# Patient Record
Sex: FEMALE | Race: WHITE | Employment: OTHER | URBAN - METROPOLITAN AREA
[De-identification: names, ages, dates, MRNs, and addresses within clinical notes are randomized per-mention and may not be internally consistent; named-entity substitution may affect disease eponyms.]

---

## 2021-05-27 PROBLEM — R09.81 NASAL CONGESTION: Status: ACTIVE | Noted: 2018-09-20

## 2021-05-27 PROBLEM — G31.84 MILD COGNITIVE IMPAIRMENT: Status: ACTIVE | Noted: 2021-03-29

## 2021-05-27 PROBLEM — J44.9 COPD WITH ASTHMA (HCC): Status: ACTIVE | Noted: 2021-05-06

## 2021-05-27 PROBLEM — H90.A22 SENSORINEURAL HEARING LOSS (SNHL) OF LEFT EAR WITH RESTRICTED HEARING OF RIGHT EAR: Status: ACTIVE | Noted: 2020-07-07

## 2021-05-27 PROBLEM — G89.29 CHRONIC LOW BACK PAIN: Status: ACTIVE | Noted: 2021-05-27

## 2021-05-27 PROBLEM — M54.50 CHRONIC LOW BACK PAIN: Status: ACTIVE | Noted: 2021-05-27

## 2021-05-27 PROBLEM — G47.33 OSA (OBSTRUCTIVE SLEEP APNEA): Status: ACTIVE | Noted: 2021-05-06

## 2021-05-27 PROBLEM — E78.5 DYSLIPIDEMIA: Status: ACTIVE | Noted: 2017-05-25

## 2021-05-27 PROBLEM — J30.9 ALLERGIC RHINITIS: Status: ACTIVE | Noted: 2021-05-27

## 2021-05-27 PROBLEM — F11.10 NARCOTIC ABUSE (HCC): Status: ACTIVE | Noted: 2021-05-27

## 2021-05-27 PROBLEM — J45.20 MILD INTERMITTENT ASTHMA WITHOUT COMPLICATION: Status: ACTIVE | Noted: 2021-05-27

## 2021-05-27 PROBLEM — K21.9 GASTROESOPHAGEAL REFLUX DISEASE WITHOUT ESOPHAGITIS: Status: ACTIVE | Noted: 2021-05-27

## 2021-05-27 PROBLEM — R42 DIZZINESS: Status: ACTIVE | Noted: 2020-07-07

## 2021-06-02 ENCOUNTER — HOSPITAL ENCOUNTER (OUTPATIENT)
Dept: ULTRASOUND IMAGING | Age: 76
Discharge: HOME | End: 2021-06-02
Attending: FAMILY MEDICINE
Payer: MEDICARE

## 2021-06-02 DIAGNOSIS — R22.1 NECK SWELLING: ICD-10-CM

## 2021-06-02 PROCEDURE — 76536 US EXAM OF HEAD AND NECK: CPT

## 2021-06-03 NOTE — PROGRESS NOTES
Please let patient know that ultrasound does show she needs a biopsy of a couple areas of her thyroid. Ask whether she wants to see ent or endocrinology. And if a different doctor is handling this. She has another pcp appointment with a Dr. Xavier Parent still.   She needs to choose one pcp to keep, whether here in [facility] or in [place].

## 2021-06-03 NOTE — PROGRESS NOTES
Spoke with patient regarding US results, expresses verbal understanding. She will stay with Dr Cecille Haskins, ENT for biopsies and she will choose you for her PCP.

## 2021-08-05 ENCOUNTER — HOSPITAL ENCOUNTER (OUTPATIENT)
Dept: GENERAL RADIOLOGY | Age: 76
Discharge: HOME | End: 2021-08-05
Attending: FAMILY MEDICINE
Payer: MEDICARE

## 2021-08-05 DIAGNOSIS — M54.50 CHRONIC MIDLINE LOW BACK PAIN, UNSPECIFIED WHETHER SCIATICA PRESENT: ICD-10-CM

## 2021-08-05 DIAGNOSIS — G89.29 CHRONIC MIDLINE LOW BACK PAIN, UNSPECIFIED WHETHER SCIATICA PRESENT: ICD-10-CM

## 2021-08-05 DIAGNOSIS — R20.2 NUMBNESS AND TINGLING OF LEFT LEG: ICD-10-CM

## 2021-08-05 DIAGNOSIS — R20.0 NUMBNESS AND TINGLING OF LEFT LEG: ICD-10-CM

## 2021-08-05 PROCEDURE — 72110 X-RAY EXAM L-2 SPINE 4/>VWS: CPT

## 2021-08-05 NOTE — PROGRESS NOTES
Please let patient know that xray doesn't have any acute findings, it has some arthritis. I will refer her to pain management.

## 2022-01-07 ENCOUNTER — HOSPITAL ENCOUNTER (EMERGENCY)
Age: 77
Discharge: HOME | End: 2022-01-07
Attending: EMERGENCY MEDICINE
Payer: MEDICARE

## 2022-01-07 ENCOUNTER — APPOINTMENT (OUTPATIENT)
Dept: GENERAL RADIOLOGY | Age: 77
End: 2022-01-07
Attending: EMERGENCY MEDICINE
Payer: MEDICARE

## 2022-01-07 VITALS
HEIGHT: 62 IN | DIASTOLIC BLOOD PRESSURE: 71 MMHG | HEART RATE: 98 BPM | BODY MASS INDEX: 25.76 KG/M2 | OXYGEN SATURATION: 96 % | SYSTOLIC BLOOD PRESSURE: 148 MMHG | WEIGHT: 140 LBS | RESPIRATION RATE: 16 BRPM | TEMPERATURE: 100.2 F

## 2022-01-07 DIAGNOSIS — I10 HYPERTENSION, UNSPECIFIED TYPE: ICD-10-CM

## 2022-01-07 DIAGNOSIS — U07.1 COVID-19: Primary | ICD-10-CM

## 2022-01-07 DIAGNOSIS — R11.2 NAUSEA AND VOMITING, INTRACTABILITY OF VOMITING NOT SPECIFIED, UNSPECIFIED VOMITING TYPE: ICD-10-CM

## 2022-01-07 LAB
ALBUMIN SERPL-MCNC: 3.6 G/DL (ref 3.2–4.6)
ALBUMIN/GLOB SERPL: 1.1 {RATIO} (ref 1.2–3.5)
ALP SERPL-CCNC: 106 U/L (ref 50–136)
ALT SERPL-CCNC: 30 U/L (ref 12–65)
ANION GAP SERPL CALC-SCNC: 2 MMOL/L (ref 7–16)
APPEARANCE UR: CLEAR
AST SERPL-CCNC: 20 U/L (ref 15–37)
BASOPHILS # BLD: 0 K/UL (ref 0–0.2)
BASOPHILS NFR BLD: 1 % (ref 0–2)
BILIRUB SERPL-MCNC: 0.6 MG/DL (ref 0.2–1.1)
BILIRUB UR QL: NEGATIVE
BUN SERPL-MCNC: 9 MG/DL (ref 8–23)
CALCIUM SERPL-MCNC: 8.8 MG/DL (ref 8.3–10.4)
CHLORIDE SERPL-SCNC: 102 MMOL/L (ref 98–107)
CO2 SERPL-SCNC: 30 MMOL/L (ref 21–32)
COLOR UR: YELLOW
COVID-19 RAPID TEST, COVR: DETECTED
CREAT SERPL-MCNC: 0.82 MG/DL (ref 0.6–1)
DIFFERENTIAL METHOD BLD: ABNORMAL
EOSINOPHIL # BLD: 0 K/UL (ref 0–0.8)
EOSINOPHIL NFR BLD: 0 % (ref 0.5–7.8)
ERYTHROCYTE [DISTWIDTH] IN BLOOD BY AUTOMATED COUNT: 12 % (ref 11.9–14.6)
GLOBULIN SER CALC-MCNC: 3.2 G/DL (ref 2.3–3.5)
GLUCOSE SERPL-MCNC: 98 MG/DL (ref 65–100)
GLUCOSE UR STRIP.AUTO-MCNC: NEGATIVE MG/DL
HCT VFR BLD AUTO: 39.4 % (ref 35.8–46.3)
HGB BLD-MCNC: 13.9 G/DL (ref 11.7–15.4)
HGB UR QL STRIP: NEGATIVE
IMM GRANULOCYTES # BLD AUTO: 0.1 K/UL (ref 0–0.5)
IMM GRANULOCYTES NFR BLD AUTO: 1 % (ref 0–5)
KETONES UR QL STRIP.AUTO: ABNORMAL MG/DL
LEUKOCYTE ESTERASE UR QL STRIP.AUTO: NEGATIVE
LYMPHOCYTES # BLD: 0.6 K/UL (ref 0.5–4.6)
LYMPHOCYTES NFR BLD: 10 % (ref 13–44)
MCH RBC QN AUTO: 32.8 PG (ref 26.1–32.9)
MCHC RBC AUTO-ENTMCNC: 35.3 G/DL (ref 31.4–35)
MCV RBC AUTO: 92.9 FL (ref 79.6–97.8)
MONOCYTES # BLD: 0.8 K/UL (ref 0.1–1.3)
MONOCYTES NFR BLD: 14 % (ref 4–12)
NEUTS SEG # BLD: 4.1 K/UL (ref 1.7–8.2)
NEUTS SEG NFR BLD: 74 % (ref 43–78)
NITRITE UR QL STRIP.AUTO: NEGATIVE
NRBC # BLD: 0 K/UL (ref 0–0.2)
PH UR STRIP: 7 [PH] (ref 5–9)
PLATELET # BLD AUTO: 145 K/UL (ref 150–450)
PMV BLD AUTO: 8 FL (ref 9.4–12.3)
POTASSIUM SERPL-SCNC: 3.8 MMOL/L (ref 3.5–5.1)
PROT SERPL-MCNC: 6.8 G/DL (ref 6.3–8.2)
PROT UR STRIP-MCNC: NEGATIVE MG/DL
RBC # BLD AUTO: 4.24 M/UL (ref 4.05–5.2)
SODIUM SERPL-SCNC: 134 MMOL/L (ref 136–145)
SOURCE, COVRS: ABNORMAL
SP GR UR REFRACTOMETRY: 1.01 (ref 1–1.02)
UROBILINOGEN UR QL STRIP.AUTO: 0.2 EU/DL (ref 0.2–1)
WBC # BLD AUTO: 5.6 K/UL (ref 4.3–11.1)

## 2022-01-07 PROCEDURE — 81003 URINALYSIS AUTO W/O SCOPE: CPT

## 2022-01-07 PROCEDURE — 71046 X-RAY EXAM CHEST 2 VIEWS: CPT

## 2022-01-07 PROCEDURE — 85025 COMPLETE CBC W/AUTO DIFF WBC: CPT

## 2022-01-07 PROCEDURE — 96374 THER/PROPH/DIAG INJ IV PUSH: CPT

## 2022-01-07 PROCEDURE — 74011250636 HC RX REV CODE- 250/636: Performed by: PHYSICIAN ASSISTANT

## 2022-01-07 PROCEDURE — 80053 COMPREHEN METABOLIC PANEL: CPT

## 2022-01-07 PROCEDURE — 87635 SARS-COV-2 COVID-19 AMP PRB: CPT

## 2022-01-07 PROCEDURE — 99284 EMERGENCY DEPT VISIT MOD MDM: CPT

## 2022-01-07 RX ORDER — METOPROLOL TARTRATE 25 MG/1
25 TABLET, FILM COATED ORAL 2 TIMES DAILY
Qty: 28 TABLET | Refills: 0 | Status: SHIPPED | OUTPATIENT
Start: 2022-01-07 | End: 2022-01-21

## 2022-01-07 RX ORDER — ACETAMINOPHEN 500 MG
1000 TABLET ORAL
Status: DISCONTINUED | OUTPATIENT
Start: 2022-01-07 | End: 2022-01-07 | Stop reason: HOSPADM

## 2022-01-07 RX ORDER — BENZONATATE 100 MG/1
100 CAPSULE ORAL
Qty: 21 CAPSULE | Refills: 0 | Status: SHIPPED | OUTPATIENT
Start: 2022-01-07 | End: 2022-01-14

## 2022-01-07 RX ORDER — ONDANSETRON 2 MG/ML
4 INJECTION INTRAMUSCULAR; INTRAVENOUS ONCE
Status: COMPLETED | OUTPATIENT
Start: 2022-01-07 | End: 2022-01-07

## 2022-01-07 RX ORDER — ONDANSETRON 4 MG/1
4 TABLET, ORALLY DISINTEGRATING ORAL
Qty: 12 TABLET | Refills: 0 | Status: SHIPPED | OUTPATIENT
Start: 2022-01-07 | End: 2022-01-10

## 2022-01-07 RX ADMIN — ONDANSETRON 4 MG: 2 INJECTION INTRAMUSCULAR; INTRAVENOUS at 12:29

## 2022-01-07 RX ADMIN — SODIUM CHLORIDE 1000 ML: 9 INJECTION, SOLUTION INTRAVENOUS at 12:28

## 2022-01-07 NOTE — ED NOTES
I have reviewed discharge instructions with the patient. The patient verbalized understanding. Patient left ED via Discharge Method: ambulatory to Home with self. Opportunity for questions and clarification provided. Patient given 1 scripts. To continue your aftercare when you leave the hospital, you may receive an automated call from our care team to check in on how you are doing. This is a free service and part of our promise to provide the best care and service to meet your aftercare needs.  If you have questions, or wish to unsubscribe from this service please call [phone]. Thank you for Choosing our [facility] Emergency Department.

## 2022-01-07 NOTE — ED TRIAGE NOTES
Patient with multiple complaints in triage states her blood pressure has been up and down for \" a while\", dizzy episiodes for \"a long time\", fever, headache, sinus congestion, vomiting. Patient advises that she talked to her physician yesterday and was told to come get examined for COVID. Patient also concerned that she has not been able to get a CPAP machine. Masked.

## 2022-01-07 NOTE — DISCHARGE INSTRUCTIONS
I would recommend quarantining for additional 5 days since you have a low-grade fever here. If you do not have a fever or congestion by that point then you do not have to quarantine anymore but can wear a mask for another 5 days.

## 2022-01-07 NOTE — ED PROVIDER NOTES
Presents to the emergency department stating that over the last 3 days she has had headache, nasal congestion and cough. Today she had 2 episodes of vomiting. Subjective fever and chills and myalgias. She has a history of asthma and COPD. She denies any chest pain or any worsening shortness of breath. She does have an albuterol inhaler at home.     Also complains of some urinary frequency over the past week but denies any dysuria           Past Medical History:   Diagnosis Date    Arthritis     Asthma     MDI as needed    Cancer ([address]     skin    Claustrophobia     Environmental allergies     GERD (gastroesophageal reflux disease)     rare    Goiter     Hypertension     Memory loss     Multinodular goiter 2016    Psychiatric disorder     bi-polar       Past Surgical History:   Procedure Laterality Date    HX BREAST BIOPSY  2013    BREAST NEEDLE LOCALIZED BIOPSY performed by Juan Michel MD at [address] HX BREAST LUMPECTOMY  2013    BREAST LUMPECTOMY/PARTIAL performed by Juan Michel MD at [address] HX CATARACT REMOVAL      right eye    HX CHOLECYSTECTOMY      HX GYN      ovarian cysts, L ovary    HX HEENT      tonsillectomy    HX HEENT      sinus    HX OTHER SURGICAL      skin cancer removed from face         Family History:   Problem Relation Age of Onset    Cancer Mother         Uterine       Social History     Socioeconomic History    Marital status: SINGLE     Spouse name: Not on file    Number of children: Not on file    Years of education: Not on file    Highest education level: Not on file   Occupational History    Not on file   Tobacco Use    Smoking status: Former Smoker     Quit date: 2011     Years since quittin.0    Smokeless tobacco: Never Used   Vaping Use    Vaping Use: Never used   Substance and Sexual Activity    Alcohol use: No    Drug use: No    Sexual activity: Not Currently   Other Topics Concern     Service Not Asked    Blood Transfusions Not Asked    Caffeine Concern Not Asked    Occupational Exposure Not Asked    Hobby Hazards Not Asked    Sleep Concern Not Asked    Stress Concern Not Asked    Weight Concern Not Asked    Special Diet Not Asked    Back Care Not Asked    Exercise Not Asked    Bike Helmet Not Asked    Seat Belt Yes    Self-Exams Yes   Social History Narrative    Not on file     Social Determinants of Health     Financial Resource Strain:     Difficulty of Paying Living Expenses: Not on file   Food Insecurity:     Worried About Running Out of Food in the Last Year: Not on file    Param of Food in the Last Year: Not on file   Transportation Needs:     Lack of Transportation (Medical): Not on file    Lack of Transportation (Non-Medical):  Not on file   Physical Activity:     Days of Exercise per Week: Not on file    Minutes of Exercise per Session: Not on file   Stress:     Feeling of Stress : Not on file   Social Connections:     Frequency of Communication with Friends and Family: Not on file    Frequency of Social Gatherings with Friends and Family: Not on file    Attends Hinduism Services: Not on file    Active Member of [address] [place] [address] or Organizations: Not on file    Attends Club or Organization Meetings: Not on file    Marital Status: Not on file   Intimate Partner Violence:     Fear of Current or Ex-Partner: Not on file    Emotionally Abused: Not on file    Physically Abused: Not on file    Sexually Abused: Not on file   Housing Stability:     Unable to Pay for Housing in the Last Year: Not on file    Number of [place] in the Last Year: Not on file    Unstable Housing in the Last Year: Not on file         ALLERGIES: Latex, Articaine, Clindamycin, Donepezil, Epinephrine, Erythromycin, Keflex [cephalexin], Penicillin g, Penicillins, Rivastigmine, Tramadol, Chlorzoxazone, Gabapentin, and Tetracyclines    Review of Systems   Constitutional: Positive for chills, fatigue and fever. HENT: Positive for congestion. Respiratory: Positive for cough. Negative for shortness of breath. Gastrointestinal: Positive for nausea and vomiting. Neurological: Positive for headaches. All other systems reviewed and are negative. Vitals:    01/07/22 1042   BP: 128/76   Pulse: 98   Resp: 16   Temp: 100.2 °F (37.9 °C)   SpO2: 96%   Weight: 63.5 kg (140 lb)   Height: 5' 2\" (1.575 m)            Physical Exam  Vitals and nursing note reviewed. Constitutional:       Appearance: Normal appearance. HENT:      Head: Normocephalic and atraumatic. Nose: Congestion present. Mouth/Throat:      Mouth: Mucous membranes are moist.   Eyes:      Extraocular Movements: Extraocular movements intact. Conjunctiva/sclera: Conjunctivae normal.      Pupils: Pupils are equal, round, and reactive to light. Cardiovascular:      Rate and Rhythm: Normal rate and regular rhythm. Pulses: Normal pulses. Heart sounds: Normal heart sounds. Pulmonary:      Effort: Pulmonary effort is normal. No respiratory distress. Breath sounds: Normal breath sounds. No wheezing. Abdominal:      General: Abdomen is flat. Palpations: Abdomen is soft. Tenderness: There is no abdominal tenderness. There is no guarding. Musculoskeletal:      Cervical back: Normal range of motion and neck supple. Neurological:      Mental Status: She is alert. MDM  Number of Diagnoses or Management Options  Diagnosis management comments: Differential diagnoses: COVID-19 infection, pneumonia, urinary tract infection    Patient's urine does not show any sign of UTI. We do not have monoclonal antibodies at this time to offer to the patient.   She also states that she is running low on her metoprolol for hypertension and request a refill which I will give her a prescription for       Amount and/or Complexity of Data Reviewed  Clinical lab tests: reviewed  Tests in the radiology section of CPT®: reviewed    Risk of Complications, Morbidity, and/or Mortality  Presenting problems: moderate  Diagnostic procedures: moderate  Management options: moderate    Patient Progress  Patient progress: improved         Procedures

## 2022-01-07 NOTE — ED NOTES
63-year-old female presents to the ED, stating that she is here on the advisement of her PCP and pulmonologist.  States that she had telephone calls \"practices yesterday afternoon with complaints of fever, headache, congestion. Patient states that they advised her to present to the ED, but she called a peer yesterday and after hearing what the wait time was, decided not to come to the department until today. Patient reports nausea with vomiting beginning overnight. Endorses productive cough with clear-colored expectorate, headache, fever, dizziness, nasal congestion and rhinorrhea and reports her concern for sinus infection x5-7 days. No numbness or tingling, cranial nerves grossly intact. Conversant length with no increased effort. Patient evaluated initially in triage. Rapid Medical Evaluation was conducted and necessary orders have been placed. I have performed a medical screening exam.  Care will now be transferred to the provider in the back of the emergency department.   Krystal Bennett NP 10:46 AM

## 2022-03-02 PROBLEM — S00.03XA CONTUSION OF SCALP: Status: ACTIVE | Noted: 2022-03-02

## 2022-03-09 ENCOUNTER — HOSPITAL ENCOUNTER (OUTPATIENT)
Dept: PHYSICAL THERAPY | Age: 77
Discharge: HOME | End: 2022-03-09
Attending: PHYSICIAN ASSISTANT
Payer: MEDICARE

## 2022-03-09 DIAGNOSIS — H81.12 BENIGN PAROXYSMAL POSITIONAL VERTIGO OF LEFT EAR: ICD-10-CM

## 2022-03-09 PROCEDURE — 97162 PT EVAL MOD COMPLEX 30 MIN: CPT

## 2022-03-09 NOTE — PROGRESS NOTES
Orvel Cogan  : 1945  [place]  Secondary:  [facility] [address] [facility], [place], [place] [address]  [phone]   [phone]         OUTPATIENT PHYSICAL THERAPY: Daily Treatment Note 3/9/2022  Visit Count:  1    ICD-10: Treatment Diagnosis: other abnormalities of gait and mobility R26.89; Dizziness and giddiness R42  Precautions/Allergies:   Latex, Articaine, Clindamycin, Donepezil, Epinephrine, Erythromycin, Keflex [cephalexin], Penicillin g, Penicillins, Rivastigmine, Tramadol, Chlorzoxazone, Gabapentin, and Tetracyclines   TREATMENT PLAN:  Effective Dates: 3/9/2022 TO 2022 (90 days). Frequency/Duration: 1-2 times a week for 60- 90 Day(s)    Pre-treatment Symptoms/Complaints:  Falls, dizziness  Pain: Initial: Pain Intensity 1: 0 0/10 Post Session:  0/10   Medications Last Reviewed:  3/9/2022  Updated Objective Findings:  See evaluation note from today  TREATMENT:     NEUROMUSCULAR RE-EDUCATION: (0 minutes):  Exercise/activities per grid below to improve balance, coordination, kinesthetic sense, posture and proprioception. Required minimal verbal cues to promote static and dynamic balance in standing.   Activity   Date   Date Date Date Date Date   Activity/Exercise   Sets/reps/equipment Sets/reps/  equipment Sets/reps/  equipment Sets/reps/  equipment Sets/reps/  equipment Sets/reps/  equipment   Walking with head turns             Walking with head up & down             Step ups             Step taps             Marching           Sidestepping           Crossovers           [place]           Walking  backwards             Tandem walking           Weaving in/out of cones             Picking up cones             Sports cord             [employer] ball           Figure 8s            Circles right/left           Walking with 360 degree turns           Spirals           Weight shifting:    Left & Right Weight shifting: Forward & Backward              Static Standing Balance             Standing with feet apart             Standing with feet together             Standing with feet semitandem           Standing with feet tandem           Single leg stance           X1/X2 Viewing exercises             [place] testing for BPPV (Benign Paroxysmal Positional Vertigo)             Young-Daroff exercises           Canalith Repositioning treatment/Epley Maneuver  for BPPV (Benign Paroxysmal Positional Vertigo)           Smart [employer] Training: See scanned report. [employer] Meeting Portal  Treatment/Session Summary:    · Response to Treatment:  Patient tolerated session well. Will give HEP next session. · Communication/Consultation:  None today  · Equipment provided today:  None today  · Recommendations/Intent for next treatment session: Next visit will focus on balance training, safety with mobility. Total Treatment Billable Duration: Evaluation only today.    PT Patient Time In/Time Out  Time In: 1105  Time Out: [address], PT   Visit # Therapist initials Date Arrived NS/ Cx < 24 hr >24 hr Cx Visit Comments   1 MM 3/9/22 X   Initial Assessment Only Today  Medicare                                                                                                                                                              Abbreviations: NS = No Show; CX = cancelled        Future Appointments   Date Time Provider Celeste Landry   3/11/2022  3:00 PM PVF NURSE ONLY ASHLEY PVF PVD   3/16/2022 10:15 AM Almeta Boron, PT [facility] S[facility]   3/16/2022 11:30 AM MD ASHLEY Osuna CMW CMW   3/23/2022 11:00 AM Almeta Boron, PT [facility]   3/31/2022  1:00 PM Almgilberto Boron, PT [facility] SFE   8/4/2022  3:00 PM MD ASHLEY Johansen PVF PVD

## 2022-03-09 NOTE — THERAPY EVALUATION
Rayne Fields  : 1945  [place]  Secondary:  [address] at [address]  [address], [address], [facility] [address]  [phone]   [phone]           [address] Assessment 3/9/2022   ICD-10: Treatment Diagnosis: other abnormalities of gait and mobility R26.89; Dizziness and giddiness R42  Precautions/Allergies:   Latex, Articaine, Clindamycin, Donepezil, Epinephrine, Erythromycin, Keflex [cephalexin], Penicillin g, Penicillins, Rivastigmine, Tramadol, Chlorzoxazone, Gabapentin, and Tetracyclines   TREATMENT PLAN:  Effective Dates: 3/9/2022 TO 2022 (90 days). Frequency/Duration: 1-2 times a week for 60- 90 Day(s) MEDICAL/REFERRING DIAGNOSIS:  Benign paroxysmal positional vertigo of left ear [H81.12]   DATE OF ONSET: 22  REFERRING PHYSICIAN: Dash Brunner*  MD Orders: referral to PT  Return MD Appointment:      INITIAL ASSESSMENT:  Ms. Leeanna Eid presents with history of recent fall, hitting the back of her head, and dizziness. Patient tested negatively for BPPV bilaterally today. Patient had no c/o of increased dizziness with balance testing and mobility today, but reports a constant dizziness. Patient is at risk for falls with a score of 40/56 on the Blanton Balance Scale and a score of 14/24 on the Dynamic Gait Index. Patient would benefit from balance training to improve safety and decrease fall risk. Patient would benefit from PT to address these problems to improve patient's independence and safety with mobility and daily activities. Thank you. PROBLEM LIST (Impacting functional limitations):  1. Decreased Strength  2. Decreased ADL/Functional Activities  3. Decreased Transfer Abilities  4. Decreased Ambulation Ability/Technique  5. Decreased Balance  6. Decreased Activity Tolerance  7.  Decreased [place] with Home Exercise Program INTERVENTIONS PLANNED: (Treatment may consist of any combination of the following)  1. Balance Exercise  2. Home Exercise Program (HEP)  3. Neuromuscular Re-education/Strengthening  4. Therapeutic Activites  5. Therapeutic Exercise/Strengthening     GOALS: (Goals have been discussed and agreed upon with patient.)  Short-Term Functional Goals: Time Frame: 2-4 weeks  1. Patient will demonstrate independence and compliance with home exercise program to improve balance and dizziness for daily activities. 2. Patient will increase her score on the Blanton Balance Scale to greater than or equal to 43/56 indicating improved safety and decreased fall risk for daily activities. 3.   Discharge Goals: Time Frame: 8-12 weeks  1. Patient will report decreased dizziness to less than or equal to 1-2/10 with daily activities to improve safety and quality of life. 2. Patient will increase her score on the dynamic gait index to greater than or equal to 18/24 indicating improved balance and safety for community ambulation. 3. Patient will increase her score on the Blanton Balance Scale to greater than or equal to 48/56 indicating improved safety and decreased fall risk for daily activities. 4. Patient will ambulate with least assistive device over level and unlevel surfaces without evidence of imbalance to improve safety for daily activities. OUTCOME MEASURE:   Tool Used: Blanton Balance Scale  Score:  Initial: 40/56 Most Recent: X/56 (Date: -- )   Interpretation of Score: Each section is scored on a 0-4 scale, 0 representing the patients inability to perform the task and 4 representing independence. The scores of each section are added together for a total score of 56. The higher the patients score, the more independent the patient is. Any score below 45 indicates increased risk for falls.       Tool Used: Dynamic Gait Index  Score:  Initial: 14/24 Most Recent: X/24 (Date: -- )   Interpretation of Score: Each section is scored on a 0-3 scale, 0 representing the patients inability to perform the task and 3 representing independence. The scores of each section are added together for a total score of 24. Any score below 19 indicates increased risk for falls. MEDICAL NECESSITY:   · Patient is expected to demonstrate progress in strength, balance and functional technique to improve safety during daily activities. REASON FOR SERVICES/OTHER COMMENTS:  · Patient continues to demonstrate capacity to improve dizziness, balance, gait which will increase independence and increase safety. Total Duration:  PT Patient Time In/Time Out  Time In: 1105  Time Out: 1145    Rehabilitation Potential For Stated Goals: Good  Regarding Miriam Singh's therapy, I certify that the treatment plan above will be carried out by a therapist or under their direction. Thank you for this referral,  Natalie Judge, PT     Referring Physician Signature: James Caba _______________________________ Date _____________      PAIN/SUBJECTIVE:   Initial: Pain Intensity 1: 0  Post Session:  0/10   HISTORY:   History of Injury/Illness (Reason for Referral):  Brother brought her to PT, and spoke to PT privately to discuss patient's primary reason for being here, dizziness and fall. No falls since, but near falls. Brother says patient is mentally ill and is not sure what patient would say about what happened. Patient has no c/o dizziness lately. Patient is a poor historian. Pt says R leg gives her problems with balance. Recently on CPAP.  2/26/22 fell and hit back of head. Dizzy since day before. Was prescribed antivert at [facility] in [facility]. Past Medical History/Comorbidities:   Ms. Eloina Chambers  has a past medical history of Multinodular goiter (5/9/2016).     She has no past medical history of Aneurysm ([address] Arrhythmia, Autoimmune disease ([address] CAD (coronary artery disease), Chronic kidney disease, Chronic pain, Coagulation defects, COPD, Diabetes ([address] Difficult intubation, Heart failure ([address] Liver disease, Malignant hyperthermia due to anesthesia, Morbid obesity ([facility] Nausea & vomiting, Other ill-defined conditions(799[address]89), Pseudocholinesterase deficiency, PUD (peptic ulcer disease), Seizures ([facility] [address] Stroke ([facility] [address] Thromboembolus ([facility] [address] Unspecified adverse effect of anesthesia, or Unspecified sleep apnea. Ms. Theresa Lee  has a past surgical history that includes hx cholecystectomy; hx gyn; hx heent; hx cataract removal; hx heent; hx other surgical; hx breast lumpectomy (6/14/2013); and hx breast biopsy (6/14/2013). Social History/Living Environment:     lives alone, first floor. Drives but doesn't have a car so brother takes her places. No AD  Prior Level of Function/Work/Activity:  independent  Dominant Side:         LEFT  Personal Factors:          Sex:  female        Age:  78 y.o. Ambulatory/Rehab Services H2 Model Falls Risk Assessment   Risk Factors:       (4)  Confusion/Disorientation/Impulsivity       (1)  Dizziness/Vertigo       (5)  History of Recent Falls [w/in 3 months] Ability to Rise from Chair:       (0)  Ability to rise in a single movement   Falls Prevention Plan:       Exercise/Equipment Adaptation (specify):  close supervision during mobility/balance activities   Total: (5 or greater = High Risk): 10   ©2010 [facility] [address] Patent #[id number]. Federal Law prohibits the replication, distribution or use without written permission from [facility] [employer]   Current Medications:       Current Outpatient Medications:     meclizine (ANTIVERT) 12.5 mg tablet, Take 1 Tablet by mouth three (3) times daily as needed for Dizziness for up to 10 days. , Disp: 30 Tablet, Rfl: 0    aspirin 81 mg chewable tablet, Take 81 mg by mouth daily. , Disp: , Rfl:     fluticasone propionate (FLONASE) 50 mcg/actuation nasal spray, SPRAY 2 SPRAYS INTO EACH NOSTRIL EVERY DAY, Disp: 3 Each, Rfl: 3    OLANZapine (ZyPREXA) 5 mg tablet, Take  by mouth nightly.  Take 5 mg, one tab in the morning and 10 mg, 2 tabs at night., Disp: , Rfl:     diphenhydrAMINE (BenadryL) 25 mg capsule, Take 25 mg by mouth every six (6) hours as needed. Take one to two capsules as needed for allergies. , Disp: , Rfl:     albuterol (Proventil HFA) 90 mcg/actuation inhaler, Take 1 Puff by inhalation every four (4) hours as needed for Wheezing. Take morning of surgery if needed and bring to hospital, Disp: 1 Each, Rfl: 5    lidocaine-menthol 4-1 % lqro, by Apply Externally route. Use as needed for pain, Disp: , Rfl:     ketoconazole (NIZORAL) 2 % shampoo, Use PRN, Disp: , Rfl:     meloxicam (MOBIC) 7.5 mg tablet, Take 1 Tablet by mouth two (2) times a day., Disp: 180 Tablet, Rfl: 3    budesonide-formoteroL (Symbicort) 160-4.5 mcg/actuation HFAA, Take 1 Puff by inhalation two (2) times a day., Disp: 3 Inhaler, Rfl: 3    magnesium gluconate 500 mg (27 mg  elemental) tablet, Take  by mouth two (2) times a day., Disp: , Rfl:     COQ10, LIPOSOMAL UBIQUINOL, PO, Take  by mouth., Disp: , Rfl:     ZINC ACETATE PO, Take  by mouth., Disp: , Rfl:     polyethylene glycol 3350 (MIRALAX PO), Take  by mouth., Disp: , Rfl:     vit C/ascorbate calcium,sodium (VIT C-ASCORBATE CA-ASCORB SOD PO), Take  by mouth., Disp: , Rfl:     metoprolol tartrate (LOPRESSOR) 25 mg tablet, Take 1 Tablet by mouth two (2) times a day., Disp: 180 Tablet, Rfl: 3    Adenosine Triphosphate 25 mg TbEC, Take 1 Tab by mouth., Disp: , Rfl:     vitamin e (E GEMS) 1,000 unit capsule, Take 1,000 Units by mouth daily. , Disp: , Rfl:     cholecalciferol (VITAMIN D3) 1,000 unit tablet, Take  by mouth daily. , Disp: , Rfl:     fexofenadine (ALLEGRA) 180 mg tablet, Take  by mouth daily. , Disp: , Rfl:     cyanocobalamin (VITAMIN B12) 500 mcg tablet, Take 500 mcg by mouth daily. , Disp: , Rfl:    Date Last Reviewed:  3/9/22   Number of Personal Factors/Comorbidities that affect the Plan of Care: 3+: HIGH COMPLEXITY   EXAMINATION: Observation/Orthostatic Postural Assessment:          Fwd head posture  Strength:         LE STRENGTH:  4/5 L 4-/5R hip flexion, 4/5 hip abduction, 4/5 hip adduction, 4/5 hip extension, 4/5L 4-/5R knee extension, 4/5 knee flexion, 4/5 ankle dorsiflexion, 4/5 ankle plantarflexion      Functional Mobility:         Gait/Ambulation:  Patient ambulates with no AD, demonstrating decreased step length and heel strike B, slow pace, mild path deviations. Transfers:  independent        Bed Mobility:  independent        Stairs:  With rail  Sensation:         intact  Postural Control & Balance:  · Blanton Balance Scale:  40/56.   (A score less than 45/56 indicates high risk of falls)     · Dynamic Gait Index:  14/24.   (A score less than or equal to19/24 is abnormal and predictive of falls)         Oculomotor Exam:  · Eye Range of Motion:  full range of motion  · Cervical Range of Motion:   diminished range of motion  · Spontaneous nystagmus:  NO  · Gaze holding nystagmus:  NO  · Skew Deviation:  none  · Smooth Pursuit:      [x]Smooth Eye Movements    []Delayed Eye Movements     []Within Normal Limits     []Other(comment): · Voluntary Saccades:      [x]Smooth Eye Movements    []Delayed Eye Movements     []Within Normal Limits     []Other(comment): · Optokinetic cloth: NT  Vestibular Ocular Reflex Testing:  · Dynamic Visual Acuity Test: NT  · Head Thrust Test: Negative to the right. Negative to the left. · VOR Cancellation: WNL  Position Tests:  · Hallpike-Sedalia testing presented as negative indicating that Benign Paroxysmal Positional Vertigo (BPPV) is absent bilaterally. Body Structures Involved:  1. Nerves  2. Eyes and Ears  3. Muscles Body Functions Affected:  1. Sensory/Pain  2. Neuromusculoskeletal  3. Movement Related Activities and Participation Affected:  1. General Tasks and Demands  2. Mobility  3. Domestic Life  4.  Community, Social and [place]   Number of elements (examined above) that affect the Plan of Care: 3: MODERATE COMPLEXITY   CLINICAL PRESENTATION:   Presentation: Evolving clinical presentation with changing clinical characteristics: MODERATE COMPLEXITY   CLINICAL DECISION MAKING:   Use of outcome tool(s) and clinical judgement create a POC that gives a: Questionable prediction of patient's progress: MODERATE COMPLEXITY

## 2022-03-16 ENCOUNTER — HOSPITAL ENCOUNTER (OUTPATIENT)
Dept: PHYSICAL THERAPY | Age: 77
Discharge: HOME | End: 2022-03-16
Attending: PHYSICIAN ASSISTANT
Payer: MEDICARE

## 2022-03-16 PROCEDURE — 97110 THERAPEUTIC EXERCISES: CPT

## 2022-03-16 PROCEDURE — 97112 NEUROMUSCULAR REEDUCATION: CPT

## 2022-03-16 NOTE — PROGRESS NOTES
Tomy Potts  : 1945  [place]  Secondary:  [address] at [place] [id number] [facility], [address] [place] [address],[address], [facility] [address]  [phone]   [phone]         OUTPATIENT PHYSICAL THERAPY: Daily Treatment Note 3/16/2022  Visit Count:  2    ICD-10: Treatment Diagnosis: other abnormalities of gait and mobility R26.89; Dizziness and giddiness R42  Precautions/Allergies:   Latex, Articaine, Clindamycin, Donepezil, Epinephrine, Erythromycin, Keflex [cephalexin], Penicillin g, Penicillins, Rivastigmine, Tramadol, Chlorzoxazone, Gabapentin, and Tetracyclines   TREATMENT PLAN:  Effective Dates: 3/9/2022 TO 2022 (90 days). Frequency/Duration: 1-2 times a week for 60- 90 Day(s)    Pre-treatment Symptoms/Complaints:  Falls, dizziness; \"doing okay. No falls. \"  Pain: Initial:   0/10 Post Session:  0/10   Medications Last Reviewed:  3/16/2022  Updated Objective Findings:  None Today  TREATMENT:     NEUROMUSCULAR RE-EDUCATION: (35 minutes):  Exercise/activities per grid below to improve balance, coordination, kinesthetic sense, posture and proprioception. Required minimal verbal cues to promote static and dynamic balance in standing.   Activity   Date  3/16/22 Date Date Date Date Date   Activity/Exercise   Sets/reps/equipment Sets/reps/  equipment Sets/reps/  equipment Sets/reps/  equipment Sets/reps/  equipment Sets/reps/  equipment   Walking with head turns             Walking with head up & down             Step overs     Over 1/2 foam roll x 10 reps each leg fwd and laterally        Step taps     6 inch step x 20 reps fwd and cross        Marching   4 laps in real        Sidestepping   4 laps in real        Crossovers           [facility]  backwards             Tandem walking           Weaving in/out of cones             Picking up cones             Sports cord             [employer] ball           Figure 8s            Circles right/left           Walking with 360 degree turns           Spirals           Weight shifting:    Left & Right             Weight shifting: Forward & Backward              Static Standing Balance             Standing with feet apart     Blue foams eyes open and closed        Standing with feet together             Standing with feet semitandem   Blue foams eyes open and closed        Standing with feet tandem           Single leg stance           X1/X2 Viewing exercises             Hallpike-Amna testing for BPPV (Benign Paroxysmal Positional Vertigo)             Young-Daroff exercises           Canalith Repositioning treatment/Epley Maneuver  for BPPV (Benign Paroxysmal Positional Vertigo)           Smart Equitest Training: See scanned report. Therapeutic Exercise: ( 10 minutes):  Exercises per grid below to improve mobility, strength and balance. Required minimal verbal cues to promote proper body alignment, promote proper body posture and promote proper body mechanics. Progressed resistance, repetitions and complexity of movement as indicated. Date:  3/16/22 Date:   Date:     Activity/Exercise Parameters Parameters Parameters   Step ups 6 inch step x 10 reps each leg with rail     Sit to stand X 10 reps from chair with no UE assist                                         [employer] Portal  Treatment/Session Summary:    · Response to Treatment:  Patient tolerated session well. Patient needed seated rest breaks during session due to fatigue. Patient is cautious with balance exercises but does well with encouragement. Continue to progress as tolerated. · Communication/Consultation:  None today  · Equipment provided today:  None today  · Recommendations/Intent for next treatment session: Next visit will focus on balance training, safety with mobility.     Total Treatment Billable Duration: 45 minutes  PT Patient Time In/Time Out  Time In: 1015  Time Out: [address] ROCAEL Phillips   Visit # Therapist initials Date Arrived NS/ Cx < 24 hr >24 hr Cx Visit Comments   1 MM 3/9/22 X   Initial Assessment Only Today  Medicare   2 MM 3/16/22 X                                                                                                                                                        Abbreviations: NS = No Show; CX = cancelled        Future Appointments   Date Time Provider Celeste Gris   3/23/2022 11:00 AM Giuliana Marr PT [facility]   3/31/2022  1:00 PM ROCAEL Lindquist E   8/4/2022  3:00 PM Orquidea Rogers MD [facility] PVD

## 2022-03-18 PROBLEM — E78.5 DYSLIPIDEMIA: Status: ACTIVE | Noted: 2017-05-25

## 2022-03-18 PROBLEM — R09.81 NASAL CONGESTION: Status: ACTIVE | Noted: 2018-09-20

## 2022-03-18 PROBLEM — K21.9 GASTROESOPHAGEAL REFLUX DISEASE WITHOUT ESOPHAGITIS: Status: ACTIVE | Noted: 2021-05-27

## 2022-03-18 PROBLEM — H90.A22 SENSORINEURAL HEARING LOSS (SNHL) OF LEFT EAR WITH RESTRICTED HEARING OF RIGHT EAR: Status: ACTIVE | Noted: 2020-07-07

## 2022-03-18 PROBLEM — G31.84 MILD COGNITIVE IMPAIRMENT: Status: ACTIVE | Noted: 2021-03-29

## 2022-03-19 PROBLEM — F11.10 NARCOTIC ABUSE (HCC): Status: ACTIVE | Noted: 2021-05-27

## 2022-03-19 PROBLEM — J44.89 COPD WITH ASTHMA: Status: ACTIVE | Noted: 2021-05-06

## 2022-03-19 PROBLEM — J30.9 ALLERGIC RHINITIS: Status: ACTIVE | Noted: 2021-05-27

## 2022-03-19 PROBLEM — R42 DIZZINESS: Status: ACTIVE | Noted: 2020-07-07

## 2022-03-19 PROBLEM — J44.9 COPD WITH ASTHMA (HCC): Status: ACTIVE | Noted: 2021-05-06

## 2022-03-19 PROBLEM — J45.20 MILD INTERMITTENT ASTHMA WITHOUT COMPLICATION: Status: ACTIVE | Noted: 2021-05-27

## 2022-03-20 PROBLEM — G89.29 CHRONIC LOW BACK PAIN: Status: ACTIVE | Noted: 2021-05-27

## 2022-03-20 PROBLEM — G47.33 OSA (OBSTRUCTIVE SLEEP APNEA): Status: ACTIVE | Noted: 2021-05-06

## 2022-03-20 PROBLEM — M54.50 CHRONIC LOW BACK PAIN: Status: ACTIVE | Noted: 2021-05-27

## 2022-03-23 ENCOUNTER — HOSPITAL ENCOUNTER (OUTPATIENT)
Dept: PHYSICAL THERAPY | Age: 77
Discharge: HOME | End: 2022-03-23
Attending: PHYSICIAN ASSISTANT
Payer: MEDICARE

## 2022-03-23 PROCEDURE — 97110 THERAPEUTIC EXERCISES: CPT

## 2022-03-23 PROCEDURE — 97112 NEUROMUSCULAR REEDUCATION: CPT

## 2022-03-23 NOTE — PROGRESS NOTES
Rojas Hallman  : 1945  [place]  Secondary:  [address]  at [facility]  [address], [address] [facility] [address], [place] [address]  [phone]   [phone]         OUTPATIENT PHYSICAL THERAPY: Daily Treatment Note 3/23/2022  Visit Count:  3    ICD-10: Treatment Diagnosis: other abnormalities of gait and mobility R26.89; Dizziness and giddiness R42  Precautions/Allergies:   Latex, Articaine, Clindamycin, Donepezil, Epinephrine, Erythromycin, Keflex [cephalexin], Penicillin g, Penicillins, Rivastigmine, Tramadol, Chlorzoxazone, Gabapentin, and Tetracyclines   TREATMENT PLAN:  Effective Dates: 3/9/2022 TO 2022 (90 days). Frequency/Duration: 1-2 times a week for 60- 90 Day(s)    Pre-treatment Symptoms/Complaints:  Falls, dizziness; \"I didn't sleep well last night. The cpap kept me awake all night trying to fix it. I'm dizzy again. R foot lags. \"  Pain: Initial:   0/10 Post Session:  0/10   Medications Last Reviewed:  3/23/2022  Updated Objective Findings:  None Today  TREATMENT:     NEUROMUSCULAR RE-EDUCATION: (30 minutes):  Exercise/activities per grid below to improve balance, coordination, kinesthetic sense, posture and proprioception. Required minimal verbal cues to promote static and dynamic balance in standing.   Activity   Date  3/16/22 Date  3/23/22 Date Date Date Date   Activity/Exercise   Sets/reps/equipment Sets/reps/  equipment Sets/reps/  equipment Sets/reps/  equipment Sets/reps/  equipment Sets/reps/  equipment   Walking with head turns             Walking with head up & down             Step overs     Over 1/2 foam roll x 10 reps each leg fwd and laterally Over 1/2 foam roll x 10 reps each leg fwd and laterally       Step taps     6 inch step x 20 reps fwd and cross 6 inch step x 20 reps fwd and cross       Marching   4 laps in real Not today; 4 laps in real       Sidestepping   4 laps in real Not today: 4 laps in real       Crossovers [place]           Walking  backwards             Tandem walking           Weaving in/out of cones             Picking up cones             Sports cord             [employer]           Kick ball           Figure 8s            Circles right/left           Walking with 360 degree turns           Spirals           Weight shifting:    Left & Right             Weight shifting: Forward & Backward              Static Standing Balance             Standing with feet apart     Blue foams eyes open and closed Blue foams eyes open and closed       Standing with feet together             Standing with feet semitandem   Blue foams eyes open and closed Blue foams eyes open and closed       Standing with feet tandem           Single leg stance           X1/X2 Viewing exercises             Hallpike-Amna testing for BPPV (Benign Paroxysmal Positional Vertigo)             Young-Daroff exercises           Canalith Repositioning treatment/Epley Maneuver  for BPPV (Benign Paroxysmal Positional Vertigo)           Smart Equitest Training: See scanned report. Therapeutic Exercise: ( 15 minutes):  Exercises per grid below to improve mobility, strength and balance. Required minimal verbal cues to promote proper body alignment, promote proper body posture and promote proper body mechanics. Progressed resistance, repetitions and complexity of movement as indicated. Date:  3/16/22 Date:  3/23/22 Date:     Activity/Exercise Parameters Parameters Parameters   Step ups 6 inch step x 10 reps each leg with rail 6 inch step x 10 reps each leg with rail    Sit to stand X 10 reps from chair with no UE assist X 10 reps from chair with no UE assist    nustep  Level 1 x 6 minutes                                  [employer] Portal  Treatment/Session Summary:    · Response to Treatment:  Patient tolerated session well. She needs frequent rest breaks due to fatigue, but recovers quickly.  Patient is hesitant to let go of bar during balance exercises, but will for short periods. Patient had no complaints during session. · Equipment provided today:  None today  · Recommendations/Intent for next treatment session: Next visit will focus on balance training, safety with mobility.     Total Treatment Billable Duration: 45 minutes  PT Patient Time In/Time Out  Time In: 1100  Time Out: [facility], PT   Visit # Therapist initials Date Arrived NS/ Cx < 24 hr >24 hr Cx Visit Comments   1 MM 3/9/22 X   Initial Assessment Only Today  Medicare   2 MM 3/16/22 X      3 MM 3/23/22 X                                                                                                                                               Abbreviations: NS = No Show; CX = cancelled        Future Appointments   Date Time Provider Celeste Landry   3/31/2022  1:00 PM [facility]   8/4/2022  3:00 PM Kaden Walker MD [facility]

## 2022-03-31 ENCOUNTER — HOSPITAL ENCOUNTER (OUTPATIENT)
Dept: PHYSICAL THERAPY | Age: 77
Discharge: HOME | End: 2022-03-31
Attending: PHYSICIAN ASSISTANT
Payer: MEDICARE

## 2022-03-31 PROCEDURE — 97110 THERAPEUTIC EXERCISES: CPT

## 2022-03-31 PROCEDURE — 97112 NEUROMUSCULAR REEDUCATION: CPT

## 2022-03-31 NOTE — PROGRESS NOTES
Sourav Gilman  : 1945  [place]  Secondary:  [address] at [place] [id number] [facility], [address] [place] [address],[address], [facility] [address].  [phone]   [phone]         OUTPATIENT PHYSICAL THERAPY: Daily Treatment Note 3/31/2022  Visit Count:  4    ICD-10: Treatment Diagnosis: other abnormalities of gait and mobility R26.89; Dizziness and giddiness R42  Precautions/Allergies:   Latex, Articaine, Clindamycin, Donepezil, Epinephrine, Erythromycin, Keflex [cephalexin], Penicillin g, Penicillins, Rivastigmine, Tramadol, Chlorzoxazone, Gabapentin, and Tetracyclines   TREATMENT PLAN:  Effective Dates: 3/9/2022 TO 2022 (90 days). Frequency/Duration: 1-2 times a week for 60- 90 Day(s)    Pre-treatment Symptoms/Complaints:  Falls, dizziness; \"My R hip/buttock has been hurting. I've been limping. No falls. I didn't take my meloxicam.\"  Pain: Initial:   4/10 Post Session:  4/10   Medications Last Reviewed:  3/31/2022  Updated Objective Findings:  None Today  TREATMENT:     NEUROMUSCULAR RE-EDUCATION: (30 minutes):  Exercise/activities per grid below to improve balance, coordination, kinesthetic sense, posture and proprioception. Required minimal verbal cues to promote static and dynamic balance in standing.   Activity   Date  3/16/22 Date  3/23/22 Date  3/31/22 Date Date Date   Activity/Exercise   Sets/reps/equipment Sets/reps/  equipment Sets/reps/  equipment Sets/reps/  equipment Sets/reps/  equipment Sets/reps/  equipment   Walking with head turns             Walking with head up & down             Step overs     Over 1/2 foam roll x 10 reps each leg fwd and laterally Over 1/2 foam roll x 10 reps each leg fwd and laterally Over 1/2 foam roll x 10 reps each leg fwd and laterally      Step taps     6 inch step x 20 reps fwd and cross 6 inch step x 20 reps fwd and cross 6 inch step x 20 reps fwd and cross      Marching   4 laps in real Not today; 4 laps in real 4 laps in real      Sidestepping   4 laps in real Not today: 4 laps in real 4 laps in real      Crossovers           [facility]  backwards             Tandem walking           Weaving in/out of cones             Picking up cones             Sports cord             [employer]           Kick ball           Figure 8s            Circles right/left           Walking with 360 degree turns           Spirals           Weight shifting:    Left & Right             Weight shifting: Forward & Backward              Static Standing Balance             Standing with feet apart     Blue foams eyes open and closed Blue foams eyes open and closed Blue foams eyes open and closed      Standing with feet together             Standing with feet semitandem   Blue foams eyes open and closed Blue foams eyes open and closed Blue foams eyes open and closed      Standing with feet tandem           Single leg stance           X1/X2 Viewing exercises             Hallpike-Amna testing for BPPV (Benign Paroxysmal Positional Vertigo)             Young-Daroff exercises           Canalith Repositioning treatment/Epley Maneuver  for BPPV (Benign Paroxysmal Positional Vertigo)           Smart Equitest Training: See scanned report. Therapeutic Exercise: ( 15 minutes):  Exercises per grid below to improve mobility, strength and balance. Required minimal verbal cues to promote proper body alignment, promote proper body posture and promote proper body mechanics. Progressed resistance, repetitions and complexity of movement as indicated.    Date:  3/16/22 Date:  3/23/22 Date:  3/31/22   Activity/Exercise Parameters Parameters Parameters   Step ups 6 inch step x 10 reps each leg with rail 6 inch step x 10 reps each leg with rail 6 inch step x 10 reps each leg with rail   Sit to stand X 10 reps from chair with no UE assist X 10 reps from chair with no UE assist X 10 reps from chair with no UE assist   nustep  Level 1 x 6 minutes Level 1 x 8 minutes                                 [employer] Portal  Treatment/Session Summary:    · Response to Treatment:  Patient tolerated session well. Patient did well with exercises and did not c/o R hip/buttock pain during exercises/mobility. Continue to progress as tolerated. · Equipment provided today:  None today  · Recommendations/Intent for next treatment session: Next visit will focus on balance training, safety with mobility.     Total Treatment Billable Duration: 45 minutes  PT Patient Time In/Time Out  Time In: 1300  Time Out: [address] Brando Carney PT   Visit # Therapist initials Date Arrived NS/ Cx < 24 hr >24 hr Cx Visit Comments   1 MM 3/9/22 X   Initial Assessment Only Today  Medicare   2 MM 3/16/22 X      3 MM 3/23/22 X      4 MM 3/31/22 X                                                                                                                                      Abbreviations: NS = No Show; CX = cancelled        Future Appointments   Date Time Provider Celeste Landry   4/13/2022  3:30 PM MD ASHLEY SchwabF CORDELLD   8/4/2022  3:00 PM MD ASHLEY Schwab PVF PVD

## 2022-04-07 ENCOUNTER — HOSPITAL ENCOUNTER (OUTPATIENT)
Dept: PHYSICAL THERAPY | Age: 77
Discharge: HOME | End: 2022-04-07
Attending: PHYSICIAN ASSISTANT
Payer: MEDICARE

## 2022-04-07 NOTE — PROGRESS NOTES
David Maki  : 1945  [place]  Secondary:  [address]  at [place] [id number] [facility], [address] [place] [address],[address], [facility] [address].  [phone]   [phone]          OUTPATIENT DAILY NOTE    NAME/AGE/GENDER: David Maki is a 78 y.o. female. DATE: 2022    Patient cancelled (less than 24 hours ago) her appointment for today due to no transportation. Will plan to follow up on next scheduled visit.     Grisel King PT    Future Appointments   Date Time Provider Celeste Landry   2022  3:30 PM MD ASHLEY Horta PVF PVD   2022  1:00 PM José Miguel Loera PT [facility] SFE   2022 10:15 AM ROCAEL Mart SFE   2022  1:00 PM ROCAEL MartEORPT SFE   2022  3:00 PM MD ASHLEY Horta PVF PVD

## 2022-04-14 ENCOUNTER — HOSPITAL ENCOUNTER (OUTPATIENT)
Dept: PHYSICAL THERAPY | Age: 77
Discharge: HOME | End: 2022-04-14
Attending: PHYSICIAN ASSISTANT
Payer: MEDICARE

## 2022-04-14 PROCEDURE — 97112 NEUROMUSCULAR REEDUCATION: CPT

## 2022-04-14 PROCEDURE — 97110 THERAPEUTIC EXERCISES: CPT

## 2022-04-14 NOTE — PROGRESS NOTES
Dagoberto Bell  : 1945  [place]  Secondary:  [address] at [place] [id number] [facility], [address] [place] [address],[address], [facility] [address]  [phone]   [phone]         OUTPATIENT PHYSICAL THERAPY: Daily Treatment Note 2022  Visit Count:  5    ICD-10: Treatment Diagnosis: other abnormalities of gait and mobility R26.89; Dizziness and giddiness R42  Precautions/Allergies:   Latex, Articaine, Clindamycin, Donepezil, Epinephrine, Erythromycin, Keflex [cephalexin], Penicillin g, Penicillins, Rivastigmine, Tramadol, Chlorzoxazone, Gabapentin, and Tetracyclines   TREATMENT PLAN:  Effective Dates: 3/9/2022 TO 2022 (90 days). Frequency/Duration: 1-2 times a week for 60- 90 Day(s)    Pre-treatment Symptoms/Complaints:  Falls, dizziness; \"I didn't sleep at all last night. \"  Pain: Initial:   4/10 Post Session:  4/10   Medications Last Reviewed:  2022  Updated Objective Findings:  None Today  TREATMENT:     NEUROMUSCULAR RE-EDUCATION: (30 minutes):  Exercise/activities per grid below to improve balance, coordination, kinesthetic sense, posture and proprioception. Required minimal verbal cues to promote static and dynamic balance in standing.   Activity   Date  3/16/22 Date  3/23/22 Date  3/31/22 Date  22 Date Date   Activity/Exercise   Sets/reps/equipment Sets/reps/  equipment Sets/reps/  equipment Sets/reps/  equipment Sets/reps/  equipment Sets/reps/  equipment   Walking with head turns             Walking with head up & down             Step overs     Over 1/2 foam roll x 10 reps each leg fwd and laterally Over 1/2 foam roll x 10 reps each leg fwd and laterally Over 1/2 foam roll x 10 reps each leg fwd and laterally Over 1/2 foam roll x 10 reps each leg fwd and laterally     Step taps     6 inch step x 20 reps fwd and cross 6 inch step x 20 reps fwd and cross 6 inch step x 20 reps fwd and cross 6 inch step x 20 reps fwd and cross     Marching   4 laps in real Not today; 4 laps in real 4 laps in real 4 laps in real     Sidestepping   4 laps in real Not today: 4 laps in real 4 laps in real 4 laps in real     Crossovers           [employer]  backwards             Tandem walking           Weaving in/out of cones             Picking up cones             Sports cord             [employer]           Kick ball           Figure 8s            Circles right/left           Walking with 360 degree turns           Spirals           Weight shifting:    Left & Right             Weight shifting: Forward & Backward              Static Standing Balance             Standing with feet apart     Blue foams eyes open and closed Blue foams eyes open and closed Blue foams eyes open and closed Blue foams eyes open and closed     Standing with feet together             Standing with feet semitandem   Blue foams eyes open and closed Blue foams eyes open and closed Blue foams eyes open and closed Blue foams eyes open and closed     Standing with feet tandem           Single leg stance           X1/X2 Viewing exercises             Hallpike-Amna testing for BPPV (Benign Paroxysmal Positional Vertigo)             Young-Daroff exercises           Canalith Repositioning treatment/Epley Maneuver  for BPPV (Benign Paroxysmal Positional Vertigo)           Smart Equitest Training: See scanned report. Therapeutic Exercise: ( 15 minutes):  Exercises per grid below to improve mobility, strength and balance. Required minimal verbal cues to promote proper body alignment, promote proper body posture and promote proper body mechanics. Progressed resistance, repetitions and complexity of movement as indicated.      Date:  3/16/22 Date:  3/23/22 Date:  3/31/22 Date  4/14/22   Activity/Exercise Parameters Parameters Parameters    Step ups 6 inch step x 10 reps each leg with rail 6 inch step x 10 reps each leg with rail 6 inch step x 10 reps each leg with rail 6 inch step x 10 reps each leg with rail   Sit to stand X 10 reps from chair with no UE assist X 10 reps from chair with no UE assist X 10 reps from chair with no UE assist X 10 reps from chair with no UE assist   nustep  Level 1 x 6 minutes Level 1 x 8 minutes Level 2 x 8 minutes                                     [employer] Portal  Treatment/Session Summary:    · Response to Treatment:  Patient tolerated session well. She was able to increase resistance on nustep today. She was able to use less UE assist for standing balance activities. Continue to progress as tolerated. · Equipment provided today:  None today  · Recommendations/Intent for next treatment session: Next visit will focus on balance training, safety with mobility.     Total Treatment Billable Duration: 45 minutes  PT Patient Time In/Time Out  Time In: 6112  Time Out: [address] Luis Inches, PT     Visit # Therapist initials Date Arrived NS/ Cx < 24 hr >24 hr Cx Visit Comments   1 MM 3/9/22 X   Initial Assessment Only Today  Medicare   2 MM 3/16/22 X      3 MM 3/23/22 X      4 MM 3/31/22 X      X MM 4/7/22  cx     5 MM 4/14/22 X                                                                                                                    Abbreviations: NS = No Show; CX = cancelled        Future Appointments   Date Time Provider Celeste Landry   4/20/2022 10:15 AM ROCAEL Cunha   4/27/2022  1:00 PM ROCAEL CunhaEORPORLIN SFE   8/4/2022  3:00 PM Trace Lu MD SSA PVF PVD

## 2022-04-20 ENCOUNTER — HOSPITAL ENCOUNTER (OUTPATIENT)
Dept: PHYSICAL THERAPY | Age: 77
Discharge: HOME | End: 2022-04-20
Attending: PHYSICIAN ASSISTANT
Payer: MEDICARE

## 2022-04-20 PROCEDURE — 97112 NEUROMUSCULAR REEDUCATION: CPT

## 2022-04-20 PROCEDURE — 97110 THERAPEUTIC EXERCISES: CPT

## 2022-04-20 NOTE — PROGRESS NOTES
James Welch  : 1945  [place]  Secondary:  [address] at [place] [id number] [facility], [address] [place] [address],[address], [place] [address].  [phone]   [phone]         OUTPATIENT PHYSICAL THERAPY: Daily Treatment Note 2022  Visit Count:  6    ICD-10: Treatment Diagnosis: other abnormalities of gait and mobility R26.89; Dizziness and giddiness R42  Precautions/Allergies:   Latex, Articaine, Clindamycin, Donepezil, Epinephrine, Erythromycin, Keflex [cephalexin], Penicillin g, Penicillins, Rivastigmine, Tramadol, Chlorzoxazone, Gabapentin, and Tetracyclines   TREATMENT PLAN:  Effective Dates: 3/9/2022 TO 2022 (90 days). Frequency/Duration: 1-2 times a week for 60- 90 Day(s)    Pre-treatment Symptoms/Complaints:  Falls, dizziness; \"pretty much no dizziness. \"  Pain: Initial:   4/10 R hip Post Session:   4/10 R hip   Medications Last Reviewed:  2022  Updated Objective Findings:  None Today  TREATMENT:     NEUROMUSCULAR RE-EDUCATION: (30 minutes):  Exercise/activities per grid below to improve balance, coordination, kinesthetic sense, posture and proprioception. Required minimal verbal cues to promote static and dynamic balance in standing.   Activity   Date  3/16/22 Date  3/23/22 Date  3/31/22 Date  22 Date  22 Date   Activity/Exercise   Sets/reps/equipment Sets/reps/  equipment Sets/reps/  equipment Sets/reps/  equipment Sets/reps/  equipment Sets/reps/  equipment   Walking with head turns             Walking with head up & down             Step overs     Over 1/2 foam roll x 10 reps each leg fwd and laterally Over 1/2 foam roll x 10 reps each leg fwd and laterally Over 1/2 foam roll x 10 reps each leg fwd and laterally Over 1/2 foam roll x 10 reps each leg fwd and laterally Over 1/2 foam roll x 10 reps each leg fwd and laterally    Step taps     6 inch step x 20 reps fwd and cross 6 inch step x 20 reps fwd and cross 6 inch step x 20 reps fwd and cross 6 inch step x 20 reps fwd and cross 6 inch step x 20 reps fwd and cross    Marching   4 laps in real Not today; 4 laps in real 4 laps in real 4 laps in real 4 laps in real    Sidestepping   4 laps in real Not today: 4 laps in real 4 laps in real 4 laps in real 4 laps in real    Crossovers           [employer]           Walking  backwards             Tandem walking           Weaving in/out of cones             Picking up cones             Sports cord             [employer]           Kick ball           Figure 8s            Circles right/left           Walking with 360 degree turns           Spirals           Weight shifting:    Left & Right             Weight shifting: Forward & Backward              Static Standing Balance             Standing with feet apart     Blue foams eyes open and closed Blue foams eyes open and closed Blue foams eyes open and closed Blue foams eyes open and closed Blue foams eyes open and closed    Standing with feet together             Standing with feet semitandem   Blue foams eyes open and closed Blue foams eyes open and closed Blue foams eyes open and closed Blue foams eyes open and closed Blue foams eyes open and closed    Standing with feet tandem           Single leg stance           X1/X2 Viewing exercises             Hallpike-Paxinos testing for BPPV (Benign Paroxysmal Positional Vertigo)             Young-Daroff exercises           Canalith Repositioning treatment/Epley Maneuver  for BPPV (Benign Paroxysmal Positional Vertigo)           Smart Equitest Training: See scanned report. Therapeutic Exercise: ( 15 minutes):  Exercises per grid below to improve mobility, strength and balance. Required minimal verbal cues to promote proper body alignment, promote proper body posture and promote proper body mechanics. Progressed resistance, repetitions and complexity of movement as indicated.      Date:  3/16/22 Date:  3/23/22 Date:  3/31/22 Date  4/14/22 Date  4/20/22 Activity/Exercise Parameters Parameters Parameters     Step ups 6 inch step x 10 reps each leg with rail 6 inch step x 10 reps each leg with rail 6 inch step x 10 reps each leg with rail 6 inch step x 10 reps each leg with rail 6 inch step x 10 reps each leg with rail   Sit to stand X 10 reps from chair with no UE assist X 10 reps from chair with no UE assist X 10 reps from chair with no UE assist X 10 reps from chair with no UE assist X 10 reps from chair with no UE assist   nustep  Level 1 x 6 minutes Level 1 x 8 minutes Level 2 x 8 minutes Level 3 x 8 minutes                                         [employer] Portal  Treatment/Session Summary:    · Response to Treatment:  Patient tolerated session well. Balance continues to improve, but patient has some pain with R LE stance due to hip OA. Continue to progress as tolerated. REASSESS NEXT SESSION FOR PN. ·   · Equipment provided today:  None today  · Recommendations/Intent for next treatment session: Next visit will focus on balance training, safety with mobility.     Total Treatment Billable Duration: 45 minutes  PT Patient Time In/Time Out  Time In: 1015  Time Out: [address] [place], PT     Visit # Therapist initials Date Arrived NS/ Cx < 24 hr >24 hr Cx Visit Comments   1 MM 3/9/22 X   Initial Assessment Only Today  Medicare   2 MM 3/16/22 X      3 MM 3/23/22 X      4 MM 3/31/22 X      X MM 4/7/22  cx     5 MM 4/14/22 X      6 MM 4/20/22 X                                                                                                           Abbreviations: NS = No Show; CX = cancelled        Future Appointments   Date Time Provider Celeste Landry   4/27/2022  1:00 PM José Miguel Loera, ROCAEL [facility] SFE   8/4/2022  3:00 PM Sherin Zelaya MD SSA PVF PVD

## 2022-04-27 ENCOUNTER — HOSPITAL ENCOUNTER (OUTPATIENT)
Dept: PHYSICAL THERAPY | Age: 77
Discharge: HOME | End: 2022-04-27
Attending: PHYSICIAN ASSISTANT
Payer: MEDICARE

## 2022-04-27 PROCEDURE — 97110 THERAPEUTIC EXERCISES: CPT

## 2022-04-27 PROCEDURE — 97112 NEUROMUSCULAR REEDUCATION: CPT

## 2022-04-27 NOTE — PROGRESS NOTES
Dmitriy [place]  : 1945  [place]  Secondary:  [address] at [facility]  [facility], [address] [place] [address],[address], [facility]  [phone]   [phone]           OUTPATIENT PHYSICAL THERAPY:Progress Report 2022   ICD-10: Treatment Diagnosis: other abnormalities of gait and mobility R26.89; Dizziness and giddiness R42  Precautions/Allergies:   Latex, Articaine, Clindamycin, Donepezil, Epinephrine, Erythromycin, Keflex [cephalexin], Penicillin g, Penicillins, Rivastigmine, Tramadol, Chlorzoxazone, Gabapentin, and Tetracyclines   TREATMENT PLAN:  Effective Dates: 3/9/2022 TO 2022 (90 days). Frequency/Duration: 1-2 times a week for 60- 90 Day(s) MEDICAL/REFERRING DIAGNOSIS:  Benign paroxysmal vertigo, left ear [H81.12]   DATE OF ONSET: 22  REFERRING PHYSICIAN: Rupa Leary*  MD Orders: referral to PT  Return MD Appointment:      PROGRESS NOTE 22:   Ms. Mainor Uribe has attended 7 PT sessions from 3/9/22 to 22 for history of recent fall, hitting the back of her head, and dizziness. Patient is demonstrating slowly improving balance with therapy. Patient demonstrates improved balance with testing with a score of 43/56 on the Blanton Balance Scale and a score of 17/24 on the Dynamic Gait Index. Patient would benefit from continuing PT to address these problems to improve patient's independence and safety with mobility and daily activities. Thank you. PROBLEM LIST (Impacting functional limitations):  1. Decreased Strength  2. Decreased ADL/Functional Activities  3. Decreased Transfer Abilities  4. Decreased Ambulation Ability/Technique  5. Decreased Balance  6. Decreased Activity Tolerance  7. Decreased [place] with Home Exercise Program INTERVENTIONS PLANNED: (Treatment may consist of any combination of the following)  1. Balance Exercise  2.  Home Exercise Program (HEP)  3. Neuromuscular Re-education/Strengthening  4. Therapeutic Activites  5. Therapeutic Exercise/Strengthening     GOALS: (Goals have been discussed and agreed upon with patient.)  Short-Term Functional Goals: Time Frame: 2-4 weeks  1. Patient will demonstrate independence and compliance with home exercise program to improve balance and dizziness for daily activities. MET  2. Patient will increase her score on the Blanton Balance Scale to greater than or equal to 43/56 indicating improved safety and decreased fall risk for daily activities. MET  3. Discharge Goals: Time Frame: 8-12 weeks  1. Patient will report decreased dizziness to less than or equal to 1-2/10 with daily activities to improve safety and quality of life. Progressing and ongoing  2. Patient will increase her score on the dynamic gait index to greater than or equal to 18/24 indicating improved balance and safety for community ambulation. Progressing and ongoing  3. Patient will increase her score on the Blanton Balance Scale to greater than or equal to 48/56 indicating improved safety and decreased fall risk for daily activities. Progressing and ongoing  4. Patient will ambulate with least assistive device over level and unlevel surfaces without evidence of imbalance to improve safety for daily activities. Progressing and ongoing      OUTCOME MEASURE:   Tool Used: Blanton Balance Scale  Score:  Initial: 40/56 Most Recent: 43/56 (Date: 4/27/22 )   Interpretation of Score: Each section is scored on a 0-4 scale, 0 representing the patients inability to perform the task and 4 representing independence. The scores of each section are added together for a total score of 56. The higher the patients score, the more independent the patient is. Any score below 45 indicates increased risk for falls.       Tool Used: Dynamic Gait Index  Score:  Initial: 14/24 Most Recent: 17/24 (Date: 4/27/22 )   Interpretation of Score: Each section is scored on a 0-3 scale, 0 representing the patients inability to perform the task and 3 representing independence. The scores of each section are added together for a total score of 24. Any score below 19 indicates increased risk for falls. MEDICAL NECESSITY:   · Patient is expected to demonstrate progress in strength, balance and functional technique to improve safety during daily activities. REASON FOR SERVICES/OTHER COMMENTS:  · Patient continues to demonstrate capacity to improve dizziness, balance, gait which will increase independence and increase safety. Total Duration:  PT Patient Time In/Time Out  Time In: 1259  Time Out: 1344    Rehabilitation Potential For Stated Goals: Good       PAIN/SUBJECTIVE:   Initial:    Post Session:  0/10   HISTORY:   History of Injury/Illness (Reason for Referral):  Brother brought her to PT, and spoke to PT privately to discuss patient's primary reason for being here, dizziness and fall. No falls since, but near falls. Brother says patient is mentally ill and is not sure what patient would say about what happened. Patient has no c/o dizziness lately. Patient is a poor historian. Pt says R leg gives her problems with balance. Recently on CPAP.  2/26/22 fell and hit back of head. Dizzy since day before. Was prescribed antivert at [facility] in [facility]. Past Medical History/Comorbidities:   Ms. Narcisa Molina  has a past medical history of Multinodular goiter (5/9/2016).     She has no past medical history of Aneurysm ([address] Arrhythmia, Autoimmune disease ([address] CAD (coronary artery disease), Chronic kidney disease, Chronic pain, Coagulation defects, COPD, Diabetes ([address] Difficult intubation, Heart failure ([address] Liver disease, Malignant hyperthermia due to anesthesia, Morbid obesity ([address] Nausea & vomiting, Other ill-defined conditions(799.89), Pseudocholinesterase deficiency, PUD (peptic ulcer disease), Seizures ([address] Stroke ([address] Thromboembolus ([address] Unspecified adverse effect of anesthesia, or Unspecified sleep apnea.  Ms. Clare Mc  has a past surgical history that includes hx cholecystectomy; hx gyn; hx heent; hx heent; hx other surgical; hx breast lumpectomy (6/14/2013); hx breast biopsy (6/14/2013); and hx heent. Social History/Living Environment:     lives alone, first floor. Drives but doesn't have a car so brother takes her places. No AD  Prior Level of Function/Work/Activity:  independent  Dominant Side:         LEFT  Personal Factors:          Sex:  female        Age:  78 y.o. Ambulatory/Rehab Services H2 Model Falls Risk Assessment   Risk Factors:       (4)  Confusion/Disorientation/Impulsivity       (1)  Dizziness/Vertigo       (5)  History of Recent Falls [w/in 3 months] Ability to Rise from Chair:       (0)  Ability to rise in a single movement   Falls Prevention Plan:       Exercise/Equipment Adaptation (specify):  close supervision during mobility/balance activities   Total: (5 or greater = High Risk): 10   ©2010 [facility] of [address] Patent #[id number]. Federal Law prohibits the replication, distribution or use without written permission from [facility] [employer]eller   Current Medications:       Current Outpatient Medications:     montelukast (SINGULAIR) 10 mg tablet, Take 10 mg by mouth daily. , Disp: , Rfl:     metoprolol tartrate (LOPRESSOR) 25 mg tablet, Take 1 Tablet by mouth two (2) times a day., Disp: 180 Tablet, Rfl: 3    melatonin 3 mg tablet, Take  by mouth., Disp: , Rfl:     cpap machine kit, by Does Not Apply route., Disp: , Rfl:     aspirin 81 mg chewable tablet, Take 81 mg by mouth daily. (Patient not taking: Reported on 3/16/2022), Disp: , Rfl:     fluticasone propionate (FLONASE) 50 mcg/actuation nasal spray, SPRAY 2 SPRAYS INTO EACH NOSTRIL EVERY DAY, Disp: 3 Each, Rfl: 3    OLANZapine (ZyPREXA) 5 mg tablet, Take  by mouth nightly.  Take 5 mg, one tab in the morning and 10 mg, 2 tabs at night., Disp: , Rfl:     diphenhydrAMINE (BenadryL) 25 mg capsule, Take 25 mg by mouth every six (6) hours as needed. Take one to two capsules as needed for allergies. , Disp: , Rfl:     albuterol (Proventil HFA) 90 mcg/actuation inhaler, Take 1 Puff by inhalation every four (4) hours as needed for Wheezing. Take morning of surgery if needed and bring to hospital, Disp: 1 Each, Rfl: 5    lidocaine-menthol 4-1 % lqro, by Apply Externally route. Use as needed for pain, Disp: , Rfl:     ketoconazole (NIZORAL) 2 % shampoo, Use PRN, Disp: , Rfl:     meloxicam (MOBIC) 7.5 mg tablet, Take 1 Tablet by mouth two (2) times a day., Disp: 180 Tablet, Rfl: 3    budesonide-formoteroL (Symbicort) 160-4.5 mcg/actuation HFAA, Take 1 Puff by inhalation two (2) times a day., Disp: 3 Inhaler, Rfl: 3    magnesium gluconate 500 mg (27 mg  elemental) tablet, Take  by mouth two (2) times a day., Disp: , Rfl:     COQ10, LIPOSOMAL UBIQUINOL, PO, Take  by mouth., Disp: , Rfl:     ZINC ACETATE PO, Take  by mouth., Disp: , Rfl:     polyethylene glycol 3350 (MIRALAX PO), Take  by mouth., Disp: , Rfl:     vit C/ascorbate calcium,sodium (VIT C-ASCORBATE CA-ASCORB SOD PO), Take  by mouth., Disp: , Rfl:     Adenosine Triphosphate 25 mg TbEC, Take 1 Tab by mouth., Disp: , Rfl:     vitamin e (E GEMS) 1,000 unit capsule, Take 1,000 Units by mouth daily. , Disp: , Rfl:     cholecalciferol (VITAMIN D3) 1,000 unit tablet, Take  by mouth daily. , Disp: , Rfl:     fexofenadine (ALLEGRA) 180 mg tablet, Take  by mouth daily. , Disp: , Rfl:     cyanocobalamin (VITAMIN B12) 500 mcg tablet, Take 500 mcg by mouth daily. , Disp: , Rfl:    Date Last Reviewed:  4/27/22   Number of Personal Factors/Comorbidities that affect the Plan of Care: 3+: HIGH COMPLEXITY   EXAMINATION:   Observation/Orthostatic Postural Assessment:          Fwd head posture  Strength:         LE STRENGTH:  4/5 L 4-/5R hip flexion, 4/5 hip abduction, 4/5 hip adduction, 4/5 hip extension, 4/5L 4-/5R knee extension, 4/5 knee flexion, 4/5 ankle dorsiflexion, 4/5 ankle plantarflexion      Functional Mobility:         Gait/Ambulation:  Patient ambulates with no AD, demonstrating decreased step length and heel strike B, slow pace, mild path deviations. Transfers:  independent        Bed Mobility:  independent        Stairs:  With rail  Sensation:         intact  Postural Control & Balance:  · Blanton Balance Scale:  40/56.   (A score less than 45/56 indicates high risk of falls)     · Dynamic Gait Index:  14/24.   (A score less than or equal to19/24 is abnormal and predictive of falls)         Oculomotor Exam:  · Eye Range of Motion:  full range of motion  · Cervical Range of Motion:   diminished range of motion  · Spontaneous nystagmus:  NO  · Gaze holding nystagmus:  NO  · Skew Deviation:  none  · Smooth Pursuit:      [x]Smooth Eye Movements    []Delayed Eye Movements     []Within Normal Limits     []Other(comment): · Voluntary Saccades:      [x]Smooth Eye Movements    []Delayed Eye Movements     []Within Normal Limits     []Other(comment): · Optokinetic cloth: NT  Vestibular Ocular Reflex Testing:  · Dynamic Visual Acuity Test: NT  · Head Thrust Test: Negative to the right. Negative to the left. · VOR Cancellation: WNL  Position Tests:  · Hallpike-Amna testing presented as negative indicating that Benign Paroxysmal Positional Vertigo (BPPV) is absent bilaterally. Body Structures Involved:  1. Nerves  2. Eyes and Ears  3. Muscles Body Functions Affected:  1. Sensory/Pain  2. Neuromusculoskeletal  3. Movement Related Activities and Participation Affected:  1. General Tasks and Demands  2. Mobility  3. Domestic Life  4.  Community, Social and [place]   Number of elements (examined above) that affect the Plan of Care: 3: MODERATE COMPLEXITY   CLINICAL PRESENTATION:   Presentation: Evolving clinical presentation with changing clinical characteristics: MODERATE COMPLEXITY   CLINICAL DECISION MAKING:   Use of outcome tool(s) and clinical judgement create a POC that gives a: Questionable prediction of patient's progress: MODERATE COMPLEXITY

## 2022-04-27 NOTE — PROGRESS NOTES
Gamaliel Heart  : 1945  [place]  Secondary:  [address] at [place] [id number] [facility], [address] [place] [address],[address], [employer] [facility] [employer] [facility].  [phone]   [phone]         OUTPATIENT PHYSICAL THERAPY: Daily Treatment Note 2022  Visit Count:  7    ICD-10: Treatment Diagnosis: other abnormalities of gait and mobility R26.89; Dizziness and giddiness R42  Precautions/Allergies:   Latex, Articaine, Clindamycin, Donepezil, Epinephrine, Erythromycin, Keflex [cephalexin], Penicillin g, Penicillins, Rivastigmine, Tramadol, Chlorzoxazone, Gabapentin, and Tetracyclines   TREATMENT PLAN:  Effective Dates: 3/9/2022 TO 2022 (90 days). Frequency/Duration: 1-2 times a week for 60- 90 Day(s)    Pre-treatment Symptoms/Complaints:  Falls, dizziness; \"Doing okay. My hip hurts today. I'm moving slow. \"  Pain: Initial:   4/10 R hip Post Session:   4/10 R hip   Medications Last Reviewed:  2022  Updated Objective Findings:  None Today  TREATMENT:     NEUROMUSCULAR RE-EDUCATION: (30 minutes):  Exercise/activities per grid below to improve balance, coordination, kinesthetic sense, posture and proprioception. Required minimal verbal cues to promote static and dynamic balance in standing.   Activity   Date  3/16/22 Date  3/23/22 Date  3/31/22 Date  22 Date  22 Date  22   Activity/Exercise   Sets/reps/equipment Sets/reps/  equipment Sets/reps/  equipment Sets/reps/  equipment Sets/reps/  equipment Sets/reps/  equipment   Walking with head turns             Walking with head up & down             Step overs     Over 1/2 foam roll x 10 reps each leg fwd and laterally Over 1/2 foam roll x 10 reps each leg fwd and laterally Over 1/2 foam roll x 10 reps each leg fwd and laterally Over 1/2 foam roll x 10 reps each leg fwd and laterally Over 1/2 foam roll x 10 reps each leg fwd and laterally Over 1/2 foam roll x 10 reps each leg fwd and laterally   Step taps     6 inch step x 20 reps fwd and cross 6 inch step x 20 reps fwd and cross 6 inch step x 20 reps fwd and cross 6 inch step x 20 reps fwd and cross 6 inch step x 20 reps fwd and cross 6 inch step x 20 reps fwd and cross   Marching   4 laps in real Not today; 4 laps in real 4 laps in real 4 laps in real 4 laps in real 4 laps in real   Sidestepping   4 laps in real Not today: 4 laps in real 4 laps in real 4 laps in real 4 laps in real 4 laps in real   Crossovers           [employer]           Walking  backwards             Tandem walking           Weaving in/out of cones             Picking up cones             Sports cord             [employer]           Kick ball           Figure 8s            Circles right/left           Walking with 360 degree turns           Spirals           Weight shifting:    Left & Right             Weight shifting: Forward & Backward              Static Standing Balance             Standing with feet apart     Blue foams eyes open and closed Blue foams eyes open and closed Blue foams eyes open and closed Blue foams eyes open and closed Blue foams eyes open and closed Blue foams eyes open and closed   Standing with feet together             Standing with feet semitandem   Blue foams eyes open and closed Blue foams eyes open and closed Blue foams eyes open and closed Blue foams eyes open and closed Blue foams eyes open and closed Blue foams eyes open and closed   Standing with feet tandem           Single leg stance           X1/X2 Viewing exercises             Hallpike-Amna testing for BPPV (Benign Paroxysmal Positional Vertigo)             Young-Devon exercises           Canalith Repositioning treatment/Epley Maneuver  for BPPV (Benign Paroxysmal Positional Vertigo)           Smart Equitest Training: See scanned report. Therapeutic Exercise: ( 15 minutes):  Exercises per grid below to improve mobility, strength and balance.   Required minimal verbal cues to promote proper body alignment, promote proper body posture and promote proper body mechanics. Progressed resistance, repetitions and complexity of movement as indicated. Date:  3/16/22 Date:  3/23/22 Date:  3/31/22 Date  4/14/22 Date  4/20/22 Date   4/27/22   Activity/Exercise Parameters Parameters Parameters      Step ups 6 inch step x 10 reps each leg with rail 6 inch step x 10 reps each leg with rail 6 inch step x 10 reps each leg with rail 6 inch step x 10 reps each leg with rail 6 inch step x 10 reps each leg with rail 6 inch step x 10 reps each leg with rail   Sit to stand X 10 reps from chair with no UE assist X 10 reps from chair with no UE assist X 10 reps from chair with no UE assist X 10 reps from chair with no UE assist X 10 reps from chair with no UE assist X 10 reps from chair with no UE assist   nustep  Level 1 x 6 minutes Level 1 x 8 minutes Level 2 x 8 minutes Level 3 x 8 minutes Level 3 x 8 minutes                                             [employer] Portal  Treatment/Session Summary:    · Response to Treatment:  Patient tolerated session well. Balance and activity tolerance have improved. R hip has been bothering her due to OA. Discussed that she may want to see her orthopedic MD again as she had a cortisone shot over a year ago which helped at the time. Will continue a few more sessions. Continue to progress as tolerated. · Equipment provided today:  None today  · Recommendations/Intent for next treatment session: Next visit will focus on balance training, safety with mobility.     Total Treatment Billable Duration: 45 minutes  PT Patient Time In/Time Out  Time In: 1259  Time Out: 125 [facility], PT     Visit # Therapist initials Date Arrived NS/ Cx < 24 hr >24 hr Cx Visit Comments   1 MM 3/9/22 X   Initial Assessment Only Today  Medicare   2 MM 3/16/22 X      3 MM 3/23/22 X      4 MM 3/31/22 X      X MM 4/7/22  cx     5 MM 4/14/22 X      6 MM 4/20/22 X      7 MM 4/27/22 X   PN Abbreviations: NS = No Show; CX = cancelled        Future Appointments   Date Time Provider Celeste Gris   4/27/2022  1:00 PM Angel Hernandez PT [facility]   8/4/2022  3:00 PM Tomy Howard MD [facility]

## 2022-05-02 ENCOUNTER — HOSPITAL ENCOUNTER (OUTPATIENT)
Dept: PHYSICAL THERAPY | Age: 77
Setting detail: RECURRING SERIES
Discharge: HOME | End: 2022-05-05

## 2022-05-05 ENCOUNTER — HOSPITAL ENCOUNTER (OUTPATIENT)
Dept: PHYSICAL THERAPY | Age: 77
Discharge: HOME | End: 2022-05-05
Attending: PHYSICIAN ASSISTANT
Payer: MEDICARE

## 2022-05-05 PROCEDURE — 97112 NEUROMUSCULAR REEDUCATION: CPT

## 2022-05-05 PROCEDURE — 97110 THERAPEUTIC EXERCISES: CPT

## 2022-05-05 NOTE — PROGRESS NOTES
Moises Nielsen  : 1945  [place]  Secondary:  [address] at [address] [facility]  [address], [address], [facility] [address]  [phone]   [phone]         OUTPATIENT PHYSICAL THERAPY: Daily Treatment Note 2022  Visit Count:  8    ICD-10: Treatment Diagnosis: other abnormalities of gait and mobility R26.89; Dizziness and giddiness R42  Precautions/Allergies:   Latex, Articaine, Clindamycin, Donepezil, Epinephrine, Erythromycin, Keflex [cephalexin], Penicillin g, Penicillins, Rivastigmine, Tramadol, Chlorzoxazone, Gabapentin, and Tetracyclines   TREATMENT PLAN:  Effective Dates: 3/9/2022 TO 2022 (90 days). Frequency/Duration: 1-2 times a week for 60- 90 Day(s)    Pre-treatment Symptoms/Complaints:  Falls, dizziness; \"R hip hurt after last time. I think it was the stepping sideways over the foam roll exercise. Had a cortisone shot R hip. Feeling better. \"  Pain: Initial:   2/10 R hip Post Session:   2/10 R hip   Medications Last Reviewed:  2022  Updated Objective Findings:  None Today  TREATMENT:     NEUROMUSCULAR RE-EDUCATION: (25 minutes):  Exercise/activities per grid below to improve balance, coordination, kinesthetic sense, posture and proprioception. Required minimal verbal cues to promote static and dynamic balance in standing.   Activity   Date  3/16/22 Date  3/23/22 Date  3/31/22 Date  22 Date  22 Date  22 Date   22   Activity/Exercise   Sets/reps/equipment Sets/reps/  equipment Sets/reps/  equipment Sets/reps/  equipment Sets/reps/  equipment Sets/reps/  equipment    Walking with head turns              Walking with head up & down              Step overs     Over 1/2 foam roll x 10 reps each leg fwd and laterally Over 1/2 foam roll x 10 reps each leg fwd and laterally Over 1/2 foam roll x 10 reps each leg fwd and laterally Over 1/2 foam roll x 10 reps each leg fwd and laterally Over 1/2 foam roll x 10 reps each leg fwd and laterally Over 1/2 foam roll x 10 reps each leg fwd and laterally    Step taps     6 inch step x 20 reps fwd and cross 6 inch step x 20 reps fwd and cross 6 inch step x 20 reps fwd and cross 6 inch step x 20 reps fwd and cross 6 inch step x 20 reps fwd and cross 6 inch step x 20 reps fwd and cross 6 inch step x 20 reps fwd and cross   Marching   4 laps in real Not today; 4 laps in real 4 laps in real 4 laps in real 4 laps in real 4 laps in real 4 laps in real   Sidestepping   4 laps in real Not today: 4 laps in real 4 laps in real 4 laps in real 4 laps in real 4 laps in real    Crossovers            [employer]            Walking  backwards              Tandem walking            Weaving in/out of cones              Picking up cones              Sports cord              Hero Rapp toss            Kick ball            Figure 8s             Circles right/left            Walking with 360 degree turns            Spirals            Weight shifting:    Left & Right              Weight shifting:   Forward & Backward               Static Standing Balance              Standing with feet apart     Blue foams eyes open and closed Blue foams eyes open and closed Blue foams eyes open and closed Blue foams eyes open and closed Blue foams eyes open and closed Blue foams eyes open and closed Blue foams eyes open and closed   Standing with feet together              Standing with feet semitandem   Blue foams eyes open and closed Blue foams eyes open and closed Blue foams eyes open and closed Blue foams eyes open and closed Blue foams eyes open and closed Blue foams eyes open and closed Blue foams eyes open and closed   Standing with feet tandem            Single leg stance            X1/X2 Viewing exercises              Hallpike-Amna testing for BPPV (Benign Paroxysmal Positional Vertigo)              Hector-Devon exercises            Canalith Repositioning treatment/Epley Maneuver  for BPPV (Benign Paroxysmal Positional Vertigo)            Smart Equitest Training: See scanned report. Therapeutic Exercise: ( 15 minutes):  Exercises per grid below to improve mobility, strength and balance. Required minimal verbal cues to promote proper body alignment, promote proper body posture and promote proper body mechanics. Progressed resistance, repetitions and complexity of movement as indicated. Date:  3/16/22 Date:  3/23/22 Date:  3/31/22 Date  4/14/22 Date  4/20/22 Date   4/27/22 Date   5/5/22   Activity/Exercise Parameters Parameters Parameters       Step ups 6 inch step x 10 reps each leg with rail 6 inch step x 10 reps each leg with rail 6 inch step x 10 reps each leg with rail 6 inch step x 10 reps each leg with rail 6 inch step x 10 reps each leg with rail 6 inch step x 10 reps each leg with rail 4 inch step x 10 reps each leg with rail   Sit to stand X 10 reps from chair with no UE assist X 10 reps from chair with no UE assist X 10 reps from chair with no UE assist X 10 reps from chair with no UE assist X 10 reps from chair with no UE assist X 10 reps from chair with no UE assist X 10 reps from chair with no UE assist   nustep  Level 1 x 6 minutes Level 1 x 8 minutes Level 2 x 8 minutes Level 3 x 8 minutes Level 3 x 8 minutes Level 4 x 8 minutes                                                 [employer] Portal  Treatment/Session Summary:    · Response to Treatment:  Patient tolerated session well. Patient reported no increase in pain with exercises today. Encouraging patient to talk to [facility] and/or [place] so she can continue exercises on her own. Continue to progress as tolerated. · Equipment provided today:  None today  · Recommendations/Intent for next treatment session: Next visit will focus on balance training, safety with mobility.     Total Treatment Billable Duration: 40 minutes  PT Patient Time In/Time Out  Time In: 0930  Time Out: [address]     Visit # Therapist initials Date Arrived NS/ Cx < 24 hr >24 hr Cx Visit Comments   1 MM 3/9/22 X   Initial Assessment Only Today  Medicare   2 MM 3/16/22 X      3 MM 3/23/22 X      4 MM 3/31/22 X      X MM 4/7/22  cx     5 MM 4/14/22 X      6 MM 4/20/22 X      7 MM 4/27/22 X   PN   8 MM 5/5/22 X                                                                                         Abbreviations: NS = No Show; CX = cancelled        Future Appointments   Date Time Provider Celeste Gris   5/11/2022  1:00 PM Kenna Moreno PT [facility] SFE   8/4/2022  3:00 PM Azalea Varma MD [facility]

## 2022-05-11 ENCOUNTER — HOSPITAL ENCOUNTER (OUTPATIENT)
Dept: PHYSICAL THERAPY | Age: 77
Discharge: HOME | End: 2022-05-11
Attending: PHYSICIAN ASSISTANT
Payer: MEDICARE

## 2022-05-11 PROCEDURE — 97112 NEUROMUSCULAR REEDUCATION: CPT

## 2022-05-11 PROCEDURE — 97110 THERAPEUTIC EXERCISES: CPT

## 2022-05-11 NOTE — PROGRESS NOTES
Angelita Mills  : 1945  [place]  Secondary:  [address] at [place] [id number] [facility], [address] [place] [address],[address], [facility] [address]  [phone]   [phone]         OUTPATIENT PHYSICAL THERAPY: Daily Treatment Note 2022  Visit Count:  9    ICD-10: Treatment Diagnosis: other abnormalities of gait and mobility R26.89; Dizziness and giddiness R42  Precautions/Allergies:   Latex, Articaine, Clindamycin, Donepezil, Epinephrine, Erythromycin, Keflex [cephalexin], Penicillin g, Penicillins, Rivastigmine, Tramadol, Chlorzoxazone, Gabapentin, and Tetracyclines   TREATMENT PLAN:  Effective Dates: 3/9/2022 TO 2022 (90 days). Frequency/Duration: 1-2 times a week for 60- 90 Day(s)    Pre-treatment Symptoms/Complaints:  Falls, dizziness; \"Doing okay. Went to [employer] and walked too much so R hip is sore. Stopped by [facility] and got information. \"  Pain: Initial: Pain Intensity 1: 0 2/10 R hip Post Session:   2/10 R hip   Medications Last Reviewed:  2022  Updated Objective Findings:  None Today  TREATMENT:     NEUROMUSCULAR RE-EDUCATION: (25 minutes):  Exercise/activities per grid below to improve balance, coordination, kinesthetic sense, posture and proprioception. Required minimal verbal cues to promote static and dynamic balance in standing. Activity   Date   22   Activity/Exercise      Walking with head turns        Walking with head up & down        Step overs        Step taps     6 inch step x 20 reps fwd and cross   Marching   4 laps in real   Sidestepping      Crossovers      [place]      Walking  backwards        Tandem walking      Weaving in/out of cones        Picking up cones        Sports cord        [employer] ball      Figure 8s       Circles right/left      Walking with 360 degree turns      Spirals      Weight shifting:    Left & Right        Weight shifting:   Forward & Backward         Static Standing Balance Standing with feet apart     Blue foams eyes open and closed   Standing with feet together        Standing with feet semitandem   Blue foams eyes open and closed   Standing with feet tandem      Single leg stance      X1/X2 Viewing exercises        Hallpike-Amna testing for BPPV (Benign Paroxysmal Positional Vertigo)        Young-Daroff exercises      Canalith Repositioning treatment/Epley Maneuver  for BPPV (Benign Paroxysmal Positional Vertigo)      Smart Equitest Training: See scanned report. Therapeutic Exercise: ( 15 minutes):  Exercises per grid below to improve mobility, strength and balance. Required minimal verbal cues to promote proper body alignment, promote proper body posture and promote proper body mechanics. Progressed resistance, repetitions and complexity of movement as indicated. Date   5/11/22   Activity/Exercise    Step ups 4 inch step x 10 reps each leg with rail   Sit to stand X 10 reps from chair with no UE assist   nustep Level 1 x 8 minutes                         [employer] Portal  Treatment/Session Summary:    · Response to Treatment:  Patient tolerated session well. Decreased resistance on nustep today due to R hip being sore from walking. Patient is looking into activities at [employer]. · Equipment provided today:  None today  · Recommendations/Intent for next treatment session: Next visit will focus on balance training, safety with mobility.     Total Treatment Billable Duration: 40 minutes  PT Patient Time In/Time Out  Time In: 1300  Time Out: [address] [place], PT     Visit # Therapist initials Date Arrived NS/ Cx < 24 hr >24 hr Cx Visit Comments   1 MM 3/9/22 X   Initial Assessment Only Today  Medicare   2 MM 3/16/22 X      3 MM 3/23/22 X      4 MM 3/31/22 X      X MM 4/7/22  cx     5 MM 4/14/22 X      6 MM 4/20/22 X      7 MM 4/27/22 X   PN   8 MM 5/5/22 X      9 MM 5/11/22 X Abbreviations: NS = No Show; CX = cancelled        No future appointments.

## 2022-06-02 ENCOUNTER — TELEPHONE (OUTPATIENT)
Dept: GYNECOLOGY | Age: 77
End: 2022-06-02

## 2022-06-02 NOTE — TELEPHONE ENCOUNTER
Patient calling to let us know she had been ill and missed her mammogram appt at [facility] on June 1. Advised her she would need to call them to reschedule.

## 2022-06-15 ENCOUNTER — TELEPHONE (OUTPATIENT)
Dept: FAMILY MEDICINE CLINIC | Facility: CLINIC | Age: 77
End: 2022-06-15

## 2022-06-15 ENCOUNTER — HOSPITAL ENCOUNTER (OUTPATIENT)
Dept: PHYSICAL THERAPY | Age: 77
Setting detail: RECURRING SERIES
End: 2022-06-15
Payer: MEDICARE

## 2022-06-15 NOTE — TELEPHONE ENCOUNTER
Patient called wondering when you would recommend another thyroid US. She doesn't seem to be having any problems except that she was very hoarse for about 3 weeks. Possibly allergy related.  Would just like to know what you suggest.

## 2022-06-15 NOTE — PROGRESS NOTES
Sanjeev Sim  : 1945  Primary: Medicare Part A And B  Secondary:  Otilia Vidal  [facility] [address]  [address] [address]  Phone: [phone]  Fax: [phone]    PT Visit Info:    Canceled Appointment: 2 (6/15/22)     OT Visit Info:  No data recorded    OUTPATIENT THERAPY:OP NOTE TYPE: Progress Report 6/15/2022               Episode  Appt Desk           Sanjeev Sim cancelled her appointment for today due to transportation issues. Will plan to follow up next during next appointment.   Thank you,  Kaitlin Garcia, PT    Future Appointments   Date Time Provider Dina Keenan   6/15/2022  7:00 PM Elena Haddad PT [facility] PRINCESS   2022 11[facility]   2022  3:00 PM Bernie Morataya MD [facility] AMB

## 2022-06-15 NOTE — TELEPHONE ENCOUNTER
I believe she had biopsies or other testing done elsewhere. Please make her an appointment to discuss with me or another provider here in the office.

## 2022-06-23 ENCOUNTER — OFFICE VISIT (OUTPATIENT)
Dept: FAMILY MEDICINE CLINIC | Facility: CLINIC | Age: 77
End: 2022-06-23
Payer: MEDICARE

## 2022-06-23 VITALS
SYSTOLIC BLOOD PRESSURE: 148 MMHG | RESPIRATION RATE: 12 BRPM | OXYGEN SATURATION: 100 % | WEIGHT: 131 LBS | TEMPERATURE: 98 F | HEART RATE: 98 BPM | DIASTOLIC BLOOD PRESSURE: 80 MMHG | BODY MASS INDEX: 23.21 KG/M2 | HEIGHT: 63 IN

## 2022-06-23 DIAGNOSIS — E04.2 MULTINODULAR GOITER: Primary | ICD-10-CM

## 2022-06-23 PROCEDURE — 1090F PRES/ABSN URINE INCON ASSESS: CPT | Performed by: FAMILY MEDICINE

## 2022-06-23 PROCEDURE — 1036F TOBACCO NON-USER: CPT | Performed by: FAMILY MEDICINE

## 2022-06-23 PROCEDURE — 99214 OFFICE O/P EST MOD 30 MIN: CPT | Performed by: FAMILY MEDICINE

## 2022-06-23 PROCEDURE — G8400 PT W/DXA NO RESULTS DOC: HCPCS | Performed by: FAMILY MEDICINE

## 2022-06-23 PROCEDURE — 3017F COLORECTAL CA SCREEN DOC REV: CPT | Performed by: FAMILY MEDICINE

## 2022-06-23 PROCEDURE — 1123F ACP DISCUSS/DSCN MKR DOCD: CPT | Performed by: FAMILY MEDICINE

## 2022-06-23 PROCEDURE — G8420 CALC BMI NORM PARAMETERS: HCPCS | Performed by: FAMILY MEDICINE

## 2022-06-23 PROCEDURE — G8427 DOCREV CUR MEDS BY ELIG CLIN: HCPCS | Performed by: FAMILY MEDICINE

## 2022-06-23 RX ORDER — POLYETHYLENE GLYCOL 1000
POWDER (GRAM) MISCELLANEOUS
COMMUNITY

## 2022-06-23 RX ORDER — ADENOSINE TRIPHOS DISOD TRIHYD
1 POWDER (GRAM) MISCELLANEOUS
COMMUNITY
End: 2022-10-06

## 2022-06-23 ASSESSMENT — PATIENT HEALTH QUESTIONNAIRE - PHQ9
SUM OF ALL RESPONSES TO PHQ9 QUESTIONS 1 & 2: 0
SUM OF ALL RESPONSES TO PHQ QUESTIONS 1-9: 0
2. FEELING DOWN, DEPRESSED OR HOPELESS: 0
SUM OF ALL RESPONSES TO PHQ QUESTIONS 1-9: 0
1. LITTLE INTEREST OR PLEASURE IN DOING THINGS: 0

## 2022-06-23 ASSESSMENT — ANXIETY QUESTIONNAIRES
4. TROUBLE RELAXING: 1
GAD7 TOTAL SCORE: 7
1. FEELING NERVOUS, ANXIOUS, OR ON EDGE: 1
3. WORRYING TOO MUCH ABOUT DIFFERENT THINGS: 1
7. FEELING AFRAID AS IF SOMETHING AWFUL MIGHT HAPPEN: 1
IF YOU CHECKED OFF ANY PROBLEMS ON THIS QUESTIONNAIRE, HOW DIFFICULT HAVE THESE PROBLEMS MADE IT FOR YOU TO DO YOUR WORK, TAKE CARE OF THINGS AT HOME, OR GET ALONG WITH OTHER PEOPLE: SOMEWHAT DIFFICULT
6. BECOMING EASILY ANNOYED OR IRRITABLE: 1
5. BEING SO RESTLESS THAT IT IS HARD TO SIT STILL: 1
2. NOT BEING ABLE TO STOP OR CONTROL WORRYING: 1

## 2022-06-23 NOTE — PROGRESS NOTES
Kinga Lorenz (: 1945) is a 78 y.o. female, established patient, here for evaluation of the following chief complaint(s):  Thyroid Problem (Goiter, Discuss having Thyroid US to Dx)       ASSESSMENT/PLAN:  1. Multinodular goiter  -     US HEAD NECK SOFT TISSUE THYROID; Future  Will repeat ultrasound. Reviewed ultrasound done from 2021. Cannot find any results from the biopsy that was done, patient will try and find records. Otherwise we will check ultrasound in the meantime. SUBJECTIVE/OBJECTIVE:  HPI     Patient presents to discuss ultrasound of thyroid. She had 1 done 1 year ago which recommended biopsies. She is pretty sure she got the biopsies done but she is not sure where and what the results were. She would like to have another ultrasound done this year. She is a very poor historian. She said she was sick back in May due to the pollen and then developed a sinus infection. She says she spoke to her ENT Dr. Saba Arroyo at that time. Denies any other new symptoms. Review of Systems    Physical Exam  Vitals reviewed. Constitutional:       General: She is not in acute distress. Appearance: Normal appearance. She is not ill-appearing or toxic-appearing. HENT:      Head: Normocephalic and atraumatic. Eyes:      General: No scleral icterus. Right eye: No discharge. Left eye: No discharge. Conjunctiva/sclera: Conjunctivae normal.   Cardiovascular:      Rate and Rhythm: Normal rate and regular rhythm. Heart sounds: Normal heart sounds. No murmur heard. No friction rub. No gallop. Pulmonary:      Effort: Pulmonary effort is normal. No respiratory distress. Breath sounds: Normal breath sounds. No stridor. No wheezing, rhonchi or rales. Musculoskeletal:         General: No swelling. Normal range of motion. Skin:     General: Skin is warm. Findings: No rash. Neurological:      General: No focal deficit present.       Mental Status: She is alert and oriented to person, place, and time. Psychiatric:         Mood and Affect: Mood normal.         Behavior: Behavior normal.           On this date 06/23/22  I have spent 30 minutes reviewing previous notes, test results and face to face with the patient discussing the diagnosis and importance of compliance with the treatment plan as well as documenting on the day of the visit. An electronic signature was used to authenticate this note.

## 2022-06-24 ENCOUNTER — HOSPITAL ENCOUNTER (OUTPATIENT)
Dept: PHYSICAL THERAPY | Age: 77
Setting detail: RECURRING SERIES
Discharge: HOME | End: 2022-06-27
Payer: MEDICARE

## 2022-06-24 PROCEDURE — 97110 THERAPEUTIC EXERCISES: CPT

## 2022-06-24 PROCEDURE — 97112 NEUROMUSCULAR REEDUCATION: CPT

## 2022-06-24 ASSESSMENT — PAIN SCALES - GENERAL
PAINLEVEL_OUTOF10: 0
PAINLEVEL_OUTOF10: 0

## 2022-06-24 NOTE — PROGRESS NOTES
Neeru Epps  : 1945  Primary:   Secondary:  [address] [facility]  [place] [address]  Phone: [phone]  Fax: [phone] Plan Frequency: 1 time per week or every other week as needed for 60 days    Plan of Care/Certification Expiration Date: 22      PT Visit SMED::[id number]}  Total # of Visits to Date: 8  Canceled Appointment: 2 (6/15/22)         OUTPATIENT PHYSICAL THERAPY:OP NOTE TYPE: Treatment Note 2022       Episode  }Appt Desk              Treatment Diagnosis:  other abnormalities of gait and mobility R26.89; Dizziness and giddiness R42  Medical/Referring Diagnosis:  No admission diagnoses are documented for this encounter. Benign paroxysmal vertigo, left ear [H81.12]   Referring Physician:  No ref. provider found Maya Solano MD Orders:  PT Eval and Treat   Date of Onset:  No data recorded   Allergies:   Latex, Articaine, Donepezil, Epinephrine, Erythromycin, Penicillins, Cephalexin, Chlorzoxazone, Clindamycin, Gabapentin, and Tramadol  Restrictions/Precautions:  No data recordedNo data recorded   Interventions Planned (Treatment may consist of any combination of the following):    Current Treatment Recommendations: Strengthening; Balance training; Functional mobility training; Transfer training; Endurance training; Gait training; Neuromuscular re-education; Home exercise program; Safety education & training; Patient/Caregiver education & training; Vestibular rehab; Therapeutic activities     Subjective Comments:  Doing okay. still losing my balance. Had to cancel last appt so back today. Doing better but feel unsteady on inclines. Initial:}    0/10Post Session:       0/10  Medications Last Reviewed:  2022  Updated Objective Findings:  see recert  Treatment   THERAPEUTIC EXERCISE: (25 minutes):    Exercises per grid below to improve mobility, strength and balance.   Required minimal verbal cues to promote proper body alignment, promote proper body posture and promote proper body mechanics. Progressed resistance, repetitions and complexity of movement as indicated.      Date  6/24/22   Activity/Exercise     Step ups 4 inch step x 10 reps each leg with rail   Sit to stand X 10 reps from chair with no UE assist   nustep Level 2 x 8 minutes                                    NEUROMUSCULAR RE-EDUCATION: (20 minutes):    Exercise/activities per grid below to improve balance, coordination, kinesthetic sense, posture and proprioception. Required minimal verbal cues to promote static and dynamic balance in standing. Activity    Date  6/24/22   Activity/Exercise        Walking with head turns          Walking with head up & down          Step overs           Step taps       6 inch step x 20 reps fwd and cross   Marching    4 laps in au   Sidestepping        Crossovers        [place]        Walking  backwards          Tandem walking        Weaving in/out of cones       4 laps   Picking up cones         Walking up/down ramps        4 laps   Ball toss        Kick ball        Figure 8s         Circles right/left        Walking with 360 degree turns        Spirals        Weight shifting:    Left & Right           Weight shifting:   Forward & Backward            Static Standing Balance           Standing with feet apart       Blue foams eyes open and closed   Standing with feet together           Standing with feet semitandem    Blue foams eyes open and closed   Standing with feet tandem        Single leg stance        X1/X2 Viewing exercises           Hallpike-Felecia testing for BPPV (Benign Paroxysmal Positional Vertigo)           Mitchell-Gregory exercises        Canalith Repositioning treatment/Epley Maneuver  for BPPV (Benign Paroxysmal Positional Vertigo)        Smart Equitest Training: See scanned report.                   Treatment/Session Summary:    · Treatment Assessment:  Patient demonstrated better mobility and balance today. Discussed again patient's need to research where she wants to continue to exercise. She prefers pool exercises. Have discussed options with patient. · Communication/Consultation:  None today  · Equipment provided today:  None  · Recommendations/Intent for next treatment session: Next visit will focus on balance training, strengthening.     Total Treatment Billable Duration:  45 minutes   Time In: 1100  Time Out: [address] RODRIGUEZ, PT       Charge Capture  }Post Session Pain  PT Visit Info  Labmeeting Portal  MD Guidelines  Scanned Media  Benefits  MyChart    Future Appointments   Date Time Provider Dina Keenan   6/30/2022  3:30 PM [facility]   8/4/2022  2:30 PM Amanda Butt MD [facility] AMB

## 2022-06-24 NOTE — THERAPY RECERTIFICATION
Pursuit:                              [x]? Smooth Eye Movements                            []?Delayed Eye Movements                             []?Within Normal Limits                             []?Other(comment): · Voluntary Saccades:                              [x]? Smooth Eye Movements                            []?Delayed Eye Movements                             []?Within Normal Limits                             []?Other(comment): · Optokinetic cloth: NT  Vestibular Ocular Reflex Testing:  · Dynamic Visual Acuity Test: NT  · Head Thrust Test: Negative to the right. Negative to the left. · VOR Cancellation: WNL  Position Tests:  · Hallpike-Felecia testing presented as negative indicating that Benign Paroxysmal Positional Vertigo (BPPV) is absent bilaterally. ASSESSMENT   RECERTIFICATION NOTE:  Ms. Radha Del Valle has attended 10 PT sessions from 3/9/22 to 6/24/22 for history of recent fall, hitting the back of her head, and dizziness. Patient had scheduled to come back in late May and early June but had to cancel. She returns today after not being here since 5/11/22. Patient demonstrates improved balance and mobility today. Patient still has some concerns with balance. We had previously had discussions about patient finding a place to do pool exercises as she had done that in the past and patient has an interest in pursuing that again as a way to keep improving strength and mobility. Patient has not gotten very far in researching places to do water exercises, but she knows the places that offer water exercises. Discussed with patient that she is doing better with her mobility, and she agrees. Reinforced to patient that it is important for her to find a way/place to continue to exercise to maintain her strength and mobility. Patient agrees. Plan to follow up with patient for the next month or two as needed as she is working on finding a place where she will continue her exercises on her own.   .  Patient would benefit from continuing PT to address these problems to improve patient's independence and safety with mobility and daily activities. Thank you. Problem List: (Impacting functional limitations): Body Structures, Functions, Activity Limitations Requiring Skilled Therapeutic Intervention: Decreased functional mobility ; Decreased strength; Decreased safe awareness; Decreased endurance; Decreased balance; Decreased posture     Therapy Prognosis:   Therapy Prognosis: Good     Assessment Complexity: medium complexity  Medium Complexity  PLAN   Effective Dates: 6/24/22 TO Plan of Care/Certification Expiration Date: 08/23/22     Frequency/Duration: Plan Frequency: 1 time per week or every other week as needed for 60 days     Interventions Planned (Treatment may consist of any combination of the following):    Current Treatment Recommendations: Strengthening; Balance training; Functional mobility training; Transfer training; Endurance training; Gait training; Neuromuscular re-education; Home exercise program; Safety education & training; Patient/Caregiver education & training; Vestibular rehab; Therapeutic activities     Goals: (Goals have been discussed and agreed upon with patient.)  GOALS: (Goals have been discussed and agreed upon with patient.)  Short-Term Functional Goals: Time Frame: 2-4 weeks  1. Patient will demonstrate independence and compliance with home exercise program to improve balance and dizziness for daily activities. MET  2. Patient will increase her score on the Reilly Balance Scale to greater than or equal to 43/56 indicating improved safety and decreased fall risk for daily activities. MET  3.    Discharge Goals: Time Frame: 8-12 weeks  1. Patient will report decreased dizziness to less than or equal to 1-2/10 with daily activities to improve safety and quality of life. Progressing and ongoing  2.  Patient will increase her score on the dynamic gait index to greater than or equal to 18/24 indicating improved balance and safety for community ambulation. Progressing and ongoing  3. Patient will increase her score on the Reilly Balance Scale to greater than or equal to 48/56 indicating improved safety and decreased fall risk for daily activities. Progressing and ongoing  4. Patient will ambulate with least assistive device over level and unlevel surfaces without evidence of imbalance to improve safety for daily activities. Progressing and ongoing         Outcome Measure: Tool Used: Reilly Balance Scale  Score:  Initial: 40/56 Most Recent: 46/56 (Date: 6/24/22 )   Interpretation of Score: Each section is scored on a 0-4 scale, 0 representing the patients inability to perform the task and 4 representing independence.  The scores of each section are added together for a total score of 56.  The higher the patients score, the more independent the patient is. Any score below 45 indicates increased risk for falls.        Tool Used: Dynamic Gait Index  Score:  Initial: 14/24 Most Recent: 17/24 (Date: 6/24/22 )   Interpretation of Score: Each section is scored on a 0-3 scale, 0 representing the patients inability to perform the task and 3 representing independence.  The scores of each section are added together for a total score of 24.  Any score below 19 indicates increased risk for falls.           Medical Necessity:   · Patient is expected to demonstrate progress in strength, balance and functional technique to improve safety during daily activities. Reason For Services/Other Comments:  · Patient continues to demonstrate capacity to improve dizziness, balance, gait which will increase independence and increase safety. Total Duration:  Time In: 1100  Time Out: 1145    Regarding Meredith Toribio's therapy, I certify that the treatment plan above will be carried out by a therapist or under their direction. Thank you for this referral,  Austin Gamble PT     Referring Physician Signature: No ref.  provider Adolfo Fields* _______________________________ Date _____________        Post Session Pain  Charge Capture  PT Visit Info  POC Link  Treatment Note Link  MD Guidelines  Sunitha

## 2022-06-30 ENCOUNTER — HOSPITAL ENCOUNTER (OUTPATIENT)
Dept: ULTRASOUND IMAGING | Age: 77
Discharge: HOME | End: 2022-07-03
Payer: MEDICARE

## 2022-06-30 DIAGNOSIS — R93.89 ABNORMAL THYROID ULTRASOUND: Primary | ICD-10-CM

## 2022-06-30 DIAGNOSIS — E04.2 MULTINODULAR GOITER: ICD-10-CM

## 2022-06-30 PROCEDURE — 76536 US EXAM OF HEAD AND NECK: CPT

## 2022-07-18 ENCOUNTER — TELEPHONE (OUTPATIENT)
Dept: FAMILY MEDICINE CLINIC | Facility: CLINIC | Age: 77
End: 2022-07-18

## 2022-08-15 NOTE — THERAPY DISCHARGE
Sushila Libra  : 1945  Primary:   Secondary:  Natalie Boudreaux 83  [address] [address]  Phone: [phone]  Fax: [phone] Plan Frequency: 1 time per week or every other week as needed for 60 days    Plan of Care/Certification Expiration Date: 22      PT Visit Info: Total # of Visits to Date: 8  Canceled Appointment: 3 (22)      OUTPATIENT PHYSICAL THERAPY:OP NOTE TYPE: Discharge Summary 2022               Episode  Appt Desk         Treatment Diagnosis: other abnormalities of gait and mobility R26.89; Dizziness and giddiness R42  * No diagnoses found *  Medical/Referring Diagnosis:  No admission diagnoses are documented for this encounter. Benign paroxysmal vertigo, left ear [H81.12]   Referring Physician:  No ref. provider found Alli Barlow MD Orders:  PT Eval and Treat   Return MD Appt:    Date of Onset:  No data recorded 22  Allergies:  Latex, Articaine, Donepezil, Epinephrine, Erythromycin, Penicillins, Cephalexin, Chlorzoxazone, Clindamycin, Gabapentin, and Tramadol  Restrictions/Precautions:    No data recordedNo data recorded   Medications Last Reviewed:  2022     SUBJECTIVE   History of Injury/Illness (Reason for Referral):  Brother brought her to PT, and spoke to PT privately to discuss patient's primary reason for being here, dizziness and fall. No falls since, but near falls. Brother says patient is mentally ill and is not sure what patient would say about what happened. Patient has no c/o dizziness lately. Patient is a poor historian. Pt says R leg gives her problems with balance. Recently on CPAP.  24 fell and hit back of head. Dizzy since day before. Was prescribed antivert at [facility] in [facility]. Patient Stated Goal(s):   \"To balance better\"  Initial:     0/10 Post Session:     0/10  Past Medical History/Comorbidities:   Ms. Candi Garcia  has a past medical history of Multinodular perico. Ms. Raffi Henriquez  has a past surgical history that includes heent; Breast biopsy (6/14/2013); Breast lumpectomy (6/14/2013); other surgical history; heent; heent; gyn; and Cholecystectomy. Social History/Living Environment:   No data recorded  lives alone, first floor. Drives but doesn't have a car so brother takes her places. No AD  Prior Level of Function/Work/Activity:   independent  No data recordedNo data recordedNo data recorded   Learning:   Does the patient/guardian have any barriers to learning?: No barriers  Will there be a co-learner?: No  What is the preferred language of the patient/guardian?: English  Is an  required?: No  How does the patient/guardian prefer to learn new concepts?: Listening; Reading; Demonstration; Pictures/Videos     Fall Risk Scale: Total Score: 15  Aviles Fall Risk: Low (0-24)     Personal Factors:        Sex:  female        Age:  78 y.o. Left handed      OBJECTIVE   Observation/Orthostatic Postural Assessment:          Fwd head posture  Strength:         LE STRENGTH:  4/5 L 4-/5R hip flexion, 4/5 hip abduction, 4/5 hip adduction, 4/5 hip extension, 4/5L 4-/5R knee extension, 4/5 knee flexion, 4/5 ankle dorsiflexion, 4/5 ankle plantarflexion        Functional Mobility:         Gait/Ambulation:  Patient ambulates with no AD, demonstrating decreased step length and heel strike B, slow pace, mild path deviations.         Transfers:  independent        Bed Mobility:  independent        Stairs:  With rail  Sensation:         intact  Postural Control & Balance:  Reilly Balance Scale:  40/56.   (A score less than 45/56 indicates high risk of falls)     Dynamic Gait Index:  14/24.   (A score less than or equal to19/24 is abnormal and predictive of falls)           Oculomotor Exam:  Eye Range of Motion:  full range of motion  Cervical Range of Motion:   diminished range of motion  Spontaneous nystagmus:  NO  Gaze holding nystagmus:  NO  Skew Deviation:  none  Smooth Pursuit: [x]Smooth Eye Movements                            []Delayed Eye Movements                             []Within Normal Limits                             []Other(comment):   Voluntary Saccades:                              [x]Smooth Eye Movements                            []Delayed Eye Movements                             []Within Normal Limits                             []Other(comment):   Optokinetic cloth: NT  Vestibular Ocular Reflex Testing:  Dynamic Visual Acuity Test: NT  Head Thrust Test: Negative to the right. Negative to the left. VOR Cancellation: WNL  Position Tests:  Hallpike-Felecia testing presented as negative indicating that Benign Paroxysmal Positional Vertigo (BPPV) is absent bilaterally. ASSESSMENT   DISCHARGE NOTE 8/15/22:  Ms. Radha Del Valle has attended 10 PT sessions from 3/9/22 to 6/24/22 for history of recent fall, hitting the back of her head, and dizziness. Patient demonstrates improved balance and mobility today. Patient was doing well with exercises but had difficulty with transportation getting to PT. We had previously had discussions about patient finding a place to do pool exercises as she had done that in the past and patient has an interest in pursuing that again as a way to keep improving strength and mobility. Talked to patient on the phone today as she was still planning to call places about doing exercises in the pool but was trying to work out transportation. Discussed that we would discharge patient from PT at this time but would be available if she needed to call with questions. Thank you. Problem List: (Impacting functional limitations): Body Structures, Functions, Activity Limitations Requiring Skilled Therapeutic Intervention: Decreased functional mobility ;  Decreased strength; Decreased safe awareness; Decreased endurance; Decreased balance; Decreased posture     Therapy Prognosis:   Therapy Prognosis: Good     Assessment Complexity: medium complexity  Medium Complexity  PLAN   Effective Dates: 6/24/22 TO Plan of Care/Certification Expiration Date: 08/23/22     Frequency/Duration: Plan Frequency: 1 time per week or every other week as needed for 60 days     Interventions Planned (Treatment may consist of any combination of the following):    Current Treatment Recommendations: Strengthening; Balance training; Functional mobility training; Transfer training; Endurance training; Gait training; Neuromuscular re-education; Home exercise program; Safety education & training; Patient/Caregiver education & training; Vestibular rehab; Therapeutic activities     Goals: (Goals have been discussed and agreed upon with patient.)  GOALS: (Goals have been discussed and agreed upon with patient.)  Short-Term Functional Goals: Time Frame: 2-4 weeks  Patient will demonstrate independence and compliance with home exercise program to improve balance and dizziness for daily activities. MET  Patient will increase her score on the Reilly Balance Scale to greater than or equal to 43/56 indicating improved safety and decreased fall risk for daily activities. MET     Discharge Goals: Time Frame: 8-12 weeks  Patient will report decreased dizziness to less than or equal to 1-2/10 with daily activities to improve safety and quality of life. unable to reassess  Patient will increase her score on the dynamic gait index to greater than or equal to 18/24 indicating improved balance and safety for community ambulation. unable to reassess  Patient will increase her score on the Reilly Balance Scale to greater than or equal to 48/56 indicating improved safety and decreased fall risk for daily activities. unable to reassess  Patient will ambulate with least assistive device over level and unlevel surfaces without evidence of imbalance to improve safety for daily activities. unable to reassess         Outcome Measure:    Tool Used: Reilly Balance Scale  Score:  Initial: 40/56 Most Recent: 46/56 (Date: 6/24/22 )   Interpretation of Score: Each section is scored on a 0-4 scale, 0 representing the patients inability to perform the task and 4 representing independence. The scores of each section are added together for a total score of 56. The higher the patients score, the more independent the patient is. Any score below 45 indicates increased risk for falls. Tool Used: Dynamic Gait Index  Score:  Initial: 14/24 Most Recent: 17/24 (Date: 6/24/22 )   Interpretation of Score: Each section is scored on a 0-3 scale, 0 representing the patients inability to perform the task and 3 representing independence. The scores of each section are added together for a total score of 24. Any score below 19 indicates increased risk for falls. Medical Necessity:   Patient is expected to demonstrate progress in strength, balance and functional technique to improve safety during daily activities. Reason For Services/Other Comments:  Patient continues to demonstrate capacity to improve dizziness, balance, gait which will increase independence and increase safety.          Post Session Pain  Charge Capture  PT Visit Info  POC Link  Treatment Note Link  MD Guidelines  Sunitha

## 2022-08-17 ENCOUNTER — OFFICE VISIT (OUTPATIENT)
Dept: FAMILY MEDICINE CLINIC | Facility: CLINIC | Age: 77
End: 2022-08-17
Payer: MEDICARE

## 2022-08-17 VITALS
OXYGEN SATURATION: 97 % | HEART RATE: 90 BPM | TEMPERATURE: 98.9 F | DIASTOLIC BLOOD PRESSURE: 68 MMHG | SYSTOLIC BLOOD PRESSURE: 122 MMHG | WEIGHT: 131.4 LBS | BODY MASS INDEX: 23.28 KG/M2

## 2022-08-17 DIAGNOSIS — Z00.00 MEDICARE ANNUAL WELLNESS VISIT, SUBSEQUENT: Primary | ICD-10-CM

## 2022-08-17 PROBLEM — F11.10 NARCOTIC ABUSE (HCC): Status: RESOLVED | Noted: 2021-05-27 | Resolved: 2022-08-17

## 2022-08-17 PROCEDURE — 3017F COLORECTAL CA SCREEN DOC REV: CPT | Performed by: FAMILY MEDICINE

## 2022-08-17 PROCEDURE — G0439 PPPS, SUBSEQ VISIT: HCPCS | Performed by: FAMILY MEDICINE

## 2022-08-17 PROCEDURE — 1123F ACP DISCUSS/DSCN MKR DOCD: CPT | Performed by: FAMILY MEDICINE

## 2022-08-17 ASSESSMENT — LIFESTYLE VARIABLES
HOW MANY STANDARD DRINKS CONTAINING ALCOHOL DO YOU HAVE ON A TYPICAL DAY: PATIENT DOES NOT DRINK
HOW OFTEN DO YOU HAVE A DRINK CONTAINING ALCOHOL: NEVER

## 2022-08-17 NOTE — PATIENT INSTRUCTIONS
Personalized Preventive Plan for Ashleigh Carl - 8/17/2022  Medicare offers a range of preventive health benefits. Some of the tests and screenings are paid in full while other may be subject to a deductible, co-insurance, and/or copay. Some of these benefits include a comprehensive review of your medical history including lifestyle, illnesses that may run in your family, and various assessments and screenings as appropriate. After reviewing your medical record and screening and assessments performed today your provider may have ordered immunizations, labs, imaging, and/or referrals for you. A list of these orders (if applicable) as well as your Preventive Care list are included within your After Visit Summary for your review. Other Preventive Recommendations:    A preventive eye exam performed by an eye specialist is recommended every 1-2 years to screen for glaucoma; cataracts, macular degeneration, and other eye disorders. A preventive dental visit is recommended every 6 months. Try to get at least 150 minutes of exercise per week or 10,000 steps per day on a pedometer . Order or download the FREE \"Exercise & Physical Activity: Your Everyday Guide\" from [employer]. Call 4-362.364.9063 or search [employer] online. You need 4762-1641 mg of calcium and 1600-2811 IU of vitamin D per day. It is possible to meet your calcium requirement with diet alone, but a vitamin D supplement is usually necessary to meet this goal.  When exposed to the sun, use a sunscreen that protects against both UVA and UVB radiation with an SPF of 30 or greater. Reapply every 2 to 3 hours or after sweating, drying off with a towel, or swimming. Always wear a seat belt when traveling in a car. Always wear a helmet when riding a bicycle or motorcycle.

## 2022-08-17 NOTE — PROGRESS NOTES
Medicare Annual Wellness Visit    [facility] is here for Medicare AWV (Bilateral hip pain. )    Assessment & Plan   Medicare annual wellness visit, subsequent    Recommendations for Preventive Services Due: see orders and patient instructions/AVS.  Recommended screening schedule for the next 5-10 years is provided to the patient in written form: see Patient Instructions/AVS.    Declined colon cancer screening. Has paperwork that needs to be filled out for [facility]. Return in 2 weeks (on 8/31/2022) for follow up paperwork . Subjective   Overall doing well - had recent hip injections, following with ortho. Patient's complete Health Risk Assessment and screening values have been reviewed and are found in Flowsheets. The following problems were reviewed today and where indicated follow up appointments were made and/or referrals ordered. Wants her ears checked today for wax.      Positive Risk Factor Screenings with Interventions:    Fall Risk:  Do you feel unsteady or are you worried about falling? : (!) yes  2 or more falls in past year?: (!) yes  Fall with injury in past year?: (!) yes   Fall Risk Interventions:    Home safety tips provided  Talking to PT to start water aerobics soon            General Health and ACP:  General  In general, how would you say your health is?: Very Good  In the past 7 days, have you experienced any of the following: New or Increased Pain, New or Increased Fatigue, Loneliness, Social Isolation, Stress or Anger?: (!) Yes  Select all that apply: (!) New or Increased Pain  Do you get the social and emotional support that you need?: Yes  Do you have a Living Will?: Yes    Advance Directives       Power of  Living Will ACP-Advance Directive ACP-Power of     Not on File Not on File Not on File Not on File        General Health Risk Interventions:  Pain issues: seeing ortho for hip pain     Hearing/Vision:  Do you or your family notice any trouble with your hearing that hasn't been managed with hearing aids?: No  Do you have difficulty driving, watching TV, or doing any of your daily activities because of your eyesight?: (!) Yes  Have you had an eye exam within the past year?: (!) No  No results found. Hearing/Vision Interventions:  Vision concerns:  patient encouraged to make appointment with his/her eye specialist    Safety:  Do you have working smoke detectors?: Yes  Do you have any tripping hazards - loose or unsecured carpets or rugs?: (!) Yes  Do you have any tripping hazards - clutter in doorways, halls, or stairs?: No  Do you have either shower bars, grab bars, non-slip mats or non-slip surfaces in your shower or bathtub?: (!) No  Do all of your stairways have a railing or banister?: (!) No  Do you always fasten your seatbelt when you are in a car?: Yes  Safety Interventions:  Home safety tips provided    ADLs:  In the past 7 days, did you need help from others to perform any of the following everyday activities: Eating, dressing, grooming, bathing, toileting, or walking/balance?: (!) Yes  Select all that apply: (!) Bathing, Dressing  In the past 7 days, did you need help from others to take care of any of the following: Laundry, housekeeping, banking/finances, shopping, telephone use, food preparation, transportation, or taking medications?: (!) Yes  Select all that apply: (!) Food Preparation, Laundry, Housekeeping, Shopping  ADL Interventions:  Has help at home - working with PT for balance           Objective   Vitals:    08/17/22 1355   BP: 122/68   Pulse: 90   Temp: 98.9 °F (37.2 °C)   SpO2: 97%   Weight: 131 lb 6.4 oz (59.6 kg)      Body mass index is 23.28 kg/m².       General Appearance: alert , well developed and well- nourished, in no acute distress  Skin: warm and dry, no rash or erythema  Head: normocephalic and atraumatic  Eyes: pupils equal, round, and reactive to light, extraocular eye movements intact, conjunctivae normal  ENT: tympanic Historical Provider, MD   Polyethylene Glycol 1000 LIQD Take by mouth Yes Historical Provider, MD   Pregnenolone 50 MG TABS 200 mg by Other route every morning Yes Historical Provider, MD   Zinc Acetate, Oral, (ZINC ACETATE PO) Take by mouth Yes Ar Automatic Reconciliation   albuterol sulfate  (90 Base) MCG/ACT inhaler Inhale 1 puff into the lungs every 4 hours as needed Yes Ar Automatic Reconciliation   vitamin D (CHOLECALCIFEROL) 25 MCG (1000 UT) TABS tablet Take by mouth daily Yes Ar Automatic Reconciliation   cyanocobalamin 500 MCG tablet Take 500 mcg by mouth daily Yes Ar Automatic Reconciliation   diphenhydrAMINE (BENADRYL) 25 MG capsule Take 25 mg by mouth every 6 hours as needed Yes Ar Automatic Reconciliation   fexofenadine (ALLEGRA) 180 MG tablet Take by mouth daily Yes Ar Automatic Reconciliation   fluticasone (FLONASE) 50 MCG/ACT nasal spray SPRAY 2 SPRAYS INTO EACH NOSTRIL EVERY DAY Yes Ar Automatic Reconciliation   ketoconazole (NIZORAL) 2 % shampoo Use PRN Yes Ar Automatic Reconciliation   melatonin 3 MG TABS tablet Take by mouth Yes Ar Automatic Reconciliation   meloxicam (MOBIC) 7.5 MG tablet Take 7.5 mg by mouth 2 times daily Yes Ar Automatic Reconciliation   metoprolol tartrate (LOPRESSOR) 25 MG tablet Take 25 mg by mouth 2 times daily Yes Ar Automatic Reconciliation   OLANZapine (ZYPREXA) 5 MG tablet Take by mouth Yes Ar Automatic Reconciliation   vitamin E 1000 units capsule Take 1,000 Units by mouth daily Yes Ar Automatic Reconciliation   aspirin 81 MG chewable tablet Take 81 mg by mouth daily  Patient not taking: Reported on 6/23/2022  Ar Automatic Reconciliation   budesonide-formoterol (SYMBICORT) 160-4.5 MCG/ACT AERO Inhale 1 puff into the lungs 2 times daily  Patient not taking: Reported on 6/23/2022  Ar Automatic Reconciliation   Lidocaine-Menthol 4-1 % LIQD Apply topically  Patient not taking: Reported on 6/23/2022  Ar Automatic Reconciliation       CareTeam (Including outside providers/suppliers regularly involved in providing care):   Patient Care Team:  Ilan Kraus MD as PCP - General (Family Medicine)  Ilan Kraus MD as PCP - [facility] Empaneled Provider     Reviewed and updated this visit:  Tobacco  Allergies  Meds  Problems  Med Hx  Surg Hx  Soc Hx  Fam Hx

## 2022-09-01 ENCOUNTER — TELEPHONE (OUTPATIENT)
Dept: FAMILY MEDICINE CLINIC | Facility: CLINIC | Age: 77
End: 2022-09-01

## 2022-09-01 NOTE — TELEPHONE ENCOUNTER
I spoke with patient who does not have transportation except when her brother is available.   I changed appt to 9/15/22 at 2:30

## 2022-09-01 NOTE — TELEPHONE ENCOUNTER
Pt states that she spoke with [facility] sometime this week and needed to change her appt to [facility] 9/7 @4:30 w/L[place]. Please call pt back @[phone], thank you!

## 2022-09-14 NOTE — PROGRESS NOTES
Teto Stout (: 1945) is a 78 y.o. female, an established patient, is here for evaluation of the following chief complaint(s):  Chief Complaint   Patient presents with    Forms     Form completion          ASSESSMENT/PLAN:  Loulou Razo was seen today for forms. Diagnoses and all orders for this visit:    COPD with asthma ([address]    Mild cognitive impairment    Chronic low back pain, unspecified back pain laterality, unspecified whether sciatica present    Forms completed for paratransit services and for [employer]. On this date, I spent 40 minutes reviewing previous notes, test results and face to face with the patient discussing the diagnosis and importance of compliance with the treatment plan as well as documenting on the day of the visit. Follow Up    Return for f/u as needed. SUBJECTIVE/OBJECTIVE:  At her visit On 22, the following was discussed w/ Dr. Collette Alarcon:     [facility] --Return in 2 weeks (on 2022) for follow up paperwork . Vitals:    09/15/22 1428   Pulse: 91   Resp: 14   Temp: 98.1 °F (36.7 °C)   TempSrc: Oral   SpO2: 97%   Weight: 133 lb 6.4 oz (60.5 kg)   Height: 5' 3\" (1.6 m)        Orders Placed This Encounter    DHEA 50 MG CAPS     Sig: Take 100 mg by mouth every morning     An electronic signature was used to authenticate this note.   -- GEORGE Correa

## 2022-09-15 ENCOUNTER — OFFICE VISIT (OUTPATIENT)
Dept: FAMILY MEDICINE CLINIC | Facility: CLINIC | Age: 77
End: 2022-09-15
Payer: MEDICARE

## 2022-09-15 VITALS
BODY MASS INDEX: 23.64 KG/M2 | WEIGHT: 133.4 LBS | HEIGHT: 63 IN | OXYGEN SATURATION: 97 % | HEART RATE: 91 BPM | TEMPERATURE: 98.1 F | RESPIRATION RATE: 14 BRPM

## 2022-09-15 DIAGNOSIS — M54.50 CHRONIC LOW BACK PAIN, UNSPECIFIED BACK PAIN LATERALITY, UNSPECIFIED WHETHER SCIATICA PRESENT: ICD-10-CM

## 2022-09-15 DIAGNOSIS — G89.29 CHRONIC LOW BACK PAIN, UNSPECIFIED BACK PAIN LATERALITY, UNSPECIFIED WHETHER SCIATICA PRESENT: ICD-10-CM

## 2022-09-15 DIAGNOSIS — G31.84 MILD COGNITIVE IMPAIRMENT: ICD-10-CM

## 2022-09-15 DIAGNOSIS — J44.9 COPD WITH ASTHMA (HCC): Primary | ICD-10-CM

## 2022-09-15 PROCEDURE — 1090F PRES/ABSN URINE INCON ASSESS: CPT | Performed by: PHYSICIAN ASSISTANT

## 2022-09-15 PROCEDURE — G8427 DOCREV CUR MEDS BY ELIG CLIN: HCPCS | Performed by: PHYSICIAN ASSISTANT

## 2022-09-15 PROCEDURE — 3023F SPIROM DOC REV: CPT | Performed by: PHYSICIAN ASSISTANT

## 2022-09-15 PROCEDURE — 1036F TOBACCO NON-USER: CPT | Performed by: PHYSICIAN ASSISTANT

## 2022-09-15 PROCEDURE — G8420 CALC BMI NORM PARAMETERS: HCPCS | Performed by: PHYSICIAN ASSISTANT

## 2022-09-15 PROCEDURE — 99215 OFFICE O/P EST HI 40 MIN: CPT | Performed by: PHYSICIAN ASSISTANT

## 2022-09-15 PROCEDURE — G8400 PT W/DXA NO RESULTS DOC: HCPCS | Performed by: PHYSICIAN ASSISTANT

## 2022-09-15 PROCEDURE — 1123F ACP DISCUSS/DSCN MKR DOCD: CPT | Performed by: PHYSICIAN ASSISTANT

## 2022-09-15 PROCEDURE — 3017F COLORECTAL CA SCREEN DOC REV: CPT | Performed by: PHYSICIAN ASSISTANT

## 2022-09-15 RX ORDER — PRASTERONE (DHEA) 50 MG
100 CAPSULE ORAL EVERY MORNING
COMMUNITY

## 2022-09-15 ASSESSMENT — PATIENT HEALTH QUESTIONNAIRE - PHQ9
SUM OF ALL RESPONSES TO PHQ QUESTIONS 1-9: 2
SUM OF ALL RESPONSES TO PHQ QUESTIONS 1-9: 2
2. FEELING DOWN, DEPRESSED OR HOPELESS: 1
SUM OF ALL RESPONSES TO PHQ9 QUESTIONS 1 & 2: 2
SUM OF ALL RESPONSES TO PHQ QUESTIONS 1-9: 2
1. LITTLE INTEREST OR PLEASURE IN DOING THINGS: 1
SUM OF ALL RESPONSES TO PHQ QUESTIONS 1-9: 2

## 2022-10-05 ENCOUNTER — TELEPHONE (OUTPATIENT)
Dept: FAMILY MEDICINE CLINIC | Facility: CLINIC | Age: 77
End: 2022-10-05

## 2022-10-05 NOTE — TELEPHONE ENCOUNTER
----- Message from Sapna Alcantara sent at 10/5/2022  1:38 PM EDT -----  Subject: Message to Provider    QUESTIONS  Information for Provider? Patient would like to speak with Jackie Ridley   regarding lab work and the cost of it. Please call   ---------------------------------------------------------------------------  --------------  Delaney Curran INFO  [phone]; OK to leave message on voicemail  ---------------------------------------------------------------------------  --------------  SCRIPT ANSWERS  Relationship to Patient?  Self

## 2022-10-05 NOTE — TELEPHONE ENCOUNTER
Her Psychiatrist is wanting the following labs : Lipids. Blood sugar and A1C. The psychiatrist wants these done very soon. The patient has a rash and a welt on by her eye (the size of a dime). States she is taking benadryl and ketoconazole cream.     Patient would like a referral to dermatology. Pt made an appt on 10/6/22.

## 2022-10-06 ENCOUNTER — OFFICE VISIT (OUTPATIENT)
Dept: FAMILY MEDICINE CLINIC | Facility: CLINIC | Age: 77
End: 2022-10-06
Payer: MEDICARE

## 2022-10-06 VITALS
TEMPERATURE: 98.3 F | HEIGHT: 63 IN | OXYGEN SATURATION: 97 % | BODY MASS INDEX: 23.99 KG/M2 | SYSTOLIC BLOOD PRESSURE: 140 MMHG | HEART RATE: 82 BPM | WEIGHT: 135.4 LBS | DIASTOLIC BLOOD PRESSURE: 78 MMHG | RESPIRATION RATE: 14 BRPM

## 2022-10-06 DIAGNOSIS — T50.915A ALLERGIC DRUG RASH DUE TO MULTIPLE AGENTS: ICD-10-CM

## 2022-10-06 DIAGNOSIS — Z13.1 DIABETES MELLITUS SCREENING: ICD-10-CM

## 2022-10-06 DIAGNOSIS — L27.0 ALLERGIC DRUG RASH DUE TO MULTIPLE AGENTS: ICD-10-CM

## 2022-10-06 DIAGNOSIS — E78.5 DYSLIPIDEMIA: Primary | ICD-10-CM

## 2022-10-06 DIAGNOSIS — R68.83 CHILLS: ICD-10-CM

## 2022-10-06 PROCEDURE — 1036F TOBACCO NON-USER: CPT | Performed by: PHYSICIAN ASSISTANT

## 2022-10-06 PROCEDURE — 1090F PRES/ABSN URINE INCON ASSESS: CPT | Performed by: PHYSICIAN ASSISTANT

## 2022-10-06 PROCEDURE — G8484 FLU IMMUNIZE NO ADMIN: HCPCS | Performed by: PHYSICIAN ASSISTANT

## 2022-10-06 PROCEDURE — 99214 OFFICE O/P EST MOD 30 MIN: CPT | Performed by: PHYSICIAN ASSISTANT

## 2022-10-06 PROCEDURE — 3017F COLORECTAL CA SCREEN DOC REV: CPT | Performed by: PHYSICIAN ASSISTANT

## 2022-10-06 PROCEDURE — G8400 PT W/DXA NO RESULTS DOC: HCPCS | Performed by: PHYSICIAN ASSISTANT

## 2022-10-06 PROCEDURE — 1123F ACP DISCUSS/DSCN MKR DOCD: CPT | Performed by: PHYSICIAN ASSISTANT

## 2022-10-06 PROCEDURE — G8428 CUR MEDS NOT DOCUMENT: HCPCS | Performed by: PHYSICIAN ASSISTANT

## 2022-10-06 PROCEDURE — G8420 CALC BMI NORM PARAMETERS: HCPCS | Performed by: PHYSICIAN ASSISTANT

## 2022-10-06 RX ORDER — ESCITALOPRAM OXALATE 10 MG/1
TABLET ORAL
COMMUNITY
Start: 2022-10-03

## 2022-10-06 RX ORDER — TRIAMCINOLONE ACETONIDE 1 MG/G
CREAM TOPICAL
Qty: 30 G | Refills: 0 | Status: SHIPPED | OUTPATIENT
Start: 2022-10-06

## 2022-10-06 NOTE — PROGRESS NOTES
Asia Christie (: 1945) is a 78 y.o. female, an established patient, is here for evaluation of the following chief complaint(s):  Chief Complaint   Patient presents with    Rash          ASSESSMENT/PLAN:  Devaughn Call was seen today for rash. Diagnoses and all orders for this visit:    Dyslipidemia  -     Lipid Panel; Future  -     Comprehensive Metabolic Panel; Future    Diabetes mellitus screening  -     Comprehensive Metabolic Panel; Future  -     Hemoglobin A1C; Future      She is seeing Dr. Joaquin Connell. Will     She applied PONDS Cold Cream to her face (which she's used previously w/ no problems. She ended up breaking out in a reddened  rash after applying. She then applied a moisturizer that treats skin spots, but rash has not resolved. She is having a cutaneous rash on her face (see photo below). Will treat w/ Triamcinolone. Advised her not to use any other creams on her face until treatment has been completed. Follow Up    Please send to lab today for bloodwork  2 mos for HTN mgt  (w/ Dr. Salty Horne) w/ labs prior to visit    SUBJECTIVE/OBJECTIVE:  Her Psychiatrist is wanting the following labs : Lipids. Blood sugar and A1C. The psychiatrist wants these done very soon. Bad rash on face past week or so, worse past couple days. Pharmacist had her take Benadryl. Mouth was burning after taking Iodine  yesterday. The patient has a rash and a welt on by her eye (the size of a dime). States she is taking benadryl and ketoconazole cream.  Patient would like a referral to dermatology          Vitals:    10/06/22 1409   BP: (!) 140/78   Pulse: 82   Resp: 14   Temp: 98.3 °F (36.8 °C)   TempSrc: Oral   SpO2: 97%   Weight: 135 lb 6.4 oz (61.4 kg)   Height: 5' 3\" (1.6 m)        Her Psychiatrist is wanting the following labs : Lipids. Blood sugar and A1C. The psychiatrist wants these done very soon. Bad rash on face past week or so, worse past couple days.   Pharmacist had her take Benadryl. Mouth was burning after taking Iodine  yesterday. The patient has a rash and a welt on by her eye (the size of a dime). States she is taking benadryl and ketoconazole cream.  Patient would like a referral to dermatology              Orders Placed This Encounter    Lipid Panel     Standing Status:   Future     Standing Expiration Date:   11/6/2022    Comprehensive Metabolic Panel     Standing Status:   Future     Standing Expiration Date:   11/6/2022    Hemoglobin A1C     Standing Status:   Future     Standing Expiration Date:   4/6/2023    escitalopram (LEXAPRO) 10 MG tablet             An electronic signature was used to authenticate this note.   -- GEORGE Coles

## 2022-10-07 LAB
ALBUMIN SERPL-MCNC: 3.8 G/DL (ref 3.2–4.6)
ALBUMIN/GLOB SERPL: 1.5 {RATIO} (ref 1.2–3.5)
ALP SERPL-CCNC: 99 U/L (ref 50–136)
ALT SERPL-CCNC: 28 U/L (ref 12–65)
ANION GAP SERPL CALC-SCNC: 6 MMOL/L (ref 4–13)
AST SERPL-CCNC: 15 U/L (ref 15–37)
BASOPHILS # BLD: 0.1 K/UL (ref 0–0.2)
BASOPHILS NFR BLD: 1 % (ref 0–2)
BILIRUB SERPL-MCNC: 0.4 MG/DL (ref 0.2–1.1)
BUN SERPL-MCNC: 9 MG/DL (ref 8–23)
CALCIUM SERPL-MCNC: 10.1 MG/DL (ref 8.3–10.4)
CHLORIDE SERPL-SCNC: 100 MMOL/L (ref 101–110)
CHOLEST SERPL-MCNC: 192 MG/DL
CO2 SERPL-SCNC: 27 MMOL/L (ref 21–32)
CREAT SERPL-MCNC: 0.6 MG/DL (ref 0.6–1)
DIFFERENTIAL METHOD BLD: ABNORMAL
EOSINOPHIL # BLD: 0.1 K/UL (ref 0–0.8)
EOSINOPHIL NFR BLD: 2 % (ref 0.5–7.8)
ERYTHROCYTE [DISTWIDTH] IN BLOOD BY AUTOMATED COUNT: 12 % (ref 11.9–14.6)
EST. AVERAGE GLUCOSE BLD GHB EST-MCNC: 103 MG/DL
GLOBULIN SER CALC-MCNC: 2.6 G/DL (ref 2.3–3.5)
GLUCOSE SERPL-MCNC: 90 MG/DL (ref 65–100)
HBA1C MFR BLD: 5.2 % (ref 4.8–5.6)
HCT VFR BLD AUTO: 38.1 % (ref 35.8–46.3)
HDLC SERPL-MCNC: 49 MG/DL (ref 40–60)
HDLC SERPL: 3.9 {RATIO}
HGB BLD-MCNC: 13.3 G/DL (ref 11.7–15.4)
IMM GRANULOCYTES # BLD AUTO: 0.1 K/UL (ref 0–0.5)
IMM GRANULOCYTES NFR BLD AUTO: 1 % (ref 0–5)
LDLC SERPL CALC-MCNC: 115.6 MG/DL
LYMPHOCYTES # BLD: 1.4 K/UL (ref 0.5–4.6)
LYMPHOCYTES NFR BLD: 19 % (ref 13–44)
MCH RBC QN AUTO: 33 PG (ref 26.1–32.9)
MCHC RBC AUTO-ENTMCNC: 34.9 G/DL (ref 31.4–35)
MCV RBC AUTO: 94.5 FL (ref 79.6–97.8)
MONOCYTES # BLD: 0.6 K/UL (ref 0.1–1.3)
MONOCYTES NFR BLD: 7 % (ref 4–12)
NEUTS SEG # BLD: 5.5 K/UL (ref 1.7–8.2)
NEUTS SEG NFR BLD: 70 % (ref 43–78)
NRBC # BLD: 0 K/UL (ref 0–0.2)
PLATELET # BLD AUTO: 231 K/UL (ref 150–450)
PMV BLD AUTO: 8.7 FL (ref 9.4–12.3)
POTASSIUM SERPL-SCNC: 4 MMOL/L (ref 3.5–5.1)
PROT SERPL-MCNC: 6.4 G/DL (ref 6.3–8.2)
RBC # BLD AUTO: 4.03 M/UL (ref 4.05–5.2)
SODIUM SERPL-SCNC: 133 MMOL/L (ref 136–145)
TRIGL SERPL-MCNC: 137 MG/DL (ref 35–150)
VLDLC SERPL CALC-MCNC: 27.4 MG/DL (ref 6–23)
WBC # BLD AUTO: 7.8 K/UL (ref 4.3–11.1)

## 2022-10-17 ENCOUNTER — TELEPHONE (OUTPATIENT)
Dept: FAMILY MEDICINE CLINIC | Facility: CLINIC | Age: 77
End: 2022-10-17

## 2022-10-17 NOTE — TELEPHONE ENCOUNTER
Please have her make an appt for swelling.  Overall her labs looked good - we can discuss in more detail at her appt

## 2022-10-17 NOTE — TELEPHONE ENCOUNTER
Patient called has some swelling in hands, ankle and feet and want to know what she need to do about that. Patient never was called about her lab results and she don't know if she's a diabetic since no one has called her.

## 2022-10-17 NOTE — TELEPHONE ENCOUNTER
Tried to make an appointment with Dr. Phu Garcia or Isma Velasquez. Both did not have appointments until next week or the end of October. Patient was offered an appointment with Melissa Aiken NP. Patient did not know her therefore di not want to see her. She was given a choice to see the NP or go to Urgent Care. Patient wanted to go to Urgent Care. Information of nearest  at the patient's address given to the patient.

## 2022-10-26 ENCOUNTER — APPOINTMENT (RX ONLY)
Dept: URBAN - METROPOLITAN AREA CLINIC 330 | Facility: CLINIC | Age: 77
Setting detail: DERMATOLOGY
End: 2022-10-26

## 2022-10-26 DIAGNOSIS — Z71.89 OTHER SPECIFIED COUNSELING: ICD-10-CM

## 2022-10-26 DIAGNOSIS — D18.0 HEMANGIOMA: ICD-10-CM

## 2022-10-26 DIAGNOSIS — I83.9 ASYMPTOMATIC VARICOSE VEINS OF LOWER EXTREMITIES: ICD-10-CM

## 2022-10-26 DIAGNOSIS — L73.8 OTHER SPECIFIED FOLLICULAR DISORDERS: ICD-10-CM

## 2022-10-26 DIAGNOSIS — Z85.828 PERSONAL HISTORY OF OTHER MALIGNANT NEOPLASM OF SKIN: ICD-10-CM

## 2022-10-26 DIAGNOSIS — L21.8 OTHER SEBORRHEIC DERMATITIS: ICD-10-CM | Status: INADEQUATELY CONTROLLED

## 2022-10-26 DIAGNOSIS — L30.9 DERMATITIS, UNSPECIFIED: ICD-10-CM

## 2022-10-26 DIAGNOSIS — L57.0 ACTINIC KERATOSIS: ICD-10-CM

## 2022-10-26 DIAGNOSIS — L81.4 OTHER MELANIN HYPERPIGMENTATION: ICD-10-CM

## 2022-10-26 DIAGNOSIS — L82.1 OTHER SEBORRHEIC KERATOSIS: ICD-10-CM

## 2022-10-26 DIAGNOSIS — D22 MELANOCYTIC NEVI: ICD-10-CM

## 2022-10-26 PROBLEM — D18.01 HEMANGIOMA OF SKIN AND SUBCUTANEOUS TISSUE: Status: ACTIVE | Noted: 2022-10-26

## 2022-10-26 PROBLEM — I83.91 ASYMPTOMATIC VARICOSE VEINS OF RIGHT LOWER EXTREMITY: Status: ACTIVE | Noted: 2022-10-26

## 2022-10-26 PROBLEM — D22.5 MELANOCYTIC NEVI OF TRUNK: Status: ACTIVE | Noted: 2022-10-26

## 2022-10-26 PROCEDURE — ? TREATMENT REGIMEN

## 2022-10-26 PROCEDURE — ? PRESCRIPTION MEDICATION MANAGEMENT

## 2022-10-26 PROCEDURE — 17000 DESTRUCT PREMALG LESION: CPT

## 2022-10-26 PROCEDURE — ? OTHER

## 2022-10-26 PROCEDURE — 99204 OFFICE O/P NEW MOD 45 MIN: CPT | Mod: 25

## 2022-10-26 PROCEDURE — ? LIQUID NITROGEN

## 2022-10-26 PROCEDURE — ? PRESCRIPTION

## 2022-10-26 PROCEDURE — ? ADDITIONAL NOTES

## 2022-10-26 PROCEDURE — ? COUNSELING

## 2022-10-26 PROCEDURE — ? FULL BODY SKIN EXAM

## 2022-10-26 RX ORDER — KETOCONAZOLE 20 MG/G
CREAM TOPICAL
Qty: 60 | Refills: 3 | Status: ERX | COMMUNITY
Start: 2022-10-26

## 2022-10-26 RX ADMIN — KETOCONAZOLE: 20 CREAM TOPICAL at 00:00

## 2022-10-26 ASSESSMENT — LOCATION DETAILED DESCRIPTION DERM
LOCATION DETAILED: RIGHT ANTERIOR PROXIMAL THIGH
LOCATION DETAILED: LEFT VENTRAL DISTAL FOREARM
LOCATION DETAILED: LEFT SUPERIOR ANTERIOR NECK
LOCATION DETAILED: NASAL DORSUM
LOCATION DETAILED: LEFT CENTRAL MALAR CHEEK
LOCATION DETAILED: RIGHT MEDIAL UPPER BACK
LOCATION DETAILED: RIGHT INFERIOR UPPER BACK
LOCATION DETAILED: LEFT INFERIOR MEDIAL MALAR CHEEK
LOCATION DETAILED: INFERIOR THORACIC SPINE

## 2022-10-26 ASSESSMENT — LOCATION SIMPLE DESCRIPTION DERM
LOCATION SIMPLE: LEFT CHEEK
LOCATION SIMPLE: RIGHT THIGH
LOCATION SIMPLE: UPPER BACK
LOCATION SIMPLE: LEFT ANTERIOR NECK
LOCATION SIMPLE: NOSE
LOCATION SIMPLE: RIGHT UPPER BACK
LOCATION SIMPLE: LEFT FOREARM

## 2022-10-26 ASSESSMENT — LOCATION ZONE DERM
LOCATION ZONE: LEG
LOCATION ZONE: NOSE
LOCATION ZONE: FACE
LOCATION ZONE: TRUNK
LOCATION ZONE: ARM
LOCATION ZONE: NECK

## 2022-10-26 NOTE — PROCEDURE: OTHER
Other (Free Text): Pt states her hands have been swelling. \\n\\nOffered to prescribe topical steroid. Pt declines
Detail Level: Zone
Render Risk Assessment In Note?: no
Note Text (......Xxx Chief Complaint.): This diagnosis correlates with the
Other (Free Text): Offered to refer to vascular specialist
Other (Free Text): Pt was previously prescribed fluocinolone solution by Dr. Topete

## 2022-10-26 NOTE — PROCEDURE: MIPS QUALITY
Quality 130: Documentation Of Current Medications In The Medical Record: Current Medications Documented
Detail Level: Detailed
Quality 431: Preventive Care And Screening: Unhealthy Alcohol Use - Screening: Patient not identified as an unhealthy alcohol user when screened for unhealthy alcohol use using a systematic screening method
Quality 110: Preventive Care And Screening: Influenza Immunization: Influenza Immunization previously received during influenza season
Quality 226: Preventive Care And Screening: Tobacco Use: Screening And Cessation Intervention: Patient screened for tobacco use and is an ex/non-smoker
Quality 111:Pneumonia Vaccination Status For Older Adults: Pneumococcal vaccine (PPSV23) administered on or after patient’s 60th birthday and before the end of the measurement period

## 2022-10-26 NOTE — PROCEDURE: PRESCRIPTION MEDICATION MANAGEMENT
Initiate Treatment: Ketoconazole cream bid to face for two weeks then 1-2x weekly for maintenance
Render In Strict Bullet Format?: No
Detail Level: Zone

## 2022-11-02 ENCOUNTER — TELEPHONE (OUTPATIENT)
Dept: FAMILY MEDICINE CLINIC | Facility: CLINIC | Age: 77
End: 2022-11-02

## 2022-11-02 NOTE — TELEPHONE ENCOUNTER
----- Message from Kayleigh Abbott 84 sent at 11/2/2022  8:54 AM EDT -----  Regarding: Labs  Pt needs lab orders.

## 2022-11-11 ENCOUNTER — TELEPHONE (OUTPATIENT)
Dept: FAMILY MEDICINE CLINIC | Facility: CLINIC | Age: 77
End: 2022-11-11

## 2022-11-11 RX ORDER — MECLIZINE HCL 12.5 MG/1
12.5 TABLET ORAL 3 TIMES DAILY PRN
Qty: 15 TABLET | Refills: 2 | Status: SHIPPED | OUTPATIENT
Start: 2022-11-11 | End: 2022-11-21

## 2022-11-14 RX ORDER — FLUTICASONE PROPIONATE 50 MCG
SPRAY, SUSPENSION (ML) NASAL
Qty: 3 EACH | Refills: 3 | Status: SHIPPED | OUTPATIENT
Start: 2022-11-14

## 2022-12-07 ENCOUNTER — OFFICE VISIT (OUTPATIENT)
Dept: FAMILY MEDICINE CLINIC | Facility: CLINIC | Age: 77
End: 2022-12-07
Payer: MEDICARE

## 2022-12-07 ENCOUNTER — HOME HEALTH ADMISSION (OUTPATIENT)
Dept: HOME HEALTH SERVICES | Facility: HOME HEALTH | Age: 77
End: 2022-12-07
Payer: MEDICARE

## 2022-12-07 VITALS
SYSTOLIC BLOOD PRESSURE: 134 MMHG | HEART RATE: 97 BPM | DIASTOLIC BLOOD PRESSURE: 72 MMHG | HEIGHT: 63 IN | OXYGEN SATURATION: 97 % | TEMPERATURE: 97.9 F | BODY MASS INDEX: 24.45 KG/M2 | WEIGHT: 138 LBS

## 2022-12-07 DIAGNOSIS — R29.6 FREQUENT FALLS: ICD-10-CM

## 2022-12-07 DIAGNOSIS — R42 VERTIGO: ICD-10-CM

## 2022-12-07 DIAGNOSIS — G31.84 MILD COGNITIVE IMPAIRMENT: ICD-10-CM

## 2022-12-07 DIAGNOSIS — I10 ESSENTIAL (PRIMARY) HYPERTENSION: Primary | ICD-10-CM

## 2022-12-07 PROCEDURE — 3017F COLORECTAL CA SCREEN DOC REV: CPT | Performed by: FAMILY MEDICINE

## 2022-12-07 PROCEDURE — 99215 OFFICE O/P EST HI 40 MIN: CPT | Performed by: FAMILY MEDICINE

## 2022-12-07 PROCEDURE — G8427 DOCREV CUR MEDS BY ELIG CLIN: HCPCS | Performed by: FAMILY MEDICINE

## 2022-12-07 PROCEDURE — 1123F ACP DISCUSS/DSCN MKR DOCD: CPT | Performed by: FAMILY MEDICINE

## 2022-12-07 PROCEDURE — 1090F PRES/ABSN URINE INCON ASSESS: CPT | Performed by: FAMILY MEDICINE

## 2022-12-07 PROCEDURE — 3078F DIAST BP <80 MM HG: CPT | Performed by: FAMILY MEDICINE

## 2022-12-07 PROCEDURE — 3074F SYST BP LT 130 MM HG: CPT | Performed by: FAMILY MEDICINE

## 2022-12-07 PROCEDURE — G8400 PT W/DXA NO RESULTS DOC: HCPCS | Performed by: FAMILY MEDICINE

## 2022-12-07 PROCEDURE — G8420 CALC BMI NORM PARAMETERS: HCPCS | Performed by: FAMILY MEDICINE

## 2022-12-07 PROCEDURE — 1036F TOBACCO NON-USER: CPT | Performed by: FAMILY MEDICINE

## 2022-12-07 PROCEDURE — G8484 FLU IMMUNIZE NO ADMIN: HCPCS | Performed by: FAMILY MEDICINE

## 2022-12-07 RX ORDER — KETOCONAZOLE 20 MG/G
CREAM TOPICAL
COMMUNITY
Start: 2022-10-26

## 2022-12-07 RX ORDER — MECLIZINE HCL 12.5 MG/1
12.5 TABLET ORAL 3 TIMES DAILY PRN
Qty: 15 TABLET | Refills: 1 | Status: SHIPPED | OUTPATIENT
Start: 2022-12-07 | End: 2022-12-17

## 2022-12-07 ASSESSMENT — PATIENT HEALTH QUESTIONNAIRE - PHQ9
SUM OF ALL RESPONSES TO PHQ QUESTIONS 1-9: 1
SUM OF ALL RESPONSES TO PHQ QUESTIONS 1-9: 1
1. LITTLE INTEREST OR PLEASURE IN DOING THINGS: 0
SUM OF ALL RESPONSES TO PHQ QUESTIONS 1-9: 1
2. FEELING DOWN, DEPRESSED OR HOPELESS: 1
SUM OF ALL RESPONSES TO PHQ9 QUESTIONS 1 & 2: 1
SUM OF ALL RESPONSES TO PHQ QUESTIONS 1-9: 1

## 2022-12-07 ASSESSMENT — ENCOUNTER SYMPTOMS
SHORTNESS OF BREATH: 0
COUGH: 0

## 2022-12-07 NOTE — PROGRESS NOTES
Liberty Deshpande (: 1945) is a 78 y.o. female, established patient, here for evaluation of the following chief complaint(s):  Hypertension and Medication Problem (Albuterol   /Patients wants fluticasone twice a day /)       ASSESSMENT/PLAN:  1. Essential (primary) hypertension  -     TSH with Reflex; Future  2. Frequent falls  -     [address] [facility]  3. Vertigo  -     meclizine (ANTIVERT) 12.5 MG tablet; Take 1 tablet by mouth 3 times daily as needed for Dizziness, Disp-15 tablet, R-1Normal  4. Mild cognitive impairment    Blood pressure normal today, will continue current regiment. Reviewed blood work, patient would like to add TSH as well. She has had frequent falls with most recent 1 2 and half weeks ago, will refer to home health physical therapy to have them assess her home for fall prevention as well as work on balance. She has issues with vertigo, uses meclizine as needed. We will continue this. Provided the patient with lengthy reassurance to all of her questions and concerns. Return in about 3 months (around 3/7/2023) for follow up HTN. SUBJECTIVE/OBJECTIVE:  HPI  81-year-old female with a history of hypertension, mild cognitive impairment, bipolar who presents for follow-up. She had a recent fall about 2 and half weeks ago, she was up going to the bathroom around 3 AM and tripped and fell into the tub. She said she hit her head slightly. She was able to get up, she saw orthopedics about a week later and got a hip injection. Denies any loss of consciousness or headaches. She is having some issues with dizziness and vertigo, she has meclizine to take at home as needed but she would like a refill on this. She is confused about her inhaler, as well as her Flonase prescriptions to clarification. She would also like her TSH checked. Review of Systems   Constitutional:  Negative for activity change, appetite change, fever and unexpected weight change. Respiratory:  Negative for cough and shortness of breath. Cardiovascular:  Negative for chest pain and palpitations. Skin:  Negative for rash. Neurological:  Negative for dizziness and headaches. Psychiatric/Behavioral:  Negative for sleep disturbance. Vitals:    12/07/22 1348   BP: 134/72   Pulse: 97   Temp: 97.9 °F (36.6 °C)   SpO2: 97%       Physical Exam  Vitals reviewed. Constitutional:       General: She is not in acute distress. Appearance: Normal appearance. She is not ill-appearing or toxic-appearing. HENT:      Head: Normocephalic and atraumatic. Eyes:      Conjunctiva/sclera: Conjunctivae normal.   Cardiovascular:      Rate and Rhythm: Normal rate and regular rhythm. Heart sounds: Normal heart sounds. No murmur heard. No friction rub. No gallop. Pulmonary:      Effort: Pulmonary effort is normal. No respiratory distress. Breath sounds: Normal breath sounds. No wheezing, rhonchi or rales. Musculoskeletal:         General: No swelling. Normal range of motion. Skin:     General: Skin is warm. Findings: No rash. Neurological:      Mental Status: She is alert. Mental status is at baseline. Psychiatric:         Mood and Affect: Mood normal.          On this date, I spent 60 minutes reviewing previous notes, test results and face to face with the patient discussing the diagnosis and importance of compliance with the treatment plan as well as documenting on the day of the visit. An electronic signature was used to authenticate this note.   -- Luca Santiago MD

## 2022-12-08 ENCOUNTER — HOME CARE VISIT (OUTPATIENT)
Dept: HOME HEALTH SERVICES | Facility: HOME HEALTH | Age: 77
End: 2022-12-08

## 2022-12-08 LAB — TSH W FREE THYROID IF ABNORMAL: 2.26 UIU/ML (ref 0.36–3.74)

## 2022-12-09 ENCOUNTER — HOME CARE VISIT (OUTPATIENT)
Dept: SCHEDULING | Facility: HOME HEALTH | Age: 77
End: 2022-12-09

## 2022-12-09 VITALS
OXYGEN SATURATION: 97 % | HEART RATE: 79 BPM | RESPIRATION RATE: 18 BRPM | DIASTOLIC BLOOD PRESSURE: 72 MMHG | SYSTOLIC BLOOD PRESSURE: 130 MMHG | TEMPERATURE: 98.5 F

## 2022-12-09 PROCEDURE — G0151 HHCP-SERV OF PT,EA 15 MIN: HCPCS

## 2022-12-09 PROCEDURE — 0221000100 HH NO PAY CLAIM PROCEDURE

## 2022-12-09 ASSESSMENT — ENCOUNTER SYMPTOMS
COUGH: 1
PAIN LOCATION - PAIN QUALITY: ACHY
COUGH CHARACTERISTICS: NON-PRODUCTIVE

## 2022-12-10 ASSESSMENT — ENCOUNTER SYMPTOMS: DYSPNEA ACTIVITY LEVEL: AFTER AMBULATING 10 - 20 FT

## 2022-12-12 ENCOUNTER — TELEPHONE (OUTPATIENT)
Dept: FAMILY MEDICINE CLINIC | Facility: CLINIC | Age: 77
End: 2022-12-12

## 2022-12-12 NOTE — TELEPHONE ENCOUNTER
Nicole Lake from  home care called for verbal orders 6 home care pt visits. Verbal orders given    Nicole Lake also asked for nursing, speach therapy, and occupational therapy.     Elena's number is [phone]

## 2022-12-13 ENCOUNTER — HOME CARE VISIT (OUTPATIENT)
Dept: SCHEDULING | Facility: HOME HEALTH | Age: 77
End: 2022-12-13
Payer: MEDICARE

## 2022-12-13 VITALS
DIASTOLIC BLOOD PRESSURE: 72 MMHG | HEART RATE: 74 BPM | TEMPERATURE: 98.7 F | SYSTOLIC BLOOD PRESSURE: 125 MMHG | RESPIRATION RATE: 16 BRPM | OXYGEN SATURATION: 97 %

## 2022-12-13 PROCEDURE — G0151 HHCP-SERV OF PT,EA 15 MIN: HCPCS

## 2022-12-13 ASSESSMENT — ENCOUNTER SYMPTOMS: PAIN LOCATION - PAIN QUALITY: SORE, ACHY

## 2022-12-14 ENCOUNTER — TELEPHONE (OUTPATIENT)
Dept: FAMILY MEDICINE CLINIC | Facility: CLINIC | Age: 77
End: 2022-12-14

## 2022-12-14 NOTE — TELEPHONE ENCOUNTER
----- Message from Gale Mess sent at 12/13/2022  3:33 PM EST -----  Subject: Message to Provider    QUESTIONS  Information for Provider? Per Mirella Ramirez her rep payee who pays all bills,   bills from [facility] need to go to [address]. NEEDS MAILING ADDRESS CHANGED   ---------------------------------------------------------------------------  --------------  Aurelio ROBLES  [phone]; OK to leave message on voicemail  ---------------------------------------------------------------------------  --------------  SCRIPT ANSWERS  Relationship to Patient? Other  Representative Name? Rasheed Alvarez  Is the Representative on the appropriate HIPAA document in Epic?  Yes

## 2022-12-16 ENCOUNTER — HOME CARE VISIT (OUTPATIENT)
Dept: SCHEDULING | Facility: HOME HEALTH | Age: 77
End: 2022-12-16
Payer: MEDICARE

## 2022-12-16 VITALS
TEMPERATURE: 98.6 F | OXYGEN SATURATION: 96 % | HEART RATE: 87 BPM | RESPIRATION RATE: 16 BRPM | DIASTOLIC BLOOD PRESSURE: 72 MMHG | SYSTOLIC BLOOD PRESSURE: 134 MMHG

## 2022-12-16 PROCEDURE — G0151 HHCP-SERV OF PT,EA 15 MIN: HCPCS

## 2022-12-16 ASSESSMENT — ENCOUNTER SYMPTOMS: PAIN LOCATION - PAIN QUALITY: SORE

## 2022-12-19 ENCOUNTER — HOME CARE VISIT (OUTPATIENT)
Dept: SCHEDULING | Facility: HOME HEALTH | Age: 77
End: 2022-12-19
Payer: MEDICARE

## 2022-12-19 PROCEDURE — G0152 HHCP-SERV OF OT,EA 15 MIN: HCPCS

## 2022-12-20 ENCOUNTER — HOME CARE VISIT (OUTPATIENT)
Dept: SCHEDULING | Facility: HOME HEALTH | Age: 77
End: 2022-12-20
Payer: MEDICARE

## 2022-12-20 VITALS
RESPIRATION RATE: 16 BRPM | OXYGEN SATURATION: 97 % | DIASTOLIC BLOOD PRESSURE: 78 MMHG | SYSTOLIC BLOOD PRESSURE: 134 MMHG | TEMPERATURE: 98.4 F | HEART RATE: 78 BPM

## 2022-12-20 VITALS
TEMPERATURE: 98.2 F | RESPIRATION RATE: 16 BRPM | HEART RATE: 75 BPM | SYSTOLIC BLOOD PRESSURE: 126 MMHG | OXYGEN SATURATION: 97 % | DIASTOLIC BLOOD PRESSURE: 82 MMHG

## 2022-12-20 PROCEDURE — G0153 HHCP-SVS OF S/L PATH,EA 15MN: HCPCS

## 2022-12-20 PROCEDURE — G0151 HHCP-SERV OF PT,EA 15 MIN: HCPCS

## 2022-12-20 ASSESSMENT — ENCOUNTER SYMPTOMS: PAIN LOCATION - PAIN QUALITY: SORE, ACHY

## 2022-12-21 ENCOUNTER — HOME CARE VISIT (OUTPATIENT)
Dept: SCHEDULING | Facility: HOME HEALTH | Age: 77
End: 2022-12-21
Payer: MEDICARE

## 2022-12-21 ENCOUNTER — TELEPHONE (OUTPATIENT)
Dept: FAMILY MEDICINE CLINIC | Facility: CLINIC | Age: 77
End: 2022-12-21

## 2022-12-21 VITALS
HEART RATE: 81 BPM | RESPIRATION RATE: 16 BRPM | OXYGEN SATURATION: 97 % | TEMPERATURE: 97.6 F | SYSTOLIC BLOOD PRESSURE: 120 MMHG | DIASTOLIC BLOOD PRESSURE: 76 MMHG

## 2022-12-21 VITALS
DIASTOLIC BLOOD PRESSURE: 70 MMHG | HEART RATE: 86 BPM | SYSTOLIC BLOOD PRESSURE: 128 MMHG | RESPIRATION RATE: 18 BRPM | OXYGEN SATURATION: 99 % | TEMPERATURE: 98.7 F

## 2022-12-21 PROCEDURE — G0299 HHS/HOSPICE OF RN EA 15 MIN: HCPCS

## 2022-12-21 ASSESSMENT — ENCOUNTER SYMPTOMS
PAIN LOCATION - PAIN QUALITY: SHARP, ACHY
STOOL DESCRIPTION: SOFT FORMED

## 2022-12-21 NOTE — TELEPHONE ENCOUNTER
Home health called for verbal orders for 1 visit a week for three weeks and three other as needed visits. Verbal orders given.

## 2022-12-22 ENCOUNTER — HOME CARE VISIT (OUTPATIENT)
Dept: SCHEDULING | Facility: HOME HEALTH | Age: 77
End: 2022-12-22
Payer: MEDICARE

## 2022-12-22 ENCOUNTER — HOME CARE VISIT (OUTPATIENT)
Dept: HOME HEALTH SERVICES | Facility: HOME HEALTH | Age: 77
End: 2022-12-22
Payer: MEDICARE

## 2022-12-22 VITALS
SYSTOLIC BLOOD PRESSURE: 126 MMHG | RESPIRATION RATE: 16 BRPM | TEMPERATURE: 98.3 F | HEART RATE: 95 BPM | OXYGEN SATURATION: 96 % | DIASTOLIC BLOOD PRESSURE: 73 MMHG

## 2022-12-22 PROCEDURE — G0151 HHCP-SERV OF PT,EA 15 MIN: HCPCS

## 2022-12-23 ENCOUNTER — TELEPHONE (OUTPATIENT)
Dept: FAMILY MEDICINE CLINIC | Facility: CLINIC | Age: 77
End: 2022-12-23

## 2022-12-23 NOTE — TELEPHONE ENCOUNTER
Patient was informed a few days ago that her TSH was normal.  She wanted the value of her TSH  She was given the recent value of her TSH from 12/07/22.

## 2022-12-28 ENCOUNTER — HOME CARE VISIT (OUTPATIENT)
Dept: HOME HEALTH SERVICES | Facility: HOME HEALTH | Age: 77
End: 2022-12-28
Payer: MEDICARE

## 2022-12-29 ENCOUNTER — HOME CARE VISIT (OUTPATIENT)
Dept: SCHEDULING | Facility: HOME HEALTH | Age: 77
End: 2022-12-29
Payer: MEDICARE

## 2022-12-29 VITALS
RESPIRATION RATE: 16 BRPM | SYSTOLIC BLOOD PRESSURE: 130 MMHG | HEART RATE: 75 BPM | TEMPERATURE: 98.1 F | DIASTOLIC BLOOD PRESSURE: 74 MMHG | OXYGEN SATURATION: 95 %

## 2022-12-29 PROCEDURE — G0151 HHCP-SERV OF PT,EA 15 MIN: HCPCS

## 2022-12-29 ASSESSMENT — ENCOUNTER SYMPTOMS: PAIN LOCATION - PAIN QUALITY: SORE, ACHY

## 2023-01-04 ENCOUNTER — HOME CARE VISIT (OUTPATIENT)
Dept: SCHEDULING | Facility: HOME HEALTH | Age: 78
End: 2023-01-04
Payer: MEDICARE

## 2023-01-04 VITALS
TEMPERATURE: 97 F | SYSTOLIC BLOOD PRESSURE: 124 MMHG | RESPIRATION RATE: 17 BRPM | OXYGEN SATURATION: 97 % | DIASTOLIC BLOOD PRESSURE: 72 MMHG | HEART RATE: 79 BPM

## 2023-01-04 PROCEDURE — G0299 HHS/HOSPICE OF RN EA 15 MIN: HCPCS

## 2023-01-04 ASSESSMENT — ENCOUNTER SYMPTOMS
PAIN LOCATION - PAIN QUALITY: SORE
STOOL DESCRIPTION: FORMED
DYSPNEA ACTIVITY LEVEL: AFTER AMBULATING 10 - 20 FT

## 2023-01-12 ENCOUNTER — HOME CARE VISIT (OUTPATIENT)
Dept: SCHEDULING | Facility: HOME HEALTH | Age: 78
End: 2023-01-12
Payer: MEDICARE

## 2023-01-12 PROCEDURE — G0299 HHS/HOSPICE OF RN EA 15 MIN: HCPCS

## 2023-01-13 VITALS
RESPIRATION RATE: 17 BRPM | SYSTOLIC BLOOD PRESSURE: 128 MMHG | DIASTOLIC BLOOD PRESSURE: 70 MMHG | OXYGEN SATURATION: 97 % | HEART RATE: 79 BPM | TEMPERATURE: 97.1 F

## 2023-01-13 ASSESSMENT — ENCOUNTER SYMPTOMS
STOOL DESCRIPTION: FORMED
DYSPNEA ACTIVITY LEVEL: AFTER AMBULATING MORE THAN 20 FT
PAIN LOCATION - PAIN QUALITY: SORE

## 2023-03-08 ENCOUNTER — OFFICE VISIT (OUTPATIENT)
Dept: FAMILY MEDICINE CLINIC | Facility: CLINIC | Age: 78
End: 2023-03-08
Payer: MEDICARE

## 2023-03-08 VITALS
SYSTOLIC BLOOD PRESSURE: 146 MMHG | OXYGEN SATURATION: 97 % | WEIGHT: 142.6 LBS | DIASTOLIC BLOOD PRESSURE: 90 MMHG | HEART RATE: 81 BPM | BODY MASS INDEX: 25.27 KG/M2 | HEIGHT: 63 IN | RESPIRATION RATE: 18 BRPM | TEMPERATURE: 97.1 F

## 2023-03-08 DIAGNOSIS — J44.9 COPD WITH ASTHMA (HCC): ICD-10-CM

## 2023-03-08 DIAGNOSIS — I10 ESSENTIAL (PRIMARY) HYPERTENSION: Primary | ICD-10-CM

## 2023-03-08 DIAGNOSIS — F31.70 BIPOLAR DISORDER, CURRENTLY IN REMISSION, MOST RECENT EPISODE UNSPECIFIED (HCC): ICD-10-CM

## 2023-03-08 DIAGNOSIS — G31.84 MILD COGNITIVE IMPAIRMENT: ICD-10-CM

## 2023-03-08 PROCEDURE — 1123F ACP DISCUSS/DSCN MKR DOCD: CPT | Performed by: FAMILY MEDICINE

## 2023-03-08 PROCEDURE — G8427 DOCREV CUR MEDS BY ELIG CLIN: HCPCS | Performed by: FAMILY MEDICINE

## 2023-03-08 PROCEDURE — G8484 FLU IMMUNIZE NO ADMIN: HCPCS | Performed by: FAMILY MEDICINE

## 2023-03-08 PROCEDURE — 3017F COLORECTAL CA SCREEN DOC REV: CPT | Performed by: FAMILY MEDICINE

## 2023-03-08 PROCEDURE — G8400 PT W/DXA NO RESULTS DOC: HCPCS | Performed by: FAMILY MEDICINE

## 2023-03-08 PROCEDURE — 3077F SYST BP >= 140 MM HG: CPT | Performed by: FAMILY MEDICINE

## 2023-03-08 PROCEDURE — 1036F TOBACCO NON-USER: CPT | Performed by: FAMILY MEDICINE

## 2023-03-08 PROCEDURE — 3080F DIAST BP >= 90 MM HG: CPT | Performed by: FAMILY MEDICINE

## 2023-03-08 PROCEDURE — 3023F SPIROM DOC REV: CPT | Performed by: FAMILY MEDICINE

## 2023-03-08 PROCEDURE — 99215 OFFICE O/P EST HI 40 MIN: CPT | Performed by: FAMILY MEDICINE

## 2023-03-08 PROCEDURE — 1090F PRES/ABSN URINE INCON ASSESS: CPT | Performed by: FAMILY MEDICINE

## 2023-03-08 PROCEDURE — G8417 CALC BMI ABV UP PARAM F/U: HCPCS | Performed by: FAMILY MEDICINE

## 2023-03-08 SDOH — ECONOMIC STABILITY: INCOME INSECURITY: HOW HARD IS IT FOR YOU TO PAY FOR THE VERY BASICS LIKE FOOD, HOUSING, MEDICAL CARE, AND HEATING?: SOMEWHAT HARD

## 2023-03-08 SDOH — ECONOMIC STABILITY: HOUSING INSECURITY
IN THE LAST 12 MONTHS, WAS THERE A TIME WHEN YOU DID NOT HAVE A STEADY PLACE TO SLEEP OR SLEPT IN A SHELTER (INCLUDING NOW)?: NO

## 2023-03-08 SDOH — ECONOMIC STABILITY: FOOD INSECURITY: WITHIN THE PAST 12 MONTHS, THE FOOD YOU BOUGHT JUST DIDN'T LAST AND YOU DIDN'T HAVE MONEY TO GET MORE.: NEVER TRUE

## 2023-03-08 SDOH — ECONOMIC STABILITY: FOOD INSECURITY: WITHIN THE PAST 12 MONTHS, YOU WORRIED THAT YOUR FOOD WOULD RUN OUT BEFORE YOU GOT MONEY TO BUY MORE.: NEVER TRUE

## 2023-03-08 ASSESSMENT — ENCOUNTER SYMPTOMS
SHORTNESS OF BREATH: 0
COUGH: 0

## 2023-03-08 ASSESSMENT — PATIENT HEALTH QUESTIONNAIRE - PHQ9
SUM OF ALL RESPONSES TO PHQ QUESTIONS 1-9: 5
7. TROUBLE CONCENTRATING ON THINGS, SUCH AS READING THE NEWSPAPER OR WATCHING TELEVISION: 0
SUM OF ALL RESPONSES TO PHQ QUESTIONS 1-9: 5
SUM OF ALL RESPONSES TO PHQ9 QUESTIONS 1 & 2: 1
SUM OF ALL RESPONSES TO PHQ QUESTIONS 1-9: 5
6. FEELING BAD ABOUT YOURSELF - OR THAT YOU ARE A FAILURE OR HAVE LET YOURSELF OR YOUR FAMILY DOWN: 0
1. LITTLE INTEREST OR PLEASURE IN DOING THINGS: 0
3. TROUBLE FALLING OR STAYING ASLEEP: 3
8. MOVING OR SPEAKING SO SLOWLY THAT OTHER PEOPLE COULD HAVE NOTICED. OR THE OPPOSITE, BEING SO FIGETY OR RESTLESS THAT YOU HAVE BEEN MOVING AROUND A LOT MORE THAN USUAL: 0
9. THOUGHTS THAT YOU WOULD BE BETTER OFF DEAD, OR OF HURTING YOURSELF: 0
SUM OF ALL RESPONSES TO PHQ QUESTIONS 1-9: 5
2. FEELING DOWN, DEPRESSED OR HOPELESS: 1
5. POOR APPETITE OR OVEREATING: 0
4. FEELING TIRED OR HAVING LITTLE ENERGY: 1

## 2023-03-08 NOTE — PROGRESS NOTES
Ashleigh Carl (: 1945) is a 78 y.o. female, established patient, here for evaluation of the following chief complaint(s):  3 Month Follow-Up       ASSESSMENT/PLAN:  1. Essential (primary) hypertension  -     metoprolol tartrate (LOPRESSOR) 25 MG tablet; Take 1 tablet by mouth 2 times daily, Disp-180 tablet, R-3Normal  2. COPD with asthma ([facility] [address]  3. Bipolar disorder, currently in remission, most recent episode unspecified ([facility] [address]  4. Mild cognitive impairment  -     Lead, Blood; Future    Blood pressure at goal today for age, will continue current regiment. Reviewed recent blood work. Patient follows with pulmonology for COPD and asthma, follows with neurology as well as psychiatry for cognitive Gabriel as well as bipolar. Patient concerned about lead exposure will check lead levels in her blood today for reassurance. Return for next scheduled appt. SUBJECTIVE/OBJECTIVE:  HPI  72-year-old female with mild cognitive impairment, bipolar, hypertension who presents for follow-up. She has multiple complaints today, mostly related to orthopedic issues. She said she called orthopedics wanting an injection of her hip and they told her it was too early. She also called and said she had some knee pain. They recommended an x-ray which she declined. She is concerned about possible lead exposure because her home was built in the 1960s. She would like this checked today. She needs refills on her blood pressure medicine, she has had no issues with this. Review of Systems   Constitutional:  Negative for activity change, appetite change, fever and unexpected weight change. Respiratory:  Negative for cough and shortness of breath. Cardiovascular:  Negative for chest pain and palpitations. Skin:  Negative for rash. Neurological:  Negative for dizziness and headaches. Psychiatric/Behavioral:  Negative for sleep disturbance.       Vitals:    23 1257   BP: (!) 146/90   Pulse: 81   Resp: 18   Temp: 97.1 °F (36.2 °C)   SpO2: 97%       Physical Exam  Vitals reviewed. Constitutional:       General: She is not in acute distress. Appearance: Normal appearance. She is not ill-appearing or toxic-appearing. HENT:      Head: Normocephalic and atraumatic. Eyes:      Conjunctiva/sclera: Conjunctivae normal.   Cardiovascular:      Rate and Rhythm: Normal rate and regular rhythm. Heart sounds: Normal heart sounds. No murmur heard. No friction rub. No gallop. Pulmonary:      Effort: Pulmonary effort is normal. No respiratory distress. Breath sounds: Normal breath sounds. No wheezing, rhonchi or rales. Musculoskeletal:         General: No swelling. Normal range of motion. Skin:     General: Skin is warm. Findings: No rash. Neurological:      Mental Status: She is alert. Mental status is at baseline. Psychiatric:         Mood and Affect: Mood normal.          On this date, I spent 40 minutes reviewing previous notes, test results and face to face with the patient discussing the diagnosis and importance of compliance with the treatment plan as well as documenting on the day of the visit. An electronic signature was used to authenticate this note.   -- Jamie Whalen MD

## 2023-03-10 LAB
HISPANIC?: NO
LEAD BLD-MCNC: <1 UG/DL (ref 0–3.4)
RACE: NORMAL
SPECIMEN SOURCE: NORMAL
TEST PURPOSE: NORMAL

## 2023-03-13 ENCOUNTER — TELEPHONE (OUTPATIENT)
Dept: FAMILY MEDICINE CLINIC | Facility: CLINIC | Age: 78
End: 2023-03-13

## 2023-03-13 NOTE — TELEPHONE ENCOUNTER
Contacted patient. Advised of results and provider comments. Verbalized understanding. No questions.      ROB MEZA

## 2023-03-13 NOTE — TELEPHONE ENCOUNTER
----- Message from Jinny Srivastava MD sent at 3/10/2023 12:33 PM EST -----  Please call patient and let them know her lead level was normal. THanks

## 2023-03-29 ENCOUNTER — APPOINTMENT (RX ONLY)
Dept: URBAN - METROPOLITAN AREA CLINIC 330 | Facility: CLINIC | Age: 78
Setting detail: DERMATOLOGY
End: 2023-03-29

## 2023-03-29 DIAGNOSIS — M71 OTHER BURSOPATHIES: ICD-10-CM

## 2023-03-29 DIAGNOSIS — L82.0 INFLAMED SEBORRHEIC KERATOSIS: ICD-10-CM

## 2023-03-29 DIAGNOSIS — L21.8 OTHER SEBORRHEIC DERMATITIS: ICD-10-CM | Status: WELL CONTROLLED

## 2023-03-29 PROBLEM — M71.371 OTHER BURSAL CYST, RIGHT ANKLE AND FOOT: Status: ACTIVE | Noted: 2023-03-29

## 2023-03-29 PROBLEM — D48.5 NEOPLASM OF UNCERTAIN BEHAVIOR OF SKIN: Status: ACTIVE | Noted: 2023-03-29

## 2023-03-29 PROCEDURE — ? BIOPSY BY SHAVE METHOD

## 2023-03-29 PROCEDURE — ? FULL BODY SKIN EXAM - DECLINED

## 2023-03-29 PROCEDURE — ? PRESCRIPTION MEDICATION MANAGEMENT

## 2023-03-29 PROCEDURE — ? COUNSELING

## 2023-03-29 PROCEDURE — 11102 TANGNTL BX SKIN SINGLE LES: CPT

## 2023-03-29 PROCEDURE — 99213 OFFICE O/P EST LOW 20 MIN: CPT | Mod: 25

## 2023-03-29 ASSESSMENT — LOCATION SIMPLE DESCRIPTION DERM
LOCATION SIMPLE: RIGHT 4TH TOE
LOCATION SIMPLE: LEFT HAND
LOCATION SIMPLE: LEFT CHEEK

## 2023-03-29 ASSESSMENT — LOCATION DETAILED DESCRIPTION DERM
LOCATION DETAILED: LEFT INFERIOR MEDIAL MALAR CHEEK
LOCATION DETAILED: RIGHT DORSAL 4TH TOE
LOCATION DETAILED: LEFT RADIAL DORSAL HAND

## 2023-03-29 ASSESSMENT — LOCATION ZONE DERM
LOCATION ZONE: FACE
LOCATION ZONE: HAND
LOCATION ZONE: TOE

## 2023-03-29 NOTE — PROCEDURE: PRESCRIPTION MEDICATION MANAGEMENT
Continue Regimen: Ketoconazole cream bid to face for two weeks then 1-2x weekly for maintenance\\n\\nKetoconazole shampoo wash scalp daily prn (rx from Dr. Topete)
Render In Strict Bullet Format?: No
Detail Level: Zone

## 2023-03-29 NOTE — PROCEDURE: FULL BODY SKIN EXAM - DECLINED
Diabetes is uncontrolled. Lipids are very high. We will discuss improved management plan at your upcoming office visit. Bring in your glucometer to check accuracy. Keep track of your meals in a log book or phone bridger as well.
Detail Level: Simple
Instructions: This plan will send the code FBSD to the PM system.  DO NOT or CHANGE the price.
Price (Do Not Change): 0.00

## 2023-04-24 ENCOUNTER — TELEPHONE (OUTPATIENT)
Dept: FAMILY MEDICINE CLINIC | Facility: CLINIC | Age: 78
End: 2023-04-24

## 2023-04-24 NOTE — TELEPHONE ENCOUNTER
Patient stated that she was bitten by a red spider last night. Her hand is swollen, this makes it hard for her to open the refridgerator door. Patient informed to go to .

## 2023-05-24 ENCOUNTER — HOME HEALTH ADMISSION (OUTPATIENT)
Dept: HOME HEALTH SERVICES | Facility: HOME HEALTH | Age: 78
End: 2023-05-24

## 2023-05-26 ENCOUNTER — NURSE ONLY (OUTPATIENT)
Dept: FAMILY MEDICINE CLINIC | Facility: CLINIC | Age: 78
End: 2023-05-26

## 2023-05-26 ENCOUNTER — TELEPHONE (OUTPATIENT)
Dept: FAMILY MEDICINE CLINIC | Facility: CLINIC | Age: 78
End: 2023-05-26

## 2023-05-26 DIAGNOSIS — Z13.220 SCREENING CHOLESTEROL LEVEL: ICD-10-CM

## 2023-05-26 DIAGNOSIS — I10 ESSENTIAL (PRIMARY) HYPERTENSION: ICD-10-CM

## 2023-05-26 DIAGNOSIS — Z13.1 DIABETES MELLITUS SCREENING: ICD-10-CM

## 2023-05-26 DIAGNOSIS — L29.9 SCALP PRURITUS: Primary | ICD-10-CM

## 2023-05-26 DIAGNOSIS — R53.83 OTHER FATIGUE: ICD-10-CM

## 2023-05-26 LAB
BASOPHILS # BLD: 0.1 K/UL (ref 0–0.2)
BASOPHILS NFR BLD: 1 % (ref 0–2)
DIFFERENTIAL METHOD BLD: ABNORMAL
EOSINOPHIL # BLD: 0.1 K/UL (ref 0–0.8)
EOSINOPHIL NFR BLD: 1 % (ref 0.5–7.8)
ERYTHROCYTE [DISTWIDTH] IN BLOOD BY AUTOMATED COUNT: 11.8 % (ref 11.9–14.6)
HCT VFR BLD AUTO: 40 % (ref 35.8–46.3)
HGB BLD-MCNC: 14 G/DL (ref 11.7–15.4)
IMM GRANULOCYTES # BLD AUTO: 0.1 K/UL (ref 0–0.5)
IMM GRANULOCYTES NFR BLD AUTO: 1 % (ref 0–5)
LYMPHOCYTES # BLD: 1.4 K/UL (ref 0.5–4.6)
LYMPHOCYTES NFR BLD: 18 % (ref 13–44)
MCH RBC QN AUTO: 33.2 PG (ref 26.1–32.9)
MCHC RBC AUTO-ENTMCNC: 35 G/DL (ref 31.4–35)
MCV RBC AUTO: 94.8 FL (ref 82–102)
MONOCYTES # BLD: 0.5 K/UL (ref 0.1–1.3)
MONOCYTES NFR BLD: 6 % (ref 4–12)
NEUTS SEG # BLD: 5.8 K/UL (ref 1.7–8.2)
NEUTS SEG NFR BLD: 73 % (ref 43–78)
NRBC # BLD: 0 K/UL (ref 0–0.2)
PLATELET # BLD AUTO: 216 K/UL (ref 150–450)
PMV BLD AUTO: 8.4 FL (ref 9.4–12.3)
RBC # BLD AUTO: 4.22 M/UL (ref 4.05–5.2)
WBC # BLD AUTO: 7.9 K/UL (ref 4.3–11.1)

## 2023-05-27 LAB
ALBUMIN SERPL-MCNC: 3.8 G/DL (ref 3.2–4.6)
ALBUMIN/GLOB SERPL: 1.4 (ref 0.4–1.6)
ALP SERPL-CCNC: 107 U/L (ref 50–136)
ALT SERPL-CCNC: 26 U/L (ref 12–65)
ANION GAP SERPL CALC-SCNC: 8 MMOL/L (ref 2–11)
AST SERPL-CCNC: 15 U/L (ref 15–37)
BILIRUB SERPL-MCNC: 0.4 MG/DL (ref 0.2–1.1)
BUN SERPL-MCNC: 12 MG/DL (ref 8–23)
CALCIUM SERPL-MCNC: 9.7 MG/DL (ref 8.3–10.4)
CHLORIDE SERPL-SCNC: 103 MMOL/L (ref 101–110)
CHOLEST SERPL-MCNC: 227 MG/DL
CO2 SERPL-SCNC: 25 MMOL/L (ref 21–32)
CREAT SERPL-MCNC: 0.7 MG/DL (ref 0.6–1)
EST. AVERAGE GLUCOSE BLD GHB EST-MCNC: 100 MG/DL
GLOBULIN SER CALC-MCNC: 2.7 G/DL (ref 2.8–4.5)
GLUCOSE SERPL-MCNC: 93 MG/DL (ref 65–100)
HBA1C MFR BLD: 5.1 % (ref 4.8–5.6)
HDLC SERPL-MCNC: 52 MG/DL (ref 40–60)
HDLC SERPL: 4.4
LDLC SERPL CALC-MCNC: 147 MG/DL
POTASSIUM SERPL-SCNC: 4 MMOL/L (ref 3.5–5.1)
PROT SERPL-MCNC: 6.5 G/DL (ref 6.3–8.2)
SODIUM SERPL-SCNC: 136 MMOL/L (ref 133–143)
TRIGL SERPL-MCNC: 140 MG/DL (ref 35–150)
TSH, 3RD GENERATION: 2.3 UIU/ML (ref 0.36–3.74)
VLDLC SERPL CALC-MCNC: 28 MG/DL (ref 6–23)

## 2023-05-30 RX ORDER — BETAMETHASONE DIPROPIONATE 0.5 MG/G
CREAM TOPICAL
Qty: 1 EACH | Refills: 0 | Status: SHIPPED | OUTPATIENT
Start: 2023-05-30

## 2023-06-07 ENCOUNTER — HOME HEALTH ADMISSION (OUTPATIENT)
Dept: HOME HEALTH SERVICES | Facility: HOME HEALTH | Age: 78
End: 2023-06-07
Payer: MEDICARE

## 2023-06-07 ENCOUNTER — TELEPHONE (OUTPATIENT)
Dept: FAMILY MEDICINE CLINIC | Facility: CLINIC | Age: 78
End: 2023-06-07

## 2023-06-07 ENCOUNTER — OFFICE VISIT (OUTPATIENT)
Dept: FAMILY MEDICINE CLINIC | Facility: CLINIC | Age: 78
End: 2023-06-07
Payer: MEDICARE

## 2023-06-07 VITALS
HEIGHT: 63 IN | TEMPERATURE: 97.9 F | SYSTOLIC BLOOD PRESSURE: 130 MMHG | DIASTOLIC BLOOD PRESSURE: 78 MMHG | BODY MASS INDEX: 25.23 KG/M2 | HEART RATE: 85 BPM | WEIGHT: 142.38 LBS | OXYGEN SATURATION: 94 %

## 2023-06-07 DIAGNOSIS — G31.84 MILD COGNITIVE IMPAIRMENT: ICD-10-CM

## 2023-06-07 DIAGNOSIS — G89.29 CHRONIC LOW BACK PAIN, UNSPECIFIED BACK PAIN LATERALITY, UNSPECIFIED WHETHER SCIATICA PRESENT: ICD-10-CM

## 2023-06-07 DIAGNOSIS — F31.70 BIPOLAR AFFECTIVE DISORDER IN REMISSION (HCC): Primary | ICD-10-CM

## 2023-06-07 DIAGNOSIS — R29.6 FREQUENT FALLS: ICD-10-CM

## 2023-06-07 DIAGNOSIS — M54.50 CHRONIC LOW BACK PAIN, UNSPECIFIED BACK PAIN LATERALITY, UNSPECIFIED WHETHER SCIATICA PRESENT: ICD-10-CM

## 2023-06-07 DIAGNOSIS — I10 ESSENTIAL (PRIMARY) HYPERTENSION: ICD-10-CM

## 2023-06-07 PROCEDURE — G8400 PT W/DXA NO RESULTS DOC: HCPCS | Performed by: FAMILY MEDICINE

## 2023-06-07 PROCEDURE — G8427 DOCREV CUR MEDS BY ELIG CLIN: HCPCS | Performed by: FAMILY MEDICINE

## 2023-06-07 PROCEDURE — 1090F PRES/ABSN URINE INCON ASSESS: CPT | Performed by: FAMILY MEDICINE

## 2023-06-07 PROCEDURE — G8417 CALC BMI ABV UP PARAM F/U: HCPCS | Performed by: FAMILY MEDICINE

## 2023-06-07 PROCEDURE — 3017F COLORECTAL CA SCREEN DOC REV: CPT | Performed by: FAMILY MEDICINE

## 2023-06-07 PROCEDURE — 1036F TOBACCO NON-USER: CPT | Performed by: FAMILY MEDICINE

## 2023-06-07 PROCEDURE — 1123F ACP DISCUSS/DSCN MKR DOCD: CPT | Performed by: FAMILY MEDICINE

## 2023-06-07 PROCEDURE — 3075F SYST BP GE 130 - 139MM HG: CPT | Performed by: FAMILY MEDICINE

## 2023-06-07 PROCEDURE — 3078F DIAST BP <80 MM HG: CPT | Performed by: FAMILY MEDICINE

## 2023-06-07 PROCEDURE — 99215 OFFICE O/P EST HI 40 MIN: CPT | Performed by: FAMILY MEDICINE

## 2023-06-07 ASSESSMENT — PATIENT HEALTH QUESTIONNAIRE - PHQ9
SUM OF ALL RESPONSES TO PHQ QUESTIONS 1-9: 0
10. IF YOU CHECKED OFF ANY PROBLEMS, HOW DIFFICULT HAVE THESE PROBLEMS MADE IT FOR YOU TO DO YOUR WORK, TAKE CARE OF THINGS AT HOME, OR GET ALONG WITH OTHER PEOPLE: 1
3. TROUBLE FALLING OR STAYING ASLEEP: 0
6. FEELING BAD ABOUT YOURSELF - OR THAT YOU ARE A FAILURE OR HAVE LET YOURSELF OR YOUR FAMILY DOWN: 0
2. FEELING DOWN, DEPRESSED OR HOPELESS: 0
7. TROUBLE CONCENTRATING ON THINGS, SUCH AS READING THE NEWSPAPER OR WATCHING TELEVISION: 0
SUM OF ALL RESPONSES TO PHQ QUESTIONS 1-9: 0
4. FEELING TIRED OR HAVING LITTLE ENERGY: 0
9. THOUGHTS THAT YOU WOULD BE BETTER OFF DEAD, OR OF HURTING YOURSELF: 0
8. MOVING OR SPEAKING SO SLOWLY THAT OTHER PEOPLE COULD HAVE NOTICED. OR THE OPPOSITE, BEING SO FIGETY OR RESTLESS THAT YOU HAVE BEEN MOVING AROUND A LOT MORE THAN USUAL: 0
SUM OF ALL RESPONSES TO PHQ QUESTIONS 1-9: 0
5. POOR APPETITE OR OVEREATING: 0
SUM OF ALL RESPONSES TO PHQ QUESTIONS 1-9: 0

## 2023-06-07 ASSESSMENT — ENCOUNTER SYMPTOMS
COUGH: 0
SHORTNESS OF BREATH: 0

## 2023-06-07 NOTE — TELEPHONE ENCOUNTER
Home Health called stating the diagnosis's placed on he referral will not work. Falling and mild Cognitive Impairment. Must be something like COPD, chronic pain.

## 2023-06-07 NOTE — PROGRESS NOTES
toxic-appearing. HENT:      Head: Normocephalic and atraumatic. Eyes:      Conjunctiva/sclera: Conjunctivae normal.   Cardiovascular:      Rate and Rhythm: Normal rate and regular rhythm. Heart sounds: Normal heart sounds. No murmur heard. No friction rub. No gallop. Pulmonary:      Effort: Pulmonary effort is normal. No respiratory distress. Breath sounds: Normal breath sounds. No wheezing, rhonchi or rales. Musculoskeletal:         General: No swelling. Normal range of motion. Skin:     General: Skin is warm. Findings: No rash. Neurological:      Mental Status: She is alert. Mental status is at baseline. Psychiatric:         Mood and Affect: Mood normal.          On this date, I spent 55 minutes reviewing previous notes, test results and face to face with the patient discussing the diagnosis and importance of compliance with the treatment plan as well as documenting on the day of the visit. An electronic signature was used to authenticate this note.   -- Rubén Melgoza MD

## 2023-06-14 PROCEDURE — 0221000100 HH NO PAY CLAIM PROCEDURE

## 2023-06-19 ENCOUNTER — HOME CARE VISIT (OUTPATIENT)
Dept: SCHEDULING | Facility: HOME HEALTH | Age: 78
End: 2023-06-19

## 2023-06-19 PROCEDURE — G0155 HHCP-SVS OF CSW,EA 15 MIN: HCPCS

## 2023-06-20 ENCOUNTER — HOME CARE VISIT (OUTPATIENT)
Dept: SCHEDULING | Facility: HOME HEALTH | Age: 78
End: 2023-06-20

## 2023-06-20 VITALS
OXYGEN SATURATION: 98 % | TEMPERATURE: 97.5 F | HEART RATE: 80 BPM | DIASTOLIC BLOOD PRESSURE: 68 MMHG | SYSTOLIC BLOOD PRESSURE: 122 MMHG | RESPIRATION RATE: 16 BRPM

## 2023-06-20 PROCEDURE — G0157 HHC PT ASSISTANT EA 15: HCPCS

## 2023-06-20 ASSESSMENT — ENCOUNTER SYMPTOMS: PAIN LOCATION - PAIN QUALITY: SORE.ACHES

## 2023-06-23 ENCOUNTER — HOME CARE VISIT (OUTPATIENT)
Dept: SCHEDULING | Facility: HOME HEALTH | Age: 78
End: 2023-06-23

## 2023-06-23 VITALS
OXYGEN SATURATION: 98 % | TEMPERATURE: 97.2 F | DIASTOLIC BLOOD PRESSURE: 64 MMHG | RESPIRATION RATE: 16 BRPM | SYSTOLIC BLOOD PRESSURE: 122 MMHG | HEART RATE: 80 BPM

## 2023-06-23 PROCEDURE — G0157 HHC PT ASSISTANT EA 15: HCPCS

## 2023-06-23 ASSESSMENT — ENCOUNTER SYMPTOMS: PAIN LOCATION - PAIN QUALITY: SORE ACHES

## 2023-06-27 ENCOUNTER — HOME CARE VISIT (OUTPATIENT)
Dept: SCHEDULING | Facility: HOME HEALTH | Age: 78
End: 2023-06-27
Payer: MEDICARE

## 2023-06-27 VITALS
RESPIRATION RATE: 16 BRPM | SYSTOLIC BLOOD PRESSURE: 120 MMHG | DIASTOLIC BLOOD PRESSURE: 62 MMHG | OXYGEN SATURATION: 99 % | TEMPERATURE: 97.3 F | HEART RATE: 75 BPM

## 2023-06-27 PROCEDURE — G0157 HHC PT ASSISTANT EA 15: HCPCS

## 2023-06-27 ASSESSMENT — ENCOUNTER SYMPTOMS: PAIN LOCATION - PAIN QUALITY: SORE.ACHES

## 2023-06-28 ENCOUNTER — TELEPHONE (OUTPATIENT)
Dept: FAMILY MEDICINE CLINIC | Facility: CLINIC | Age: 78
End: 2023-06-28

## 2023-06-28 DIAGNOSIS — F31.70 BIPOLAR AFFECTIVE DISORDER IN REMISSION (HCC): Primary | ICD-10-CM

## 2023-06-30 ENCOUNTER — HOME CARE VISIT (OUTPATIENT)
Dept: HOME HEALTH SERVICES | Facility: HOME HEALTH | Age: 78
End: 2023-06-30
Payer: MEDICARE

## 2023-06-30 ENCOUNTER — HOME CARE VISIT (OUTPATIENT)
Dept: SCHEDULING | Facility: HOME HEALTH | Age: 78
End: 2023-06-30
Payer: MEDICARE

## 2023-06-30 PROCEDURE — G0157 HHC PT ASSISTANT EA 15: HCPCS

## 2023-06-30 RX ORDER — OLANZAPINE 5 MG/1
5 TABLET ORAL DAILY
Qty: 90 TABLET | Refills: 0 | Status: SHIPPED | OUTPATIENT
Start: 2023-06-30

## 2023-07-01 VITALS
DIASTOLIC BLOOD PRESSURE: 70 MMHG | SYSTOLIC BLOOD PRESSURE: 124 MMHG | TEMPERATURE: 98.2 F | RESPIRATION RATE: 16 BRPM | OXYGEN SATURATION: 99 % | HEART RATE: 82 BPM

## 2023-07-05 ENCOUNTER — HOME CARE VISIT (OUTPATIENT)
Dept: SCHEDULING | Facility: HOME HEALTH | Age: 78
End: 2023-07-05
Payer: MEDICARE

## 2023-07-05 PROCEDURE — G0157 HHC PT ASSISTANT EA 15: HCPCS

## 2023-07-06 VITALS
TEMPERATURE: 97.4 F | HEART RATE: 94 BPM | SYSTOLIC BLOOD PRESSURE: 138 MMHG | RESPIRATION RATE: 16 BRPM | OXYGEN SATURATION: 98 % | DIASTOLIC BLOOD PRESSURE: 78 MMHG

## 2023-07-06 ASSESSMENT — ENCOUNTER SYMPTOMS: PAIN LOCATION - PAIN QUALITY: JUST HURTS

## 2023-07-06 NOTE — CASE COMMUNICATION
lidocaine pain relief cream 4% topical as needed for pain  . Clemmie Numbers started 6/30/23 Peyton Jama

## 2023-07-07 ENCOUNTER — HOME CARE VISIT (OUTPATIENT)
Dept: SCHEDULING | Facility: HOME HEALTH | Age: 78
End: 2023-07-07
Payer: MEDICARE

## 2023-07-07 VITALS
SYSTOLIC BLOOD PRESSURE: 104 MMHG | HEART RATE: 76 BPM | TEMPERATURE: 96.9 F | OXYGEN SATURATION: 97 % | DIASTOLIC BLOOD PRESSURE: 64 MMHG | RESPIRATION RATE: 18 BRPM

## 2023-07-07 PROCEDURE — G0151 HHCP-SERV OF PT,EA 15 MIN: HCPCS

## 2023-07-10 RX ORDER — OLANZAPINE 5 MG/1
5 TABLET ORAL DAILY
Qty: 90 TABLET | Refills: 0 | Status: SHIPPED | OUTPATIENT
Start: 2023-07-10

## 2023-07-27 ENCOUNTER — TELEPHONE (OUTPATIENT)
Dept: FAMILY MEDICINE CLINIC | Facility: CLINIC | Age: 78
End: 2023-07-27

## 2023-07-28 DIAGNOSIS — F31.70 BIPOLAR AFFECTIVE DISORDER IN REMISSION (HCC): Primary | ICD-10-CM

## 2023-07-28 RX ORDER — OLANZAPINE 5 MG/1
5 TABLET ORAL DAILY
Qty: 90 TABLET | Refills: 0 | Status: SHIPPED | OUTPATIENT
Start: 2023-07-28

## 2023-07-28 NOTE — TELEPHONE ENCOUNTER
She has not. According to Dr. Odean Mcardle office the pt cant be seen there because [place]. We have also tried [facility], as the pt can self ref. The pt has specific standards for which psychiatrist she will see, and we have communicated with her that she has to tell us who she wants the ref sent to.

## 2023-07-28 NOTE — TELEPHONE ENCOUNTER
Rx sent in for 90 days. I see that Dr. Autumn Warren has been working on trying to get her in w/ Psychiatry. (Last referral was supposed to be w/  Dr. Unruly Mcfarland (per pt request) in [facility]. Please ask pt if she's been scheduled to see Dr. Unruly Mcfarland yet.

## 2023-08-09 ENCOUNTER — HOSPITAL ENCOUNTER (OUTPATIENT)
Dept: ULTRASOUND IMAGING | Age: 78
Discharge: HOME | End: 2023-08-12
Attending: OTOLARYNGOLOGY
Payer: MEDICARE

## 2023-08-09 DIAGNOSIS — E04.1 NONTOXIC SINGLE THYROID NODULE: ICD-10-CM

## 2023-08-09 PROCEDURE — 76536 US EXAM OF HEAD AND NECK: CPT

## 2023-08-10 PROBLEM — J45.40 MODERATE PERSISTENT ASTHMA WITHOUT COMPLICATION: Status: ACTIVE | Noted: 2021-05-27

## 2023-08-14 DIAGNOSIS — E04.2 MULTINODULAR GOITER: Primary | ICD-10-CM

## 2023-08-14 NOTE — PROGRESS NOTES
ASHLIE Wiley is a 78 y.o. female seen for annual GYN exam.  She has urinary urgency. She has some pain in the right breast.    Past Medical History, Past Surgical History, Family history, Social History, and Medications were all reviewed with the patient today and updated as necessary. Current Outpatient Medications   Medication Sig    OLANZapine (ZYPREXA) 5 MG tablet Take 1 tablet by mouth daily Take 5 mg ( one tablet) in the am and 10 mg (2 tablets) at night. metoprolol tartrate (LOPRESSOR) 25 MG tablet Take 1 tablet by mouth 2 times daily    NONFORMULARY Take 2 capsules by mouth daily. PQQ (pyrroloquinoline quinone disodium salt) 2 capsules by mouth daily    acetaminophen (TYLENOL) 500 MG tablet Take 1 tablet by mouth every 6 hours as needed for Pain    Magnesium 500 MG TABS Take 500 mg by mouth daily. VITAMIN A PO Take 10,000 Units by mouth daily. Bioflavonoid Products (FARIDA C PO) Take 1 tablet by mouth daily. budesonide-formoterol (SYMBICORT) 80-4.5 MCG/ACT AERO Inhale 2 puffs into the lungs 2 times daily as needed (shortness of breath)    NONFORMULARY Take 500 mg by mouth daily. Epicor. Takes 2-3 tabs. NONFORMULARY Take 1 tablet by mouth daily. ATP Co-factor. Orange Peel (D-LIMONENE PO) Take 1 tablet by mouth daily.     ketoconazole (NIZORAL) 2 % cream APPLY TWICE DAILY FOR TWO WEEKS THEN 1-2 TIMES WEEKLY FOR MAINTENANCE    DHEA 50 MG CAPS Take 100 mg by mouth every morning    Coenzyme Q10 (COQ10 PO) Take 1 tablet by mouth 2 times daily    CPAP Machine MISC by Other route    Iodine, Kelp, 0.15 MG TABS Take 12.5 mg by mouth daily    Lactobacillus Acidophilus POWD Take 2 capsules by mouth daily    Polyethylene Glycol 1000 LIQD Take 1 Dose by mouth daily as needed (constipation)    Pregnenolone 50 MG TABS Take 100 mg by mouth every morning    Zinc Acetate, Oral, (ZINC ACETATE PO) Take 30 mg by mouth daily    albuterol sulfate  (90 Base) MCG/ACT inhaler Inhale 2 puffs into

## 2023-08-15 ENCOUNTER — TELEPHONE (OUTPATIENT)
Dept: FAMILY MEDICINE CLINIC | Facility: CLINIC | Age: 78
End: 2023-08-15

## 2023-08-15 NOTE — TELEPHONE ENCOUNTER
----- Message from Sarmad Rutledge, [address] [place] [address] sent at 8/14/2023  6:21 PM EDT -----  She needs to see an endocrinologist at this time due to multiple thyroid nodules and some of these moderately suspicious. I am placing a referral for her.

## 2023-08-16 ENCOUNTER — OFFICE VISIT (OUTPATIENT)
Dept: OBGYN CLINIC | Age: 78
End: 2023-08-16
Payer: MEDICARE

## 2023-08-16 VITALS
BODY MASS INDEX: 27.88 KG/M2 | HEIGHT: 60 IN | SYSTOLIC BLOOD PRESSURE: 160 MMHG | DIASTOLIC BLOOD PRESSURE: 80 MMHG | WEIGHT: 142 LBS

## 2023-08-16 DIAGNOSIS — Z12.31 VISIT FOR SCREENING MAMMOGRAM: ICD-10-CM

## 2023-08-16 DIAGNOSIS — R39.15 URINARY URGENCY: ICD-10-CM

## 2023-08-16 DIAGNOSIS — Z12.4 SCREENING FOR MALIGNANT NEOPLASM OF CERVIX: ICD-10-CM

## 2023-08-16 DIAGNOSIS — Z01.419 WELL WOMAN EXAM: Primary | ICD-10-CM

## 2023-08-16 LAB
BILIRUBIN, URINE, POC: NEGATIVE
BLOOD URINE, POC: NEGATIVE
GLUCOSE URINE, POC: NEGATIVE
KETONES, URINE, POC: NEGATIVE
LEUKOCYTE ESTERASE, URINE, POC: NEGATIVE
NITRITE, URINE, POC: NEGATIVE
PH, URINE, POC: 7 (ref 4.6–8)
PROTEIN,URINE, POC: NEGATIVE
SPECIFIC GRAVITY, URINE, POC: 1.01 (ref 1–1.03)
URINALYSIS CLARITY, POC: CLEAR
URINALYSIS COLOR, POC: YELLOW
UROBILINOGEN, POC: NORMAL

## 2023-08-16 PROCEDURE — 3077F SYST BP >= 140 MM HG: CPT | Performed by: OBSTETRICS & GYNECOLOGY

## 2023-08-16 PROCEDURE — 3079F DIAST BP 80-89 MM HG: CPT | Performed by: OBSTETRICS & GYNECOLOGY

## 2023-08-16 PROCEDURE — G8417 CALC BMI ABV UP PARAM F/U: HCPCS | Performed by: OBSTETRICS & GYNECOLOGY

## 2023-08-16 PROCEDURE — 81003 URINALYSIS AUTO W/O SCOPE: CPT | Performed by: OBSTETRICS & GYNECOLOGY

## 2023-08-16 PROCEDURE — G0101 CA SCREEN;PELVIC/BREAST EXAM: HCPCS | Performed by: OBSTETRICS & GYNECOLOGY

## 2023-08-16 PROCEDURE — G8427 DOCREV CUR MEDS BY ELIG CLIN: HCPCS | Performed by: OBSTETRICS & GYNECOLOGY

## 2023-08-21 LAB
CYTOLOGIST CVX/VAG CYTO: NORMAL
CYTOLOGY CVX/VAG DOC THIN PREP: NORMAL
HPV REFLEX: NORMAL
Lab: NORMAL
PATH REPORT.FINAL DX SPEC: NORMAL
STAT OF ADQ CVX/VAG CYTO-IMP: NORMAL

## 2023-08-23 ENCOUNTER — HOSPITAL ENCOUNTER (OUTPATIENT)
Dept: PHYSICAL THERAPY | Age: 78
Setting detail: RECURRING SERIES
Discharge: HOME | End: 2023-08-26
Payer: MEDICARE

## 2023-08-23 PROCEDURE — 97162 PT EVAL MOD COMPLEX 30 MIN: CPT

## 2023-08-23 PROCEDURE — 97110 THERAPEUTIC EXERCISES: CPT

## 2023-08-23 ASSESSMENT — PAIN SCALES - GENERAL: PAINLEVEL_OUTOF10: 4

## 2023-08-23 ASSESSMENT — PAIN DESCRIPTION - LOCATION: LOCATION: HIP

## 2023-08-23 ASSESSMENT — PAIN DESCRIPTION - ORIENTATION: ORIENTATION: RIGHT;LEFT

## 2023-08-23 NOTE — THERAPY EVALUATION
Kendrick Jones  : 1945  Primary: Medicare Part A And B (Medicare)  Secondary:   [facility] [address] [address]  Phone: [phone]  Fax: [phone] Plan Frequency: 1 time per week for 8-12 weeks    Plan of Care/Certification Expiration Date: 23      PT Visit Info:   Plan Frequency: 1 time per week for 8-12 weeks  Plan of Care/Certification Expiration Date: 23  Total # of Visits to Date: 1  Progress Note Counter: 1  Progress Note Due Date: 23      Visit Count:  1                OUTPATIENT PHYSICAL THERAPY:             OP NOTE TYPE: Initial Assessment 2023               Episode (B hip OA) Appt Desk         Treatment Diagnosis:  Pain in left hip (M25.552)  Stiffness of Left Hip, Not elsewhere classified (M25.652)  Pain in Right Hip (M25.551)  Stiffness of Right Hip, Not elsewhere classified (M25.651)  Difficulty in walking, Not elsewhere classified (R26.2)  Other abnormalities of gait and mobility (R26.89)  Generalized Muscle Weakness (M62.81)  History of falling (Z91.81)  Medical/Referring Diagnosis:  No admission diagnoses are documented for this encounter. M16.0 (ICD-10-CM) - Bilateral primary osteoarthritis of hip  Referring Physician:  John Conteh PA-C MD Orders:  PT Eval and Treat   Return MD Appt:    Date of Onset:  Onset Date:  (chronic)      Allergies:  Latex, Articaine, Donepezil, Epinephrine, Erythromycin, Lexapro [escitalopram], Penicillins, Cephalexin, Chlorzoxazone, Clindamycin, Gabapentin, and Tramadol  Restrictions/Precautions:  history of falls         Medications Last Reviewed:  2023     SUBJECTIVE   History of Injury/Illness (Reason for Referral): Had cortisone injection L hip recently, 2023. Did not do R hip. Use UV pad on R hip and \"relief factor\" supplements. See chiropractor regularly. Has had falls, last one a few months ago.     Hips hurt, stiff and hurting after

## 2023-08-23 NOTE — PROGRESS NOTES
Chioma Born  : 1945  Primary: Medicare Part A And B (Medicare)  Secondary:  [facility] [place] [address]  [address] [facility] [address]  Phone: [phone]  Fax: [phone] Plan Frequency: 1 time per week for 8-12 weeks    Plan of Care/Certification Expiration Date: 23      >PT Visit Info:  Plan Frequency: 1 time per week for 8-12 weeks  Plan of Care/Certification Expiration Date: 23  Total # of Visits to Date: 1  Progress Note Counter: 1  Progress Note Due Date: 23      Visit Count:  1    OUTPATIENT PHYSICAL THERAPY:OP NOTE TYPE: OP Note Type: Treatment Note 2023       Episode  }Appt Desk             Treatment Diagnosis:  Pain in left hip (M25.552)  Stiffness of Left Hip, Not elsewhere classified (M25.652)  Pain in Right Hip (M25.551)  Stiffness of Right Hip, Not elsewhere classified (M25.651)  Difficulty in walking, Not elsewhere classified (R26.2)  Other abnormalities of gait and mobility (R26.89)  Generalized Muscle Weakness (M62.81)  History of falling (Z91.81)  Medical/Referring Diagnosis:  No admission diagnoses are documented for this encounter. M16.0 (ICD-10-CM) - Bilateral primary osteoarthritis of hip  Referring Physician:  Mortimer Gails, PA-C MD Orders:  PT Eval and Treat   Date of Onset:  Onset Date:  (chronic)     Allergies:   Latex, Articaine, Donepezil, Epinephrine, Erythromycin, Lexapro [escitalopram], Penicillins, Cephalexin, Chlorzoxazone, Clindamycin, Gabapentin, and Tramadol  Restrictions/Precautions:  No data recordedNo data recorded     Interventions Planned (Treatment may consist of any combination of the following):    Current Treatment Recommendations: Strengthening; ROM; Balance training; Functional mobility training; Transfer training; Dry needling; Therapeutic activities; Modalities;  Positioning; Home exercise program; Neuromuscular re-education; Manual       >Subjective Comments:  Hips have been

## 2023-08-29 PROBLEM — J45.40 MODERATE PERSISTENT ASTHMA WITHOUT COMPLICATION: Status: RESOLVED | Noted: 2021-05-27 | Resolved: 2023-08-29

## 2023-08-30 ENCOUNTER — TELEPHONE (OUTPATIENT)
Dept: FAMILY MEDICINE CLINIC | Facility: CLINIC | Age: 78
End: 2023-08-30

## 2023-08-30 DIAGNOSIS — I10 ESSENTIAL (PRIMARY) HYPERTENSION: ICD-10-CM

## 2023-08-30 RX ORDER — MELOXICAM 7.5 MG/1
7.5 TABLET ORAL DAILY
Qty: 30 TABLET | Refills: 1 | Status: SHIPPED | OUTPATIENT
Start: 2023-08-30

## 2023-08-30 RX ORDER — MECLIZINE HCL 12.5 MG/1
12.5 TABLET ORAL 3 TIMES DAILY PRN
Qty: 15 TABLET | Refills: 1 | Status: SHIPPED | OUTPATIENT
Start: 2023-08-30 | End: 2023-09-09

## 2023-08-30 RX ORDER — ALBUTEROL SULFATE 90 UG/1
2 AEROSOL, METERED RESPIRATORY (INHALATION) EVERY 4 HOURS PRN
Qty: 18 G | Refills: 0 | Status: SHIPPED | OUTPATIENT
Start: 2023-08-30

## 2023-08-30 NOTE — TELEPHONE ENCOUNTER
We had to reschedule patient's appointment this morning and could not get her back on the schedule until Oct 30th. Patient said that she is having difficulty getting Rx's transferred from [facility] to [facility]. She is requesting new Rx's for enough Metoprolol,  meclizine,  meloxicam and albuterol be resent to [facility] to  get her through to next appointment.

## 2023-09-01 ENCOUNTER — HOSPITAL ENCOUNTER (OUTPATIENT)
Dept: PHYSICAL THERAPY | Age: 78
Setting detail: RECURRING SERIES
Discharge: HOME | End: 2023-09-04
Payer: MEDICARE

## 2023-09-01 PROCEDURE — 97110 THERAPEUTIC EXERCISES: CPT

## 2023-09-01 ASSESSMENT — PAIN DESCRIPTION - LOCATION: LOCATION: HIP

## 2023-09-01 ASSESSMENT — PAIN DESCRIPTION - ORIENTATION: ORIENTATION: RIGHT;LEFT

## 2023-09-01 ASSESSMENT — PAIN SCALES - GENERAL: PAINLEVEL_OUTOF10: 3

## 2023-09-01 NOTE — PROGRESS NOTES
Carola Roberts  : 1945  Primary: Medicare Part A And B (Medicare)  Secondary:   [place] [place] [address] [address] [facility] [address]  Phone: [phone]  Fax: [phone] Plan Frequency: 1 time per week for 8-12 weeks    Plan of Care/Certification Expiration Date: 23      >PT Visit Info:   Plan Frequency: 1 time per week for 8-12 weeks  Plan of Care/Certification Expiration Date: 23  Total # of Visits to Date: 2  Progress Note Counter: 2  Progress Note Due Date: 23      Visit Count:  2    OUTPATIENT PHYSICAL THERAPY:OP NOTE TYPE: OP Note Type: Treatment Note 2023       Episode  }Appt Desk             Treatment Diagnosis:  Pain in left hip (M25.552)  Stiffness of Left Hip, Not elsewhere classified (M25.652)  Pain in Right Hip (M25.551)  Stiffness of Right Hip, Not elsewhere classified (M25.651)  Difficulty in walking, Not elsewhere classified (R26.2)  Other abnormalities of gait and mobility (R26.89)  Generalized Muscle Weakness (M62.81)  History of falling (Z91.81)  Medical/Referring Diagnosis:  No admission diagnoses are documented for this encounter. M16.0 (ICD-10-CM) - Bilateral primary osteoarthritis of hip  Referring Physician:  Alexander Tadeo PA-C MD Orders:  PT Eval and Treat   Date of Onset:  Onset Date:  (chronic)     Allergies:   Latex, Articaine, Donepezil, Epinephrine, Erythromycin, Lexapro [escitalopram], Penicillins, Cephalexin, Chlorzoxazone, Clindamycin, Gabapentin, and Tramadol  Restrictions/Precautions:  No data recordedNo data recorded     Interventions Planned (Treatment may consist of any combination of the following):    Current Treatment Recommendations: Strengthening; ROM; Balance training; Functional mobility training; Transfer training; Dry needling; Therapeutic activities; Modalities; Positioning; Home exercise program; Neuromuscular re-education; Manual       >Subjective Comments:  I'm okay. Hip is sore.  No

## 2023-09-06 ENCOUNTER — TELEPHONE (OUTPATIENT)
Dept: FAMILY MEDICINE CLINIC | Facility: CLINIC | Age: 78
End: 2023-09-06

## 2023-09-06 DIAGNOSIS — F31.70 BIPOLAR AFFECTIVE DISORDER IN REMISSION (HCC): ICD-10-CM

## 2023-09-06 RX ORDER — OLANZAPINE 5 MG/1
5 TABLET ORAL DAILY
Qty: 90 TABLET | Refills: 0 | Status: SHIPPED | OUTPATIENT
Start: 2023-09-06

## 2023-09-06 NOTE — TELEPHONE ENCOUNTER
Patient requesting Refill for olanzapine 5 mg  Patient stated that she takes 3 tablets at night.   Rx last written 7/28/2023 # 90 with no refills  [facility] that she is still trying to get in with a psychiatrist.

## 2023-09-06 NOTE — TELEPHONE ENCOUNTER
A 30 day supply was sent in b/c we are just trying to give her enough until she can get in to see psychiatry (which was approved w/ Dr. William Vaughn when referral was made in June). Please ask her if she has an appt scheduled w/ them and if not, she can call their office at:  [phone]    Will send in another 30 day supply to cover her.

## 2023-09-06 NOTE — TELEPHONE ENCOUNTER
Information was given to patient regarding 30 day supply of medication pending appt with psychiatrist.

## 2023-09-07 ENCOUNTER — HOSPITAL ENCOUNTER (OUTPATIENT)
Dept: PHYSICAL THERAPY | Age: 78
Setting detail: RECURRING SERIES
Discharge: HOME | End: 2023-09-10
Payer: MEDICARE

## 2023-09-07 PROCEDURE — 97112 NEUROMUSCULAR REEDUCATION: CPT

## 2023-09-07 PROCEDURE — 97110 THERAPEUTIC EXERCISES: CPT

## 2023-09-07 ASSESSMENT — PAIN DESCRIPTION - ORIENTATION: ORIENTATION: RIGHT;LEFT

## 2023-09-07 ASSESSMENT — PAIN SCALES - GENERAL: PAINLEVEL_OUTOF10: 3

## 2023-09-07 ASSESSMENT — PAIN DESCRIPTION - LOCATION: LOCATION: HIP

## 2023-09-07 NOTE — PROGRESS NOTES
Chioma Born  : 1945  Primary: Medicare Part A And B (Medicare)  Secondary:  [facility] [place] [address] [address] [facility] [address]  Phone: [phone]  Fax: [phone] Plan Frequency: 1 time per week for 8-12 weeks    Plan of Care/Certification Expiration Date: 23      >PT Visit Info:   Plan Frequency: 1 time per week for 8-12 weeks  Plan of Care/Certification Expiration Date: 23  Total # of Visits to Date: 3  Progress Note Counter: 3  Progress Note Due Date: 23      Visit Count:  3    OUTPATIENT PHYSICAL THERAPY:OP NOTE TYPE: OP Note Type: Treatment Note 2023       Episode  }Appt Desk             Treatment Diagnosis:  Pain in left hip (M25.552)  Stiffness of Left Hip, Not elsewhere classified (M25.652)  Pain in Right Hip (M25.551)  Stiffness of Right Hip, Not elsewhere classified (M25.651)  Difficulty in walking, Not elsewhere classified (R26.2)  Other abnormalities of gait and mobility (R26.89)  Generalized Muscle Weakness (M62.81)  History of falling (Z91.81)  Medical/Referring Diagnosis:  No admission diagnoses are documented for this encounter. M16.0 (ICD-10-CM) - Bilateral primary osteoarthritis of hip  Referring Physician:  Mortimer Gails, PA-C MD Orders:  PT Eval and Treat   Date of Onset:  Onset Date:  (chronic)     Allergies:   Latex, Articaine, Donepezil, Epinephrine, Erythromycin, Lexapro [escitalopram], Penicillins, Cephalexin, Chlorzoxazone, Clindamycin, Gabapentin, and Tramadol  Restrictions/Precautions:  No data recordedNo data recorded     Interventions Planned (Treatment may consist of any combination of the following):    Current Treatment Recommendations: Strengthening; ROM; Balance training; Functional mobility training; Transfer training; Dry needling; Therapeutic activities; Modalities; Positioning; Home exercise program; Neuromuscular re-education; Manual       >Subjective Comments:   Doing my exercises.  Hip is

## 2023-09-14 ENCOUNTER — HOSPITAL ENCOUNTER (OUTPATIENT)
Dept: PHYSICAL THERAPY | Age: 78
Setting detail: RECURRING SERIES
Discharge: HOME | End: 2023-09-17
Payer: MEDICARE

## 2023-09-14 PROCEDURE — 97110 THERAPEUTIC EXERCISES: CPT

## 2023-09-14 ASSESSMENT — PAIN DESCRIPTION - LOCATION: LOCATION: HIP

## 2023-09-14 ASSESSMENT — PAIN SCALES - GENERAL: PAINLEVEL_OUTOF10: 3

## 2023-09-14 ASSESSMENT — PAIN DESCRIPTION - ORIENTATION: ORIENTATION: RIGHT;LEFT

## 2023-09-14 NOTE — PROGRESS NOTES
Emma Brewer  : 1945  Primary: Medicare Part A And B (Medicare)  Secondary:  [facility] [place] [address]  [address] [facility] [address]  Phone: [phone]  Fax: [phone] Plan Frequency: 1 time per week for 8-12 weeks    Plan of Care/Certification Expiration Date: 23      >PT Visit Info:   Plan Frequency: 1 time per week for 8-12 weeks  Plan of Care/Certification Expiration Date: 23  Total # of Visits to Date: 4  Progress Note Counter: 4  Progress Note Due Date: 23      Visit Count:  4    OUTPATIENT PHYSICAL THERAPY:OP NOTE TYPE: OP Note Type: Treatment Note 2023       Episode  }Appt Desk             Treatment Diagnosis:  Pain in left hip (M25.552)  Stiffness of Left Hip, Not elsewhere classified (M25.652)  Pain in Right Hip (M25.551)  Stiffness of Right Hip, Not elsewhere classified (M25.651)  Difficulty in walking, Not elsewhere classified (R26.2)  Other abnormalities of gait and mobility (R26.89)  Generalized Muscle Weakness (M62.81)  History of falling (Z91.81)  Medical/Referring Diagnosis:  No admission diagnoses are documented for this encounter. M16.0 (ICD-10-CM) - Bilateral primary osteoarthritis of hip  Referring Physician:  Gatito Worthy PA-C MD Orders:  PT Eval and Treat   Date of Onset:  Onset Date:  (chronic)     Allergies:   Latex, Articaine, Donepezil, Epinephrine, Erythromycin, Lexapro [escitalopram], Penicillins, Cephalexin, Chlorzoxazone, Clindamycin, Gabapentin, and Tramadol  Restrictions/Precautions:  No data recordedNo data recorded     Interventions Planned (Treatment may consist of any combination of the following):    Current Treatment Recommendations: Strengthening; ROM; Balance training; Functional mobility training; Transfer training; Dry needling; Therapeutic activities; Modalities;  Positioning; Home exercise program; Neuromuscular re-education; Manual       >Subjective Comments:   R hip/knee have been

## 2023-09-21 ENCOUNTER — HOSPITAL ENCOUNTER (OUTPATIENT)
Dept: PHYSICAL THERAPY | Age: 78
Setting detail: RECURRING SERIES
Discharge: HOME | End: 2023-09-24
Payer: MEDICARE

## 2023-09-21 PROCEDURE — 97110 THERAPEUTIC EXERCISES: CPT

## 2023-09-21 ASSESSMENT — PAIN DESCRIPTION - ORIENTATION: ORIENTATION: RIGHT

## 2023-09-21 ASSESSMENT — PAIN DESCRIPTION - LOCATION: LOCATION: HIP

## 2023-09-21 ASSESSMENT — PAIN SCALES - GENERAL: PAINLEVEL_OUTOF10: 4

## 2023-09-21 NOTE — PROGRESS NOTES
Sheree Morrell  : 1945  Primary: Medicare Part A And B (Medicare)  Secondary:  [facility] [address] [address]  Phone: [phone]  Fax: [phone] Plan Frequency: 1 time per week for 8-12 weeks    Plan of Care/Certification Expiration Date: 23      >PT Visit Info:   Plan Frequency: 1 time per week for 8-12 weeks  Plan of Care/Certification Expiration Date: 23  Total # of Visits to Date: 5  Progress Note Counter: 5  Progress Note Due Date: 23      Visit Count:  5    OUTPATIENT PHYSICAL THERAPY:OP NOTE TYPE: OP Note Type: Treatment Note 2023       Episode  }Appt Desk             Treatment Diagnosis:  Pain in left hip (M25.552)  Stiffness of Left Hip, Not elsewhere classified (M25.652)  Pain in Right Hip (M25.551)  Stiffness of Right Hip, Not elsewhere classified (M25.651)  Difficulty in walking, Not elsewhere classified (R26.2)  Other abnormalities of gait and mobility (R26.89)  Generalized Muscle Weakness (M62.81)  History of falling (Z91.81)  Medical/Referring Diagnosis:  No admission diagnoses are documented for this encounter. M16.0 (ICD-10-CM) - Bilateral primary osteoarthritis of hip  Referring Physician:  Tyron Patton PA-C MD Orders:  PT Eval and Treat   Date of Onset:  Onset Date:  (chronic)     Allergies:   Latex, Articaine, Donepezil, Epinephrine, Erythromycin, Lexapro [escitalopram], Penicillins, Cephalexin, Chlorzoxazone, Clindamycin, Gabapentin, and Tramadol  Restrictions/Precautions:  No data recordedNo data recorded     Interventions Planned (Treatment may consist of any combination of the following):    Current Treatment Recommendations: Strengthening; ROM; Balance training; Functional mobility training; Transfer training; Dry needling; Therapeutic activities; Modalities;  Positioning; Home exercise program; Neuromuscular re-education; Manual       >Subjective Comments:   Sat down too long

## 2023-09-28 ENCOUNTER — HOSPITAL ENCOUNTER (OUTPATIENT)
Dept: PHYSICAL THERAPY | Age: 78
Setting detail: RECURRING SERIES
End: 2023-09-28
Payer: MEDICARE

## 2023-09-28 PROCEDURE — 97110 THERAPEUTIC EXERCISES: CPT

## 2023-09-28 ASSESSMENT — PAIN SCALES - GENERAL: PAINLEVEL_OUTOF10: 4

## 2023-09-28 ASSESSMENT — PAIN DESCRIPTION - ORIENTATION: ORIENTATION: RIGHT

## 2023-09-28 ASSESSMENT — PAIN DESCRIPTION - LOCATION: LOCATION: HIP;LEG;BACK

## 2023-09-28 NOTE — PROGRESS NOTES
Ratna Harris  : 1945  Primary: Medicare Part A And B (Medicare)  Secondary:  [facility] Therapy [place] [address] [address] [facility] [address]  Phone: [phone]  Fax: [phone] Plan Frequency: 1 time per week for 8-12 weeks    Plan of Care/Certification Expiration Date: 23      >PT Visit Info:   Plan Frequency: 1 time per week for 8-12 weeks  Plan of Care/Certification Expiration Date: 23  Total # of Visits to Date: 6  Progress Note Counter: 6  Progress Note Due Date: 23      Visit Count:  6    OUTPATIENT PHYSICAL THERAPY:OP NOTE TYPE: OP Note Type: Treatment Note 2023       Episode  }Appt Desk             Treatment Diagnosis:  Pain in left hip (M25.552)  Stiffness of Left Hip, Not elsewhere classified (M25.652)  Pain in Right Hip (M25.551)  Stiffness of Right Hip, Not elsewhere classified (M25.651)  Difficulty in walking, Not elsewhere classified (R26.2)  Other abnormalities of gait and mobility (R26.89)  Generalized Muscle Weakness (M62.81)  History of falling (Z91.81)  Medical/Referring Diagnosis:  No admission diagnoses are documented for this encounter. M16.0 (ICD-10-CM) - Bilateral primary osteoarthritis of hip  Referring Physician:  Carrol Jung PA-C MD Orders:  PT Eval and Treat   Date of Onset:  Onset Date:  (chronic)     Allergies:   Latex, Articaine, Donepezil, Epinephrine, Erythromycin, Lexapro [escitalopram], Penicillins, Cephalexin, Chlorzoxazone, Clindamycin, Gabapentin, and Tramadol  Restrictions/Precautions:  No data recordedNo data recorded     Interventions Planned (Treatment may consist of any combination of the following):    Current Treatment Recommendations: Strengthening; ROM; Balance training; Functional mobility training; Transfer training; Dry needling; Therapeutic activities; Modalities;  Positioning; Home exercise program; Neuromuscular re-education; Manual       >Subjective Comments:   My back and R hip/leg 10 reps From mat table x 10 reps From mat table x 10 reps   sidestepping  10 feet x 2 10 feet x 2 10 feet x 4 10 feet x 4 10 feet x 4   marching    20 feet x 2 20 feet x 2 20 feet x 2   Walking backwards     10 feet x 2 10 feet x 2   stairs   Up/down step to gait with rail with SBA      Walking in clinic   Working on increasing gait speed and step length Working on increasing gait speed and step length Working on increasing gait speed and step length Working on increasing gait speed and step length   Seated calf stretch with towel      3 x 30 sec B, gave HEP   Pt education      Discussed using a frozen water bottle to roll foot on in sitting for foot/heel pain     Treatment/Session Summary:    >Treatment Assessment:   Patient tolerated exercises fair. Gave HEP for ankle stretch and discussed strategies to help with plantarfasciits, as well as cortisone shots of hip/knee  Communication/Consultation:  None today  Equipment provided today:  none  Recommendations/Intent for next treatment session: Next visit will focus on strengthening, balance training, pain reduction.     >Total Treatment Billable Duration:  40 minutes   Time In: 1300  Time Out: 96126 Jayson RODRIGUEZ, PT       Charge Capture  }Post Session Pain  PT Visit Info  [facility] Portal  MD Guidelines  Scanned Media  Benefits  MyChart    Future Appointments   Date Time Provider [address]   10/4/2023  1:00 PM Lelia Carrillo PT [facility]   10/12/2023  1:00 PM Lelia Carrillo, PT [facility]   10/19/2023  1:00 PM Lelia Carrillo PT [facility]   10/26/2023  1:00 PM Lelia Carrillo, PT [facility]   10/30/2023  2:30 PM GEORGE Pickett [facility] [place] [facility]

## 2023-10-04 ENCOUNTER — HOSPITAL ENCOUNTER (OUTPATIENT)
Dept: PHYSICAL THERAPY | Age: 78
Setting detail: RECURRING SERIES
End: 2023-10-04
Payer: MEDICARE

## 2023-10-04 NOTE — PROGRESS NOTES
Corina Tenorio  : 1945  Primary: Medicare Part A And B  Secondary:  Cheryl Knox  [address] [place] [address]  Phone: [phone]  Fax: [phone]    PT Visit Info: Total # of Visits to Date: 6  Progress Note Counter: 6  Progress Note Due Date: 23  Canceled Appointment: 1 (10/4/23)     OT Visit Info:  No data recorded    OUTPATIENT THERAPY: 10/4/2023  Episode  Appt Desk        Corina Tenorio cancelled her appointment for today due to illness. Will plan to follow up next during next appointment.   Thank you,  Lena Corona, PT    Future Appointments   Date Time Provider [address]   10/12/2023  1:00 PM Lena Corona PT [facility]   10/19/2023  1:00 PM Lena Corona PT [facility]   10/26/2023  1:00 PM ANGELA Collins   10/30/2023  2:30 PM Jacqueline Madrid PA [facility]

## 2023-10-12 ENCOUNTER — HOSPITAL ENCOUNTER (OUTPATIENT)
Dept: PHYSICAL THERAPY | Age: 78
Setting detail: RECURRING SERIES
Discharge: HOME | End: 2023-10-15
Payer: MEDICARE

## 2023-10-12 PROCEDURE — 97110 THERAPEUTIC EXERCISES: CPT

## 2023-10-12 ASSESSMENT — PAIN DESCRIPTION - ORIENTATION: ORIENTATION: RIGHT;LEFT

## 2023-10-12 ASSESSMENT — PAIN SCALES - GENERAL: PAINLEVEL_OUTOF10: 4

## 2023-10-12 NOTE — PROGRESS NOTES
Arthur Marie  : 1945  Primary: Medicare Part A And B (Medicare)  Secondary:  [facility] [place] [address] [address] [facility] [address]  Phone: [phone]  Fax: [phone] Plan Frequency: 1 time per week for 8-12 weeks    Plan of Care/Certification Expiration Date: 23      PT Visit Info:   Plan Frequency: 1 time per week for 8-12 weeks  Plan of Care/Certification Expiration Date: 23  Total # of Visits to Date: 7  Progress Note Counter: 1  Progress Note Due Date: 23  Canceled Appointment: 1 (10/4/23)      Visit Count:  7                OUTPATIENT PHYSICAL THERAPY:             Progress Report 10/12/2023               Episode (B hip OA) Appt Desk         Treatment Diagnosis:  Pain in left hip (M25.552)  Stiffness of Left Hip, Not elsewhere classified (M25.652)  Pain in Right Hip (M25.551)  Stiffness of Right Hip, Not elsewhere classified (M25.651)  Difficulty in walking, Not elsewhere classified (R26.2)  Other abnormalities of gait and mobility (R26.89)  Generalized Muscle Weakness (M62.81)  History of falling (Z91.81)  Medical/Referring Diagnosis:  No admission diagnoses are documented for this encounter. M16.0 (ICD-10-CM) - Bilateral primary osteoarthritis of hip  Referring Physician:  Nya Burleson PA-C MD Orders:  PT Eval and Treat   Return MD Appt:    Date of Onset:  Onset Date:  (chronic)      Allergies:  Latex, Articaine, Donepezil, Epinephrine, Erythromycin, Lexapro [escitalopram], Penicillins, Cephalexin, Chlorzoxazone, Clindamycin, Gabapentin, and Tramadol  Restrictions/Precautions:  history of falls         Medications Last Reviewed:  10/12/2023     SUBJECTIVE   History of Injury/Illness (Reason for Referral): Had cortisone injection L hip recently, 2023. Did not do R hip. Use UV pad on R hip and \"relief factor\" supplements. See chiropractor regularly. Has had falls, last one a few months ago.     Hips hurt, stiff and

## 2023-10-12 NOTE — PROGRESS NOTES
Corina Kennedyyoshi  : 1945  Primary: Medicare Part A And B (Medicare)  Secondary:  [facility] Therapy [place] [address] [address] [facility] [address]  Phone: [phone]  Fax: [phone] Plan Frequency: 1 time per week for 8-12 weeks    Plan of Care/Certification Expiration Date: 23      >PT Visit Info:   Plan Frequency: 1 time per week for 8-12 weeks  Plan of Care/Certification Expiration Date: 23  Total # of Visits to Date: 7  Progress Note Counter: 1  Progress Note Due Date: 23  Canceled Appointment: 1 (10/4/23)      Visit Count:  7    OUTPATIENT PHYSICAL THERAPY:OP NOTE TYPE: OP Note Type: Treatment Note 10/12/2023       Episode  }Appt Desk             Treatment Diagnosis:  Pain in left hip (M25.552)  Stiffness of Left Hip, Not elsewhere classified (M25.652)  Pain in Right Hip (M25.551)  Stiffness of Right Hip, Not elsewhere classified (M25.651)  Difficulty in walking, Not elsewhere classified (R26.2)  Other abnormalities of gait and mobility (R26.89)  Generalized Muscle Weakness (M62.81)  History of falling (Z91.81)  Medical/Referring Diagnosis:  No admission diagnoses are documented for this encounter. M16.0 (ICD-10-CM) - Bilateral primary osteoarthritis of hip  Referring Physician:  Bharti Ko PA-C MD Orders:  PT Eval and Treat   Date of Onset:  Onset Date:  (chronic)     Allergies:   Latex, Articaine, Donepezil, Epinephrine, Erythromycin, Lexapro [escitalopram], Penicillins, Cephalexin, Chlorzoxazone, Clindamycin, Gabapentin, and Tramadol  Restrictions/Precautions:  No data recordedNo data recorded     Interventions Planned (Treatment may consist of any combination of the following):    Current Treatment Recommendations: Strengthening; ROM; Balance training; Functional mobility training; Transfer training; Dry needling; Therapeutic activities; Modalities;  Positioning; Home exercise program; Neuromuscular re-education; Manual       >Subjective

## 2023-10-19 ENCOUNTER — HOSPITAL ENCOUNTER (OUTPATIENT)
Dept: PHYSICAL THERAPY | Age: 78
Setting detail: RECURRING SERIES
Discharge: HOME | End: 2023-10-22
Payer: MEDICARE

## 2023-10-19 PROCEDURE — 97110 THERAPEUTIC EXERCISES: CPT

## 2023-10-19 ASSESSMENT — PAIN DESCRIPTION - LOCATION: LOCATION: HIP

## 2023-10-19 ASSESSMENT — PAIN DESCRIPTION - ORIENTATION: ORIENTATION: RIGHT

## 2023-10-19 ASSESSMENT — PAIN SCALES - GENERAL: PAINLEVEL_OUTOF10: 4

## 2023-10-19 NOTE — PROGRESS NOTES
Buck Smith  : 1945  Primary: Medicare Part A And B (Medicare)  Secondary:  [facility] Therapy [place] [address] [address] [facility] [address]  Phone: [phone]  Fax: [phone] Plan Frequency: 1 time per week for 8-12 weeks    Plan of Care/Certification Expiration Date: 23      >PT Visit Info:   Plan Frequency: 1 time per week for 8-12 weeks  Plan of Care/Certification Expiration Date: 23  Total # of Visits to Date: 8  Progress Note Counter: 2  Progress Note Due Date: 23  Canceled Appointment: 1 (10/4/23)      Visit Count:  8    OUTPATIENT PHYSICAL THERAPY:OP NOTE TYPE: OP Note Type: Treatment Note 10/19/2023       Episode  }Appt Desk             Treatment Diagnosis:  Pain in left hip (M25.552)  Stiffness of Left Hip, Not elsewhere classified (M25.652)  Pain in Right Hip (M25.551)  Stiffness of Right Hip, Not elsewhere classified (M25.651)  Difficulty in walking, Not elsewhere classified (R26.2)  Other abnormalities of gait and mobility (R26.89)  Generalized Muscle Weakness (M62.81)  History of falling (Z91.81)  Medical/Referring Diagnosis:  No admission diagnoses are documented for this encounter. M16.0 (ICD-10-CM) - Bilateral primary osteoarthritis of hip  Referring Physician:  Chaz Truong PA-C MD Orders:  PT Eval and Treat   Date of Onset:  Onset Date:  (chronic)     Allergies:   Latex, Articaine, Donepezil, Epinephrine, Erythromycin, Lexapro [escitalopram], Penicillins, Cephalexin, Chlorzoxazone, Clindamycin, Gabapentin, and Tramadol  Restrictions/Precautions:  No data recordedNo data recorded     Interventions Planned (Treatment may consist of any combination of the following):    Current Treatment Recommendations: Strengthening; ROM; Balance training; Functional mobility training; Transfer training; Dry needling; Therapeutic activities; Modalities;  Positioning; Home exercise program; Neuromuscular re-education; Manual       >Subjective

## 2023-10-26 ENCOUNTER — HOSPITAL ENCOUNTER (OUTPATIENT)
Dept: PHYSICAL THERAPY | Age: 78
Setting detail: RECURRING SERIES
Discharge: HOME | End: 2023-10-29
Payer: MEDICARE

## 2023-10-26 PROCEDURE — 97110 THERAPEUTIC EXERCISES: CPT

## 2023-10-26 ASSESSMENT — PAIN DESCRIPTION - LOCATION: LOCATION: HIP

## 2023-10-26 ASSESSMENT — PAIN SCALES - GENERAL: PAINLEVEL_OUTOF10: 3

## 2023-10-26 ASSESSMENT — PAIN DESCRIPTION - ORIENTATION: ORIENTATION: RIGHT

## 2023-10-26 NOTE — PROGRESS NOTES
Hayder Cuff  : 1945  Primary: Medicare Part A And B (Medicare)  Secondary:  Jur[facility] Hammans Therapy [place] [address] [address] [facility] [address]  Phone: [phone]  Fax: [phone] Plan Frequency: 1 time per week for 8-12 weeks    Plan of Care/Certification Expiration Date: 23      >PT Visit Info:    Plan Frequency: 1 time per week for 8-12 weeks  Plan of Care/Certification Expiration Date: 23  Total # of Visits to Date: 9  Progress Note Counter: 3  Progress Note Due Date: 23  Canceled Appointment: 1 (10/4/23)      Visit Count:  9    OUTPATIENT PHYSICAL THERAPY:OP NOTE TYPE: OP Note Type: Treatment Note 10/26/2023       Episode  }Appt Desk             Treatment Diagnosis:  Pain in left hip (M25.552)  Stiffness of Left Hip, Not elsewhere classified (M25.652)  Pain in Right Hip (M25.551)  Stiffness of Right Hip, Not elsewhere classified (M25.651)  Difficulty in walking, Not elsewhere classified (R26.2)  Other abnormalities of gait and mobility (R26.89)  Generalized Muscle Weakness (M62.81)  History of falling (Z91.81)  Medical/Referring Diagnosis:  No admission diagnoses are documented for this encounter. M16.0 (ICD-10-CM) - Bilateral primary osteoarthritis of hip  Referring Physician:  Yuko Galarza PA-C MD Orders:  PT Eval and Treat   Date of Onset:  Onset Date:  (chronic)     Allergies:   Latex, Articaine, Donepezil, Epinephrine, Erythromycin, Lexapro [escitalopram], Penicillins, Cephalexin, Chlorzoxazone, Clindamycin, Gabapentin, and Tramadol  Restrictions/Precautions:  No data recordedNo data recorded     Interventions Planned (Treatment may consist of any combination of the following):    Current Treatment Recommendations: Strengthening; ROM; Balance training; Functional mobility training; Transfer training; Dry needling; Therapeutic activities; Modalities;  Positioning; Home exercise program; Neuromuscular re-education; Manual       >Subjective

## 2023-10-27 ENCOUNTER — TELEPHONE (OUTPATIENT)
Dept: FAMILY MEDICINE CLINIC | Facility: CLINIC | Age: 78
End: 2023-10-27

## 2023-10-27 NOTE — TELEPHONE ENCOUNTER
The MA called [facility] and spoke to [facility]. She stated the patient does not need anything at this time. If they need any records, they will fax a request.     [facility].

## 2023-10-27 NOTE — TELEPHONE ENCOUNTER
Patient is stating that she has an appointment next week for the psychiatrist.      She states she has a list of patient information they need before the appointment  per the patient they need:     Stress test results (Dr Lex Nageotte did the test)   Full panel lab results   Full medical physical notes. Bone Density results  Mammogram   Immunizations HPV Hep B , pneumonia and flu and tetanus  chest x ray     She was referred to [facility] [phone]. .      Fax: [phone]    She had the order for the olanzapine and metoprolol's done

## 2023-11-01 ENCOUNTER — HOSPITAL ENCOUNTER (OUTPATIENT)
Dept: PHYSICAL THERAPY | Age: 78
Setting detail: RECURRING SERIES
Discharge: HOME | End: 2023-11-04
Payer: MEDICARE

## 2023-11-01 PROCEDURE — 97110 THERAPEUTIC EXERCISES: CPT

## 2023-11-01 ASSESSMENT — PAIN SCALES - GENERAL: PAINLEVEL_OUTOF10: 3

## 2023-11-01 ASSESSMENT — PAIN DESCRIPTION - LOCATION: LOCATION: HIP

## 2023-11-01 ASSESSMENT — PAIN DESCRIPTION - ORIENTATION: ORIENTATION: RIGHT

## 2023-11-01 NOTE — PROGRESS NOTES
Comments:   Inside of R knee was bothering me with the marching exercise, but I did what I could. >Initial: Right Hip 3/10>Post Session:  Right  Hip 3/10   Medications Last Reviewed:  11/1/2023  Updated Objective Findings:  None Today  Treatment   THERAPEUTIC EXERCISE: (40 minutes):    Exercises per grid below to improve mobility and strength. Required minimal verbal cues to promote proper body alignment, promote proper body posture, and promote proper body mechanics. Progressed resistance, range, repetitions, and complexity of movement as indicated. Date   9/21/23 Date   9/28/23 Date   10/12/23 Date  10/19/23 Date   10/26/23 Date   11/1/23   Activity/Exercise         Standing marching X 10 reps B, AROM as tolerated  X 10 reps B, AROM as tolerated X 15 reps B, AROM as tolerated X 15 reps B, AROM as tolerated X 15 reps B, AROM as tolerated   Standing hip abduction X 10 reps B, AROM as tolerated  X 10 reps B, AROM as tolerated X 15 reps B, AROM as tolerated X 15 reps B, AROM as tolerated X 15 reps B, AROM as tolerated   Standing hip extension X 10 reps B, AROM as tolerated  X 10 reps B, AROM as tolerated X 15 reps B, AROM as tolerated X 15 reps B, AROM as tolerated X 15 reps B, AROM as tolerated   Standing knee flexion X 10 reps B  X 10 reps B X 15 reps B X 15 reps B X 15 reps B   Standing heel and toe raises X 10 reps B each exercise        Standing calf stretch   Slantboard 3 x 20 sec each foot. Slantboard 3 x 20 sec each foot. Slantboard 3 x 20 sec each foot.     Seated LAQs X 15 reps B with 2# B        Seated hip adduction X 15 reps B  X 15 reps B X 15 reps B X 15 reps B    Nustep  To address muscular imbalance with progressive resistance  Advancement of increased activity tolerance Warm up: Level 2 x 8 minutes Warm up: Level 2 x 8 minutes Warm up: Level 3 x 6 minutes Warm up: Level 1 x 8 minutes Warm up: Level 1 x 8 minutes Warm up: Level 1 x 8 minutes   Seated elbow flexion/extension    2# x15 reps B 2# x 10

## 2023-11-08 ENCOUNTER — HOSPITAL ENCOUNTER (OUTPATIENT)
Dept: PHYSICAL THERAPY | Age: 78
Setting detail: RECURRING SERIES
End: 2023-11-08
Payer: MEDICARE

## 2023-11-08 NOTE — PROGRESS NOTES
Tej Argueta  : 1945  Primary: Medicare Part A And B  Secondary:  Froilan Jasso  [address] [place] [address]  Phone: [phone]  Fax: [phone]    PT Visit Info: Total # of Visits to Date: 10  Progress Note Counter: 4  Progress Note Due Date: 23  Canceled Appointment: 2 (23)     OT Visit Info:  No data recorded    OUTPATIENT THERAPY: 2023  Episode  Appt Desk        Tej Argueta cancelled her appointment for today due to  sore throat . Will plan to follow up next during next appointment.   Thank you,  Red Fisher PT    Future Appointments   Date Time Provider [address]   2023  1:45 PM [facility]   [facility]  [facility]00 PM [facility]   2023  1:00 PM Andrew Fritz MD [facility]

## 2023-11-16 ENCOUNTER — TELEMEDICINE (OUTPATIENT)
Dept: FAMILY MEDICINE CLINIC | Facility: CLINIC | Age: 78
End: 2023-11-16
Payer: MEDICARE

## 2023-11-16 ENCOUNTER — HOSPITAL ENCOUNTER (OUTPATIENT)
Dept: PHYSICAL THERAPY | Age: 78
Setting detail: RECURRING SERIES
End: 2023-11-16
Payer: MEDICARE

## 2023-11-16 DIAGNOSIS — I10 ESSENTIAL (PRIMARY) HYPERTENSION: ICD-10-CM

## 2023-11-16 DIAGNOSIS — R53.83 OTHER FATIGUE: ICD-10-CM

## 2023-11-16 DIAGNOSIS — F31.70 BIPOLAR AFFECTIVE DISORDER IN REMISSION (HCC): ICD-10-CM

## 2023-11-16 DIAGNOSIS — J01.90 ACUTE NON-RECURRENT SINUSITIS, UNSPECIFIED LOCATION: Primary | ICD-10-CM

## 2023-11-16 PROCEDURE — 99442 PR PHYS/QHP TELEPHONE EVALUATION 11-20 MIN: CPT | Performed by: FAMILY MEDICINE

## 2023-11-16 RX ORDER — DOXYCYCLINE HYCLATE 100 MG
100 TABLET ORAL 2 TIMES DAILY
Qty: 14 TABLET | Refills: 0 | Status: SHIPPED | OUTPATIENT
Start: 2023-11-16 | End: 2023-11-23

## 2023-11-16 NOTE — PROGRESS NOTES
Sonja Cullen  : 1945  Primary: Medicare Part A And B  Secondary:  Leanne Palacios  [address] [place] [address]  Phone: [phone]  Fax: [phone]    PT Visit Info: Total # of Visits to Date: 10  Progress Note Counter: 4  Progress Note Due Date: 23  Canceled Appointment: 3 (23)     OT Visit Info:  No data recorded    OUTPATIENT THERAPY: 2023  Episode  Appt Desk        Sonja Cullen cancelled her appointment for today due to illness. Will plan to follow up next during next appointment.   Thank you,  Hitesh Irwin, PT    Future Appointments   Date Time Provider [address]   2023  9:15 AM MD GEETA Moss [facility]   2023  7:00 PM Hitesh Irwin, PT [facility]   2023  1:00 PM MD GEETA Galindo [facility]

## 2023-11-16 NOTE — PROGRESS NOTES
Willem Zavala (: 1945) is a 70 y.o. female, established patient, here for evaluation of the following chief complaint(s):   No chief complaint on file. ASSESSMENT/PLAN:  1. Acute non-recurrent sinusitis, unspecified location  -     doxycycline hyclate (VIBRA-TABS) 100 MG tablet; Take 1 tablet by mouth 2 times daily for 7 days, Disp-14 tablet, R-0Normal  2. Other fatigue  3. Essential (primary) hypertension  4. Bipolar affective disorder in remission (HCC)    Worsening congestion in sinuses over past 10 days, cough, sore throat, no improvement with home remedies, mucinex,   Has not done covid test, has not left house in a while, doubts its covid. Continue sinus rinses, mucinex, fluids, will send in doxycycline for possible sinus infection, has f/u on Monday with pcp      SUBJECTIVE/OBJECTIVE:  HPI  See above  Review of Systems          No data to display                  Phone Time Documentation:  Spent 20 minutes with patient on phone discussing health concerns. Willem Zavala is being evaluated by a Virtual Visit (audio only) encounter to address concerns as mentioned above. A caregiver was present when appropriate. Due to this being a TeleHealth encounter (During [place] [id number] public health emergency), evaluation of the following organ systems was limited: Vitals/Constitutional/EENT/Resp/CV/GI//MS/Neuro/Skin/Heme-Lymph-Imm. Pursuant to the emergency declaration under the [address] and the [employer] General Act, this Virtual Visit was conducted with patient's (and/or legal guardian's) consent, to reduce the patient's risk of exposure to COVID-19 and provide necessary medical care.   The patient (and/or legal guardian) has also been advised to contact this office for worsening conditions or problems, and seek emergency medical treatment and/or call 911 if deemed

## 2023-11-20 ENCOUNTER — OFFICE VISIT (OUTPATIENT)
Dept: FAMILY MEDICINE CLINIC | Facility: CLINIC | Age: 78
End: 2023-11-20
Payer: MEDICARE

## 2023-11-20 VITALS
DIASTOLIC BLOOD PRESSURE: 84 MMHG | SYSTOLIC BLOOD PRESSURE: 154 MMHG | WEIGHT: 141.13 LBS | BODY MASS INDEX: 27.71 KG/M2 | HEART RATE: 86 BPM | OXYGEN SATURATION: 97 % | TEMPERATURE: 97.5 F | HEIGHT: 60 IN

## 2023-11-20 DIAGNOSIS — G31.84 MILD COGNITIVE IMPAIRMENT: ICD-10-CM

## 2023-11-20 DIAGNOSIS — Z00.00 MEDICARE ANNUAL WELLNESS VISIT, SUBSEQUENT: Primary | ICD-10-CM

## 2023-11-20 DIAGNOSIS — F31.70 BIPOLAR AFFECTIVE DISORDER IN REMISSION (HCC): ICD-10-CM

## 2023-11-20 PROCEDURE — 1123F ACP DISCUSS/DSCN MKR DOCD: CPT | Performed by: FAMILY MEDICINE

## 2023-11-20 PROCEDURE — G0439 PPPS, SUBSEQ VISIT: HCPCS | Performed by: FAMILY MEDICINE

## 2023-11-20 PROCEDURE — 3077F SYST BP >= 140 MM HG: CPT | Performed by: FAMILY MEDICINE

## 2023-11-20 PROCEDURE — G8484 FLU IMMUNIZE NO ADMIN: HCPCS | Performed by: FAMILY MEDICINE

## 2023-11-20 PROCEDURE — 3079F DIAST BP 80-89 MM HG: CPT | Performed by: FAMILY MEDICINE

## 2023-11-20 ASSESSMENT — PATIENT HEALTH QUESTIONNAIRE - PHQ9
7. TROUBLE CONCENTRATING ON THINGS, SUCH AS READING THE NEWSPAPER OR WATCHING TELEVISION: 1
SUM OF ALL RESPONSES TO PHQ9 QUESTIONS 1 & 2: 2
SUM OF ALL RESPONSES TO PHQ QUESTIONS 1-9: 8
4. FEELING TIRED OR HAVING LITTLE ENERGY: 1
10. IF YOU CHECKED OFF ANY PROBLEMS, HOW DIFFICULT HAVE THESE PROBLEMS MADE IT FOR YOU TO DO YOUR WORK, TAKE CARE OF THINGS AT HOME, OR GET ALONG WITH OTHER PEOPLE: 1
SUM OF ALL RESPONSES TO PHQ QUESTIONS 1-9: 8
3. TROUBLE FALLING OR STAYING ASLEEP: 1
2. FEELING DOWN, DEPRESSED OR HOPELESS: 1
5. POOR APPETITE OR OVEREATING: 1
SUM OF ALL RESPONSES TO PHQ QUESTIONS 1-9: 8
9. THOUGHTS THAT YOU WOULD BE BETTER OFF DEAD, OR OF HURTING YOURSELF: 0
6. FEELING BAD ABOUT YOURSELF - OR THAT YOU ARE A FAILURE OR HAVE LET YOURSELF OR YOUR FAMILY DOWN: 1
SUM OF ALL RESPONSES TO PHQ QUESTIONS 1-9: 8
8. MOVING OR SPEAKING SO SLOWLY THAT OTHER PEOPLE COULD HAVE NOTICED. OR THE OPPOSITE, BEING SO FIGETY OR RESTLESS THAT YOU HAVE BEEN MOVING AROUND A LOT MORE THAN USUAL: 1
1. LITTLE INTEREST OR PLEASURE IN DOING THINGS: 1

## 2023-11-20 ASSESSMENT — COLUMBIA-SUICIDE SEVERITY RATING SCALE - C-SSRS
3. HAVE YOU BEEN THINKING ABOUT HOW YOU MIGHT KILL YOURSELF?: NO
7. DID THIS OCCUR IN THE LAST THREE MONTHS: NO
4. HAVE YOU HAD THESE THOUGHTS AND HAD SOME INTENTION OF ACTING ON THEM?: NO
5. HAVE YOU STARTED TO WORK OUT OR WORKED OUT THE DETAILS OF HOW TO KILL YOURSELF? DO YOU INTEND TO CARRY OUT THIS PLAN?: NO
2. HAVE YOU ACTUALLY HAD ANY THOUGHTS OF KILLING YOURSELF?: NO
6. HAVE YOU EVER DONE ANYTHING, STARTED TO DO ANYTHING, OR PREPARED TO DO ANYTHING TO END YOUR LIFE?: NO
1. WITHIN THE PAST MONTH, HAVE YOU WISHED YOU WERE DEAD OR WISHED YOU COULD GO TO SLEEP AND NOT WAKE UP?: NO
BASED ON RESPONSES TO C-SSRS QS 1-6, WHAT IS THE PATIENT'S OVERALL RISK RATING FOR SUICIDE: NO RISK

## 2023-11-20 NOTE — PATIENT INSTRUCTIONS
comprehensive review of your medical history including lifestyle, illnesses that may run in your family, and various assessments and screenings as appropriate. After reviewing your medical record and screening and assessments performed today your provider may have ordered immunizations, labs, imaging, and/or referrals for you. A list of these orders (if applicable) as well as your Preventive Care list are included within your After Visit Summary for your review. Other Preventive Recommendations:    A preventive eye exam performed by an eye specialist is recommended every 1-2 years to screen for glaucoma; cataracts, macular degeneration, and other eye disorders. A preventive dental visit is recommended every 6 months. Try to get at least 150 minutes of exercise per week or 10,000 steps per day on a pedometer . Order or download the FREE \"Exercise & Physical Activity: Your Everyday Guide\" from [employer]. Call 7-357.290.3467 or search [employer] online. You need 2544-1600 mg of calcium and 8255-9559 IU of vitamin D per day. It is possible to meet your calcium requirement with diet alone, but a vitamin D supplement is usually necessary to meet this goal.  When exposed to the sun, use a sunscreen that protects against both UVA and UVB radiation with an SPF of 30 or greater. Reapply every 2 to 3 hours or after sweating, drying off with a towel, or swimming. Always wear a seat belt when traveling in a car. Always wear a helmet when riding a bicycle or motorcycle.

## 2023-11-20 NOTE — PROGRESS NOTES
Pregnenolone 50 MG TABS Take 100 mg by mouth every morning Yes Provider, MD Mignon   Zinc Acetate, Oral, (ZINC ACETATE PO) Take 30 mg by mouth daily Yes Automatic Reconciliation, Ar   vitamin D (CHOLECALCIFEROL) 25 MCG (1000 UT) TABS tablet Take 2 tablets by mouth daily Takes 2-3 tabs Yes Automatic Reconciliation, Ar   cyanocobalamin 500 MCG tablet Take 1 tablet by mouth daily Yes Automatic Reconciliation, Ar   diphenhydrAMINE (BENADRYL) 25 MG capsule Take 1 capsule by mouth every 6 hours as needed for Allergies Yes Automatic Reconciliation, Ar   fexofenadine (ALLEGRA) 180 MG tablet Take 1 tablet by mouth daily Yes Automatic Reconciliation, Ar   ketoconazole (NIZORAL) 2 % shampoo Use PRN Yes Automatic Reconciliation, Ar   melatonin 3 MG TABS tablet Take 1 tablet by mouth nightly as needed (sleep) Takes 2 tabs Yes Automatic Reconciliation, Ar   vitamin E 1000 units capsule Take 400 Units by mouth daily Yes Automatic Reconciliation, Ar       CareTeam (Including outside providers/suppliers regularly involved in providing care):   Patient Care Team:  Elizabeth Baptiste MD as PCP - General (Family Medicine)  Elizabeth Baptiste MD as PCP - Empaneled Provider     Reviewed and updated this visit:  Allergies  Meds

## 2023-12-06 ENCOUNTER — APPOINTMENT (RX ONLY)
Dept: URBAN - METROPOLITAN AREA CLINIC 330 | Facility: CLINIC | Age: 78
Setting detail: DERMATOLOGY
End: 2023-12-06

## 2023-12-06 DIAGNOSIS — M71 OTHER BURSOPATHIES: ICD-10-CM

## 2023-12-06 DIAGNOSIS — D22 MELANOCYTIC NEVI: ICD-10-CM

## 2023-12-06 DIAGNOSIS — Z71.89 OTHER SPECIFIED COUNSELING: ICD-10-CM

## 2023-12-06 DIAGNOSIS — D18.0 HEMANGIOMA: ICD-10-CM

## 2023-12-06 DIAGNOSIS — L82.0 INFLAMED SEBORRHEIC KERATOSIS: ICD-10-CM

## 2023-12-06 DIAGNOSIS — Z85.828 PERSONAL HISTORY OF OTHER MALIGNANT NEOPLASM OF SKIN: ICD-10-CM

## 2023-12-06 DIAGNOSIS — L82.1 OTHER SEBORRHEIC KERATOSIS: ICD-10-CM

## 2023-12-06 DIAGNOSIS — L81.4 OTHER MELANIN HYPERPIGMENTATION: ICD-10-CM

## 2023-12-06 PROBLEM — M71.371 OTHER BURSAL CYST, RIGHT ANKLE AND FOOT: Status: ACTIVE | Noted: 2023-12-06

## 2023-12-06 PROBLEM — D22.5 MELANOCYTIC NEVI OF TRUNK: Status: ACTIVE | Noted: 2023-12-06

## 2023-12-06 PROBLEM — D18.01 HEMANGIOMA OF SKIN AND SUBCUTANEOUS TISSUE: Status: ACTIVE | Noted: 2023-12-06

## 2023-12-06 PROCEDURE — 99213 OFFICE O/P EST LOW 20 MIN: CPT | Mod: 25

## 2023-12-06 PROCEDURE — ? FULL BODY SKIN EXAM

## 2023-12-06 PROCEDURE — ? TREATMENT REGIMEN

## 2023-12-06 PROCEDURE — ? COUNSELING

## 2023-12-06 PROCEDURE — 17110 DESTRUCTION B9 LES UP TO 14: CPT

## 2023-12-06 PROCEDURE — ? LIQUID NITROGEN

## 2023-12-06 ASSESSMENT — LOCATION DETAILED DESCRIPTION DERM
LOCATION DETAILED: LEFT INFERIOR CENTRAL MALAR CHEEK
LOCATION DETAILED: RIGHT INFERIOR UPPER BACK
LOCATION DETAILED: INFERIOR THORACIC SPINE
LOCATION DETAILED: LEFT SUPERIOR ANTERIOR NECK
LOCATION DETAILED: LEFT CENTRAL MALAR CHEEK
LOCATION DETAILED: RIGHT DORSAL 3RD TOE
LOCATION DETAILED: RIGHT MEDIAL UPPER BACK

## 2023-12-06 ASSESSMENT — LOCATION SIMPLE DESCRIPTION DERM
LOCATION SIMPLE: LEFT CHEEK
LOCATION SIMPLE: UPPER BACK
LOCATION SIMPLE: RIGHT 3RD TOE
LOCATION SIMPLE: RIGHT UPPER BACK
LOCATION SIMPLE: LEFT ANTERIOR NECK

## 2023-12-06 ASSESSMENT — LOCATION ZONE DERM
LOCATION ZONE: FACE
LOCATION ZONE: TOE
LOCATION ZONE: TRUNK
LOCATION ZONE: NECK

## 2023-12-06 NOTE — PROCEDURE: LIQUID NITROGEN
Add 52 Modifier (Optional): no
Show Applicator Variable?: Yes
Consent: The patient's consent was obtained including but not limited to risks of crusting, scabbing, blistering, scarring, darker or lighter pigmentary change, recurrence, incomplete removal and infection.
Medical Necessity Information: It is in your best interest to select a reason for this procedure from the list below. All of these items fulfill various CMS LCD requirements except the new and changing color options.
Medical Necessity Clause: This procedure was medically necessary because the lesions that were treated were:
Number Of Freeze-Thaw Cycles: 1 freeze-thaw cycle
Detail Level: Detailed
Post-Care Instructions: I reviewed with the patient in detail post-care instructions. Patient is to wear sunprotection, and avoid picking at any of the treated lesions. Pt may apply Vaseline to crusted or scabbing areas.
Spray Paint Text: The liquid nitrogen was applied to the skin utilizing a spray paint frosting technique.

## 2023-12-06 NOTE — PROCEDURE: COUNSELING
Detail Level: Detailed
[FreeTextEntry1] : Patient is referred for screening colonoscopy.  This would be her first.  She will be scheduled.\par Blood work including CBC and CHEM screen profile were normal.
Detail Level: Generalized

## 2023-12-21 ENCOUNTER — HOSPITAL ENCOUNTER (OUTPATIENT)
Dept: PHYSICAL THERAPY | Age: 78
Setting detail: RECURRING SERIES
Discharge: HOME | End: 2023-12-24
Payer: MEDICARE

## 2023-12-21 PROCEDURE — 97164 PT RE-EVAL EST PLAN CARE: CPT

## 2023-12-21 NOTE — THERAPY RECERTIFICATION
Buck Smith  : 1945  Primary: Medicare Part A And B (Medicare)  Secondary:  [facility] [place] [address] [facility] [address]  Phone: [phone]  Fax: [phone] Plan Frequency: 1 time per week for 8-12 weeks    Plan of Care/Certification Expiration Date: 24      PT Visit Info:   Plan Frequency: 1 time per week for 8-12 weeks  Plan of Care/Certification Expiration Date: 24  Total # of Visits to Date: 11  Progress Note Counter: 1  Progress Note Due Date: 23  Canceled Appointment: 3 (23)      Visit Count:  11                OUTPATIENT PHYSICAL THERAPY:             Recertification                Episode (B hip OA) Appt Desk         Treatment Diagnosis:  Pain in left hip (M25.552)  Stiffness of Left Hip, Not elsewhere classified (M25.652)  Pain in Right Hip (M25.551)  Stiffness of Right Hip, Not elsewhere classified (M25.651)  Difficulty in walking, Not elsewhere classified (R26.2)  Other abnormalities of gait and mobility (R26.89)  Generalized Muscle Weakness (M62.81)  History of falling (Z91.81)  Medical/Referring Diagnosis:  No admission diagnoses are documented for this encounter. M16.0 (ICD-10-CM) - Bilateral primary osteoarthritis of hip  Referring Physician:  Jacob Pierce; Spencer Power MD MD Orders:  PT Eval and Treat   Return MD Appt:    Date of Onset:  Onset Date:  (chronic)      Allergies:  Latex, Articaine, Donepezil, Epinephrine, Erythromycin, Lexapro [escitalopram], Penicillins, Cephalexin, Chlorzoxazone, Clindamycin, Gabapentin, and Tramadol  Restrictions/Precautions:  history of falls         Medications Last Reviewed:  2023     SUBJECTIVE   History of Injury/Illness (Reason for Referral):    23: Pt returning today to continue PT. Chiropractor has been working on hips and low back. No falls but had a near fall in bathroom when caught foot on towel on floor.  Caught her

## 2024-01-15 DIAGNOSIS — I10 ESSENTIAL (PRIMARY) HYPERTENSION: ICD-10-CM

## 2024-01-15 DIAGNOSIS — F31.70 BIPOLAR AFFECTIVE DISORDER IN REMISSION (HCC): ICD-10-CM

## 2024-01-15 RX ORDER — OLANZAPINE 5 MG/1
5 TABLET ORAL DAILY
Qty: 90 TABLET | Refills: 0 | Status: SHIPPED | OUTPATIENT
Start: 2024-01-15

## 2024-01-15 RX ORDER — MELOXICAM 7.5 MG/1
7.5 TABLET ORAL DAILY
Qty: 30 TABLET | Refills: 1 | Status: SHIPPED | OUTPATIENT
Start: 2024-01-15

## 2024-01-15 NOTE — TELEPHONE ENCOUNTER
Patient requesting prescriptions for     metoprolol 25 mg last written # 180 with NRF  Olanzapine 5 mg last written # 90 with NRF  Meloxicam 7.5 mg # 30 with 1 RF  All written 8/30/2023    Please send to [facility]  Next appointment 5/23/2024

## 2024-02-08 ENCOUNTER — HOSPITAL ENCOUNTER (OUTPATIENT)
Dept: PHYSICAL THERAPY | Age: 79
Setting detail: RECURRING SERIES
End: 2024-02-08
Payer: MEDICARE

## 2024-02-12 ENCOUNTER — HOSPITAL ENCOUNTER (OUTPATIENT)
Dept: PHYSICAL THERAPY | Age: 79
Setting detail: RECURRING SERIES
Discharge: HOME | End: 2024-02-15
Payer: MEDICARE

## 2024-02-12 PROCEDURE — 97110 THERAPEUTIC EXERCISES: CPT

## 2024-02-12 NOTE — PROGRESS NOTES
Meredith Toribio  : 1945  Primary: Medicare Part A And B (Medicare)  Secondary:  [facility] @ [address] [place]  [facility] [address]  Phone: [phone]  Fax: [phone] Plan Frequency: 1 time per week for 8-12 weeks    Plan of Care/Certification Expiration Date: 24        Plan of Care/Certification Expiration Date:  Plan of Care/Certification Expiration Date: 24    Frequency/Duration:   Plan Frequency: 1 time per week for 8-12 weeks      Time In/Out:   Time In: 1300  Time Out: 1340      PT Visit Info:    Progress Note Due Date: 23  Total # of Visits to Date: 12  Progress Note Counter: 2  Canceled Appointment: 4 (24)      Visit Count:  12    OUTPATIENT PHYSICAL THERAPY:   Treatment Note 2024       Episode  (B hip OA)               Treatment Diagnosis:    No data found  Pain in left hip (M25.552)  Stiffness of Left Hip, Not elsewhere classified (M25.652)  Pain in Right Hip (M25.551)  Stiffness of Right Hip, Not elsewhere classified (M25.651)  Difficulty in walking, Not elsewhere classified (R26.2)  Other abnormalities of gait and mobility (R26.89)  Generalized Muscle Weakness (M62.81)  History of falling (Z91.81)  Medical/Referring Diagnosis:      M16.0 (ICD-10-CM) - Bilateral primary osteoarthritis of hip   Referring Physician:  Skyler Abarca PA-C;Walter Rojas MD MD Orders:  PT Eval and Treat   Return MD Appt:    Date of Onset:  Onset Date:  (chronic)     Allergies:   Latex, Articaine, Donepezil, Epinephrine, Erythromycin, Lexapro [escitalopram], Penicillins, Cephalexin, Chlorzoxazone, Clindamycin, Gabapentin, and Tramadol  Restrictions/Precautions:   History of falls      Interventions Planned (Treatment may consist of any combination of the following):  Current Treatment Recommendations: Strengthening; ROM; Balance training; Functional mobility training; Transfer training; Dry needling; Therapeutic activities;

## 2024-02-22 ENCOUNTER — HOSPITAL ENCOUNTER (OUTPATIENT)
Dept: PHYSICAL THERAPY | Age: 79
Setting detail: RECURRING SERIES
Discharge: HOME | End: 2024-02-25
Payer: MEDICARE

## 2024-02-22 PROCEDURE — 97110 THERAPEUTIC EXERCISES: CPT

## 2024-02-22 ASSESSMENT — PAIN DESCRIPTION - LOCATION: LOCATION: HIP

## 2024-02-22 ASSESSMENT — PAIN SCALES - GENERAL: PAINLEVEL_OUTOF10: 3

## 2024-02-22 ASSESSMENT — PAIN DESCRIPTION - ORIENTATION: ORIENTATION: LEFT

## 2024-02-22 NOTE — PROGRESS NOTES
Meredith Toribio  : 1945  Primary: Medicare Part A And B (Medicare)  Secondary:  [facility] @ [address] [place]  [facility] [address]  Phone: [phone]  Fax: [phone] Plan Frequency: 1 time per week for 8-12 weeks    Plan of Care/Certification Expiration Date: 24        Plan of Care/Certification Expiration Date:  Plan of Care/Certification Expiration Date: 24    Frequency/Duration:   Plan Frequency: 1 time per week for 8-12 weeks      Time In/Out:    Time In: 1300  Time Out: 1340      PT Visit Info:     Progress Note Due Date: 23  Total # of Visits to Date: 13  Progress Note Counter: 3  Canceled Appointment: 4 (24)      Visit Count:  13    OUTPATIENT PHYSICAL THERAPY:   Treatment Note 2024       Episode  (B hip OA)               Treatment Diagnosis:    No data found  Pain in left hip (M25.552)  Stiffness of Left Hip, Not elsewhere classified (M25.652)  Pain in Right Hip (M25.551)  Stiffness of Right Hip, Not elsewhere classified (M25.651)  Difficulty in walking, Not elsewhere classified (R26.2)  Other abnormalities of gait and mobility (R26.89)  Generalized Muscle Weakness (M62.81)  History of falling (Z91.81)  Medical/Referring Diagnosis:      M16.0 (ICD-10-CM) - Bilateral primary osteoarthritis of hip   Referring Physician:  Skyler Abarca PA-C;Walter Rojas MD MD Orders:  PT Eval and Treat   Return MD Appt:    Date of Onset:  Onset Date:  (chronic)     Allergies:   Latex, Articaine, Donepezil, Epinephrine, Erythromycin, Lexapro [escitalopram], Penicillins, Cephalexin, Chlorzoxazone, Clindamycin, Gabapentin, and Tramadol  Restrictions/Precautions:   History of falls      Interventions Planned (Treatment may consist of any combination of the following):  Current Treatment Recommendations: Strengthening; ROM; Balance training; Functional mobility training; Transfer training; Dry needling; Therapeutic activities;

## 2024-03-06 ENCOUNTER — HOSPITAL ENCOUNTER (OUTPATIENT)
Dept: PHYSICAL THERAPY | Age: 79
Setting detail: RECURRING SERIES
Discharge: HOME | End: 2024-03-09
Payer: MEDICARE

## 2024-03-06 PROCEDURE — 97110 THERAPEUTIC EXERCISES: CPT

## 2024-03-06 ASSESSMENT — PAIN DESCRIPTION - ORIENTATION: ORIENTATION: LEFT;RIGHT

## 2024-03-06 ASSESSMENT — PAIN SCALES - GENERAL: PAINLEVEL_OUTOF10: 3

## 2024-03-06 ASSESSMENT — PAIN DESCRIPTION - LOCATION: LOCATION: HIP

## 2024-03-06 NOTE — PROGRESS NOTES
Meredith Toribio  : 1945  Primary: Medicare Part A And B (Medicare)  Secondary:  [facility] @ [address] [place] [place]  [place] [address]  Phone: [phone]  Fax: [phone] Plan Frequency: 1 time per week for 8-12 weeks    Plan of Care/Certification Expiration Date: 24        Plan of Care/Certification Expiration Date:  Plan of Care/Certification Expiration Date: 24    Frequency/Duration:   Plan Frequency: 1 time per week for 8-12 weeks      Time In/Out:    Time In: 1345  Time Out: 1430      PT Visit Info:     Progress Note Due Date: 23  Total # of Visits to Date: 14  Progress Note Counter: 4  Canceled Appointment: 4 (24)      Visit Count:  14    OUTPATIENT PHYSICAL THERAPY:   Treatment Note 3/6/2024       Episode  (B hip OA)               Treatment Diagnosis:    No data found  Pain in left hip (M25.552)  Stiffness of Left Hip, Not elsewhere classified (M25.652)  Pain in Right Hip (M25.551)  Stiffness of Right Hip, Not elsewhere classified (M25.651)  Difficulty in walking, Not elsewhere classified (R26.2)  Other abnormalities of gait and mobility (R26.89)  Generalized Muscle Weakness (M62.81)  History of falling (Z91.81)  Medical/Referring Diagnosis:      M16.0 (ICD-10-CM) - Bilateral primary osteoarthritis of hip   Referring Physician:  Skyler Abarca PA-C;Walter Rojas MD MD Orders:  PT Eval and Treat   Return MD Appt:    Date of Onset:  Onset Date:  (chronic)     Allergies:   Latex, Articaine, Donepezil, Epinephrine, Erythromycin, Lexapro [escitalopram], Penicillins, Cephalexin, Chlorzoxazone, Clindamycin, Gabapentin, and Tramadol  Restrictions/Precautions:   History of falls      Interventions Planned (Treatment may consist of any combination of the following):  Current Treatment Recommendations: Strengthening; ROM; Balance training; Functional mobility training; Transfer training; Dry needling; Therapeutic activities;

## 2024-03-14 ENCOUNTER — APPOINTMENT (RX ONLY)
Dept: URBAN - METROPOLITAN AREA CLINIC 330 | Facility: CLINIC | Age: 79
Setting detail: DERMATOLOGY
End: 2024-03-14

## 2024-03-14 DIAGNOSIS — L82.0 INFLAMED SEBORRHEIC KERATOSIS: ICD-10-CM

## 2024-03-14 DIAGNOSIS — D69.2 OTHER NONTHROMBOCYTOPENIC PURPURA: ICD-10-CM

## 2024-03-14 PROCEDURE — 99212 OFFICE O/P EST SF 10 MIN: CPT | Mod: 25

## 2024-03-14 PROCEDURE — ? LIQUID NITROGEN

## 2024-03-14 PROCEDURE — 17110 DESTRUCTION B9 LES UP TO 14: CPT

## 2024-03-14 PROCEDURE — ? ADDITIONAL NOTES

## 2024-03-14 PROCEDURE — ? COUNSELING

## 2024-03-14 ASSESSMENT — LOCATION DETAILED DESCRIPTION DERM
LOCATION DETAILED: LEFT MEDIAL MALAR CHEEK
LOCATION DETAILED: LEFT INFERIOR CENTRAL MALAR CHEEK
LOCATION DETAILED: LEFT PROXIMAL DORSAL FOREARM
LOCATION DETAILED: RIGHT MEDIAL MALAR CHEEK
LOCATION DETAILED: RIGHT PROXIMAL DORSAL FOREARM

## 2024-03-14 ASSESSMENT — LOCATION ZONE DERM
LOCATION ZONE: FACE
LOCATION ZONE: ARM

## 2024-03-14 ASSESSMENT — LOCATION SIMPLE DESCRIPTION DERM
LOCATION SIMPLE: LEFT FOREARM
LOCATION SIMPLE: RIGHT FOREARM
LOCATION SIMPLE: RIGHT CHEEK
LOCATION SIMPLE: LEFT CHEEK

## 2024-03-14 NOTE — PROCEDURE: ADDITIONAL NOTES
Render Risk Assessment In Note?: no
Additional Notes: Pt to moisturize. States she’s been feeling weak. Pt should consult with PCP for further work up. Advised these purpura are normal for the location/patient age and are unlikely to be a sign of underlying disorder.
Detail Level: Simple

## 2024-03-18 ENCOUNTER — HOSPITAL ENCOUNTER (OUTPATIENT)
Dept: PHYSICAL THERAPY | Age: 79
Setting detail: RECURRING SERIES
Discharge: HOME | End: 2024-03-21
Payer: MEDICARE

## 2024-03-18 PROCEDURE — 97110 THERAPEUTIC EXERCISES: CPT

## 2024-03-18 ASSESSMENT — PAIN DESCRIPTION - ORIENTATION: ORIENTATION: RIGHT

## 2024-03-18 ASSESSMENT — PAIN DESCRIPTION - LOCATION: LOCATION: HIP

## 2024-03-18 NOTE — THERAPY DISCHARGE
Meredith Toribio  : 1945  Primary: Medicare Part A And B (Medicare)  Secondary:  [facility] @ [address] [place]  [facility] [address]  Phone: [phone]  Fax: [phone] Plan Frequency: 1 time per week for 8-12 weeks    Plan of Care/Certification Expiration Date: 24      PT Visit Info:   Plan Frequency: 1 time per week for 8-12 weeks  Plan of Care/Certification Expiration Date: 24  Total # of Visits to Date: 15  Progress Note Counter: 5  Progress Note Due Date: 23  Canceled Appointment: 4 (24)      Visit Count:  15                OUTPATIENT PHYSICAL THERAPY:             Discharge Summary 3/18/2024               Episode (B hip OA) Appt Desk         Treatment Diagnosis:  Pain in left hip (M25.552)  Stiffness of Left Hip, Not elsewhere classified (M25.652)  Pain in Right Hip (M25.551)  Stiffness of Right Hip, Not elsewhere classified (M25.651)  Difficulty in walking, Not elsewhere classified (R26.2)  Other abnormalities of gait and mobility (R26.89)  Generalized Muscle Weakness (M62.81)  History of falling (Z91.81)  Medical/Referring Diagnosis:  No admission diagnoses are documented for this encounter.  M16.0 (ICD-10-CM) - Bilateral primary osteoarthritis of hip  Referring Physician:  Skyler Abarca PA-C; Walter Rojas MD MD Orders:  PT Eval and Treat   Return MD Appt:    Date of Onset:  Onset Date:  (chronic)      Allergies:  Latex, Articaine, Donepezil, Epinephrine, Erythromycin, Lexapro [escitalopram], Penicillins, Cephalexin, Chlorzoxazone, Clindamycin, Gabapentin, and Tramadol  Restrictions/Precautions:  history of falls         Medications Last Reviewed:  3/18/2024     SUBJECTIVE   History of Injury/Illness (Reason for Referral):    23: Pt returning today to continue PT. Chiropractor has been working on hips and low back.   No falls but had a near fall in bathroom when caught foot on towel on floor. Caught her

## 2024-03-18 NOTE — PROGRESS NOTES
Meredith Toribio  : 1945  Primary: Medicare Part A And B (Medicare)  Secondary:  [facility] @ [address] [place]  [facility] [address]  Phone: [phone]  Fax: [phone] Plan Frequency: 1 time per week for 8-12 weeks    Plan of Care/Certification Expiration Date: 24        Plan of Care/Certification Expiration Date:  Plan of Care/Certification Expiration Date: 24    Frequency/Duration:   Plan Frequency: 1 time per week for 8-12 weeks      Time In/Out:     Time In: 1100  Time Out: 1145      PT Visit Info:      Progress Note Due Date: 23  Total # of Visits to Date: 15  Progress Note Counter: 5  Canceled Appointment: 4 (24)      Visit Count:  15    OUTPATIENT PHYSICAL THERAPY:   Treatment Note 3/18/2024       Episode  (B hip OA)               Treatment Diagnosis:    No data found  Pain in left hip (M25.552)  Stiffness of Left Hip, Not elsewhere classified (M25.652)  Pain in Right Hip (M25.551)  Stiffness of Right Hip, Not elsewhere classified (M25.651)  Difficulty in walking, Not elsewhere classified (R26.2)  Other abnormalities of gait and mobility (R26.89)  Generalized Muscle Weakness (M62.81)  History of falling (Z91.81)  Medical/Referring Diagnosis:      M16.0 (ICD-10-CM) - Bilateral primary osteoarthritis of hip   Referring Physician:  Skyler Abarca PA-C;Walter Rojas MD MD Orders:  PT Eval and Treat   Return MD Appt:    Date of Onset:  Onset Date:  (chronic)     Allergies:   Latex, Articaine, Donepezil, Epinephrine, Erythromycin, Lexapro [escitalopram], Penicillins, Cephalexin, Chlorzoxazone, Clindamycin, Gabapentin, and Tramadol  Restrictions/Precautions:   History of falls      Interventions Planned (Treatment may consist of any combination of the following):  Current Treatment Recommendations: Strengthening; ROM; Balance training; Functional mobility training; Transfer training; Dry needling; Therapeutic activities;

## 2024-03-28 DIAGNOSIS — I10 ESSENTIAL (PRIMARY) HYPERTENSION: ICD-10-CM

## 2024-03-29 NOTE — TELEPHONE ENCOUNTER
[facility] requesting Metoprolol 25 mg     Last filled on 1/15/24 with 180 and 0 refills.     Next appt 5/13/24

## 2024-04-11 ENCOUNTER — TELEPHONE (OUTPATIENT)
Dept: FAMILY MEDICINE CLINIC | Facility: CLINIC | Age: 79
End: 2024-04-11

## 2024-04-11 NOTE — TELEPHONE ENCOUNTER
Mrs. Toribio called to request a medication, but when I was trying to get information from her I wasn't getting a response from her.  She stated that she was in a hurry to go to an appointment.  There were someone else in the background talking to her at the same time.  I tried several times to get her to answer my questions, but she kept talking to someone else.  She ended up hanging up the call. I wasn't able to confirm her phone number.    She would like a call back from  Tatitlek Yamilka.

## 2024-05-20 ENCOUNTER — TELEPHONE (OUTPATIENT)
Dept: FAMILY MEDICINE CLINIC | Facility: CLINIC | Age: 79
End: 2024-05-20

## 2024-05-20 NOTE — TELEPHONE ENCOUNTER
A month ago she had high blood pressure.  She went to the psychiatrist and they suggested a cardiogram. She got the cardiogram.     Patient's blood pressure at 11:00 am was 164/101.        10:00 175/94 P 88    10:30 152/94     Blood pressure is going up and down.  She was placed on Rispirdol and went off the medication.  She spoke the the psychiatrists nurse which is     She has an appointment on 5/23/24.     She has been taking Risperdal 0.5 mg twice a day.      Has a headache, but no SOB or chest pain.     On a Cpap machine that is not working.      She is inquiring what to do in the meantime.

## 2024-05-20 NOTE — TELEPHONE ENCOUNTER
The patient states that 181/102.      She took it later and the blood pressure came down to 137/94.    The patient was informed that if she has SOB, chest pain, headache or bp of 180 too 200 to go to the ER.

## 2024-05-21 ENCOUNTER — HOSPITAL ENCOUNTER (EMERGENCY)
Age: 79
Discharge: HOME | End: 2024-05-21
Attending: EMERGENCY MEDICINE
Payer: MEDICARE

## 2024-05-21 VITALS
WEIGHT: 141 LBS | DIASTOLIC BLOOD PRESSURE: 80 MMHG | HEIGHT: 62 IN | SYSTOLIC BLOOD PRESSURE: 162 MMHG | BODY MASS INDEX: 25.95 KG/M2 | OXYGEN SATURATION: 96 % | RESPIRATION RATE: 16 BRPM | TEMPERATURE: 98.2 F | HEART RATE: 87 BPM

## 2024-05-21 DIAGNOSIS — T88.7XXA MEDICATION SIDE EFFECT: Primary | ICD-10-CM

## 2024-05-21 LAB
ALBUMIN SERPL-MCNC: 3.6 G/DL (ref 3.2–4.6)
ALBUMIN/GLOB SERPL: 1.6 (ref 1–1.9)
ALP SERPL-CCNC: 87 U/L (ref 35–104)
ALT SERPL-CCNC: 22 U/L (ref 12–65)
ANION GAP SERPL CALC-SCNC: 12 MMOL/L (ref 9–18)
AST SERPL-CCNC: 18 U/L (ref 15–37)
BASOPHILS # BLD: 0 K/UL (ref 0–0.2)
BASOPHILS NFR BLD: 0 % (ref 0–2)
BILIRUB SERPL-MCNC: 0.5 MG/DL (ref 0–1.2)
BUN SERPL-MCNC: 7 MG/DL (ref 8–23)
CALCIUM SERPL-MCNC: 9.2 MG/DL (ref 8.8–10.2)
CHLORIDE SERPL-SCNC: 100 MMOL/L (ref 98–107)
CO2 SERPL-SCNC: 23 MMOL/L (ref 20–28)
CREAT SERPL-MCNC: 0.65 MG/DL (ref 0.6–1.1)
DIFFERENTIAL METHOD BLD: ABNORMAL
EOSINOPHIL # BLD: 0.1 K/UL (ref 0–0.8)
EOSINOPHIL NFR BLD: 1 % (ref 0.5–7.8)
ERYTHROCYTE [DISTWIDTH] IN BLOOD BY AUTOMATED COUNT: 11.9 % (ref 11.9–14.6)
GLOBULIN SER CALC-MCNC: 2.3 G/DL (ref 2.3–3.5)
GLUCOSE SERPL-MCNC: 100 MG/DL (ref 70–99)
HCT VFR BLD AUTO: 39 % (ref 35.8–46.3)
HGB BLD-MCNC: 14.3 G/DL (ref 11.7–15.4)
IMM GRANULOCYTES # BLD AUTO: 0.1 K/UL (ref 0–0.5)
IMM GRANULOCYTES NFR BLD AUTO: 1 % (ref 0–5)
LYMPHOCYTES # BLD: 1.3 K/UL (ref 0.5–4.6)
LYMPHOCYTES NFR BLD: 17 % (ref 13–44)
MAGNESIUM SERPL-MCNC: 2.2 MG/DL (ref 1.8–2.4)
MCH RBC QN AUTO: 33.4 PG (ref 26.1–32.9)
MCHC RBC AUTO-ENTMCNC: 36.7 G/DL (ref 31.4–35)
MCV RBC AUTO: 91.1 FL (ref 82–102)
MONOCYTES # BLD: 0.6 K/UL (ref 0.1–1.3)
MONOCYTES NFR BLD: 7 % (ref 4–12)
NEUTS SEG # BLD: 5.8 K/UL (ref 1.7–8.2)
NEUTS SEG NFR BLD: 74 % (ref 43–78)
NRBC # BLD: 0 K/UL (ref 0–0.2)
PLATELET # BLD AUTO: 220 K/UL (ref 150–450)
PMV BLD AUTO: 7.9 FL (ref 9.4–12.3)
POTASSIUM SERPL-SCNC: 3.7 MMOL/L (ref 3.5–5.1)
PROT SERPL-MCNC: 6 G/DL (ref 6.3–8.2)
RBC # BLD AUTO: 4.28 M/UL (ref 4.05–5.2)
SODIUM SERPL-SCNC: 135 MMOL/L (ref 136–145)
T4 FREE SERPL-MCNC: 1.4 NG/DL (ref 0.9–1.7)
TSH, 3RD GENERATION: 1.66 UIU/ML (ref 0.27–4.2)
WBC # BLD AUTO: 7.8 K/UL (ref 4.3–11.1)

## 2024-05-21 PROCEDURE — 84439 ASSAY OF FREE THYROXINE: CPT

## 2024-05-21 PROCEDURE — 99283 EMERGENCY DEPT VISIT LOW MDM: CPT

## 2024-05-21 PROCEDURE — 80053 COMPREHEN METABOLIC PANEL: CPT

## 2024-05-21 PROCEDURE — 83735 ASSAY OF MAGNESIUM: CPT

## 2024-05-21 PROCEDURE — 84443 ASSAY THYROID STIM HORMONE: CPT

## 2024-05-21 PROCEDURE — 85025 COMPLETE CBC W/AUTO DIFF WBC: CPT

## 2024-05-21 RX ORDER — KETOROLAC TROMETHAMINE 15 MG/ML
15 INJECTION, SOLUTION INTRAMUSCULAR; INTRAVENOUS ONCE
Status: DISCONTINUED | OUTPATIENT
Start: 2024-05-21 | End: 2024-05-21 | Stop reason: HOSPADM

## 2024-05-21 ASSESSMENT — ENCOUNTER SYMPTOMS
COUGH: 0
DIARRHEA: 1
NAUSEA: 1
ABDOMINAL PAIN: 0
VOMITING: 0
SORE THROAT: 0
BACK PAIN: 0
SHORTNESS OF BREATH: 0

## 2024-05-21 ASSESSMENT — PAIN SCALES - GENERAL: PAINLEVEL_OUTOF10: 4

## 2024-05-21 ASSESSMENT — PAIN - FUNCTIONAL ASSESSMENT: PAIN_FUNCTIONAL_ASSESSMENT: 0-10

## 2024-05-21 ASSESSMENT — PAIN DESCRIPTION - LOCATION: LOCATION: BACK;HEAD

## 2024-05-21 NOTE — ED PROVIDER NOTES
Monocytes % 7 4.0 - 12.0 %    Eosinophils % 1 0.5 - 7.8 %    Basophils % 0 0.0 - 2.0 %    Immature Granulocytes % 1 0.0 - 5.0 %    Neutrophils Absolute 5.8 1.7 - 8.2 K/UL    Lymphocytes Absolute 1.3 0.5 - 4.6 K/UL    Monocytes Absolute 0.6 0.1 - 1.3 K/UL    Eosinophils Absolute 0.1 0.0 - 0.8 K/UL    Basophils Absolute 0.0 0.0 - 0.2 K/UL    Immature Granulocytes Absolute 0.1 0.0 - 0.5 K/UL   Comprehensive Metabolic Panel    Collection Time: 05/21/24  5:06 PM   Result Value Ref Range    Sodium 135 (L) 136 - 145 mmol/L    Potassium 3.7 3.5 - 5.1 mmol/L    Chloride 100 98 - 107 mmol/L    CO2 23 20 - 28 mmol/L    Anion Gap 12 9 - 18 mmol/L    Glucose 100 (H) 70 - 99 mg/dL    BUN 7 (L) 8 - 23 MG/DL    Creatinine 0.65 0.60 - 1.10 MG/DL    Est, Glom Filt Rate >90 >60 ml/min/1.73m2    Calcium 9.2 8.8 - 10.2 MG/DL    Total Bilirubin 0.5 0.0 - 1.2 MG/DL    ALT 22 12 - 65 U/L    AST 18 15 - 37 U/L    Alk Phosphatase 87 35 - 104 U/L    Total Protein 6.0 (L) 6.3 - 8.2 g/dL    Albumin 3.6 3.2 - 4.6 g/dL    Globulin 2.3 2.3 - 3.5 g/dL    Albumin/Globulin Ratio 1.6 1.0 - 1.9     Magnesium    Collection Time: 05/21/24  5:06 PM   Result Value Ref Range    Magnesium 2.2 1.8 - 2.4 mg/dL   T4, Free    Collection Time: 05/21/24  5:06 PM   Result Value Ref Range    T4 Free 1.4 0.9 - 1.7 NG/DL   TSH    Collection Time: 05/21/24  5:06 PM   Result Value Ref Range    TSH, 3rd Generation 1.660 0.270 - 4.200 uIU/mL          No orders to display                    ED Course as of 05/21/24 1829   Tue May 21, 2024   1820 Patient is resting comfortably in bed.  Blood pressure is 130 over 80s on the monitor.  I explained the results and plan.  Patient and her brother are comfortable with discharge [CW]      ED Course User Index  [CW] Benjamin Lock MD       I have considered all emergent medical conditions that could have caused patient's presentation to the emergency department today.  Based on their history, physical, and thorough evaluation,

## 2024-05-21 NOTE — ED TRIAGE NOTES
Pt reports under a lot of stress. Started on risperidone 5/16/24. Pt started having headaches, weakness, dizziness after being on medication for four days. Pt feeling progressively worse since. Pt concerned about labile BP. Reports nausea, lack of appetite, diarrhea. Pt denies urinary sx, vomiting, f/c.

## 2024-05-21 NOTE — DISCHARGE INSTRUCTIONS
Stop taking the Risperdal immediately.  Take 5 mg of olanzapine this evening and then resume your normal dose of olanzapine tomorrow.

## 2024-05-23 ENCOUNTER — OFFICE VISIT (OUTPATIENT)
Dept: FAMILY MEDICINE CLINIC | Facility: CLINIC | Age: 79
End: 2024-05-23

## 2024-05-23 VITALS
OXYGEN SATURATION: 95 % | DIASTOLIC BLOOD PRESSURE: 80 MMHG | BODY MASS INDEX: 25.42 KG/M2 | HEART RATE: 81 BPM | TEMPERATURE: 98.1 F | HEIGHT: 62 IN | WEIGHT: 138.13 LBS | SYSTOLIC BLOOD PRESSURE: 136 MMHG

## 2024-05-23 DIAGNOSIS — F41.1 GAD (GENERALIZED ANXIETY DISORDER): ICD-10-CM

## 2024-05-23 DIAGNOSIS — F22 PARANOID BEHAVIOR (HCC): ICD-10-CM

## 2024-05-23 DIAGNOSIS — G31.84 MILD COGNITIVE IMPAIRMENT: ICD-10-CM

## 2024-05-23 DIAGNOSIS — F31.70 BIPOLAR AFFECTIVE DISORDER IN REMISSION (HCC): ICD-10-CM

## 2024-05-23 DIAGNOSIS — G89.29 CHRONIC LOW BACK PAIN, UNSPECIFIED BACK PAIN LATERALITY, UNSPECIFIED WHETHER SCIATICA PRESENT: ICD-10-CM

## 2024-05-23 DIAGNOSIS — I10 ESSENTIAL (PRIMARY) HYPERTENSION: Primary | ICD-10-CM

## 2024-05-23 DIAGNOSIS — G47.33 OSA (OBSTRUCTIVE SLEEP APNEA): ICD-10-CM

## 2024-05-23 DIAGNOSIS — M54.50 CHRONIC LOW BACK PAIN, UNSPECIFIED BACK PAIN LATERALITY, UNSPECIFIED WHETHER SCIATICA PRESENT: ICD-10-CM

## 2024-05-23 PROBLEM — J45.20 MILD INTERMITTENT ASTHMA WITHOUT COMPLICATION: Status: ACTIVE | Noted: 2021-05-27

## 2024-05-23 PROBLEM — Z87.891 HISTORY OF SMOKING 30 OR MORE PACK YEARS: Status: ACTIVE | Noted: 2021-12-07

## 2024-05-23 PROBLEM — E88.89 STEATOSIS (HCC): Status: ACTIVE | Noted: 2021-12-07

## 2024-05-23 PROBLEM — R49.0 CHRONIC HOARSENESS: Status: ACTIVE | Noted: 2024-04-18

## 2024-05-23 PROBLEM — J34.1 CYST, NASAL SINUS: Status: ACTIVE | Noted: 2018-10-25

## 2024-05-23 PROBLEM — R42 DIZZINESS: Status: ACTIVE | Noted: 2020-07-07

## 2024-05-23 PROBLEM — R13.13 PHARYNGEAL DYSPHAGIA: Status: ACTIVE | Noted: 2024-04-17

## 2024-05-23 PROBLEM — R06.02 SOB (SHORTNESS OF BREATH): Status: ACTIVE | Noted: 2021-05-06

## 2024-05-23 PROBLEM — R07.0 PAIN IN THROAT: Status: ACTIVE | Noted: 2024-04-18

## 2024-05-23 PROBLEM — H93.11 TINNITUS OF RIGHT EAR: Status: ACTIVE | Noted: 2018-10-25

## 2024-05-23 PROBLEM — J34.89 NASAL VESTIBULITIS: Status: ACTIVE | Noted: 2018-09-20

## 2024-05-23 PROBLEM — Z79.899 LONG TERM CURRENT USE OF ANTIPSYCHOTIC MEDICATION: Status: ACTIVE | Noted: 2024-04-19

## 2024-05-23 PROBLEM — J44.9 CHRONIC OBSTRUCTIVE PULMONARY DISEASE, UNSPECIFIED COPD TYPE (HCC): Status: ACTIVE | Noted: 2021-05-06

## 2024-05-23 RX ORDER — RISPERIDONE 0.5 MG/1
0.5 TABLET ORAL 2 TIMES DAILY
COMMUNITY
Start: 2024-05-14

## 2024-05-23 RX ORDER — RISPERIDONE 1 MG/1
1 TABLET ORAL 2 TIMES DAILY
COMMUNITY
Start: 2024-05-14 | End: 2024-06-13

## 2024-05-23 RX ORDER — MELOXICAM 7.5 MG/1
7.5 TABLET ORAL DAILY
Qty: 30 TABLET | Refills: 1 | Status: SHIPPED | OUTPATIENT
Start: 2024-05-23

## 2024-05-23 SDOH — ECONOMIC STABILITY: INCOME INSECURITY: HOW HARD IS IT FOR YOU TO PAY FOR THE VERY BASICS LIKE FOOD, HOUSING, MEDICAL CARE, AND HEATING?: PATIENT DECLINED

## 2024-05-23 SDOH — ECONOMIC STABILITY: HOUSING INSECURITY
IN THE LAST 12 MONTHS, WAS THERE A TIME WHEN YOU DID NOT HAVE A STEADY PLACE TO SLEEP OR SLEPT IN A SHELTER (INCLUDING NOW)?: PATIENT DECLINED

## 2024-05-23 SDOH — ECONOMIC STABILITY: FOOD INSECURITY: WITHIN THE PAST 12 MONTHS, THE FOOD YOU BOUGHT JUST DIDN'T LAST AND YOU DIDN'T HAVE MONEY TO GET MORE.: PATIENT DECLINED

## 2024-05-23 SDOH — ECONOMIC STABILITY: FOOD INSECURITY: WITHIN THE PAST 12 MONTHS, YOU WORRIED THAT YOUR FOOD WOULD RUN OUT BEFORE YOU GOT MONEY TO BUY MORE.: PATIENT DECLINED

## 2024-05-23 NOTE — PROGRESS NOTES
Meredith Toribio (: 1945) is a 78 y.o. female, established patient, here for evaluation of the following chief complaint(s):  Follow-up (Sleep apnea)       ASSESSMENT/PLAN:  1. Essential (primary) hypertension  -     metoprolol tartrate (LOPRESSOR) 25 MG tablet; Take 1 tablet by mouth 2 times daily, Disp-180 tablet, R-0Normal  2. Bipolar affective disorder in remission (HCC)  Assessment & Plan:   Monitored by specialist- no acute findings meriting change in the plan  3. Paranoid behavior (HCC)  4. Mild cognitive impairment  5. LING (generalized anxiety disorder)  6. NICOLE (obstructive sleep apnea)  7. Chronic low back pain, unspecified back pain laterality, unspecified whether sciatica present  -     meloxicam (MOBIC) 7.5 MG tablet; Take 1 tablet by mouth daily, Disp-30 tablet, R-1Normal      SUBJECTIVE/OBJECTIVE:  HPI    Chronic F/U:     Last seen in office on 23 for AWV. Appears to follow with Lashell Ojeda for primary OA of both hips. Possibly follows with [facility] for NICOLE and asthma (last OV 3/28/24).     She also saw Neurology with [facility] on 24 for mild cognitive impairment- she had been experiencing increasing forgetfulness and trouble concentrating. Obtained MRI- left sylvian fissure and frontotemporal convexity arachnoid cyst.     She sees Psychiatry for LING, paranoid behavior, bipolar affective disorder, PTSD, and mild cognitive impairment. Last OV 24. At this point, she was placed on Risperdal. She was seen in the ER on  for headaches, weakness, and dizziness after starting the medication for 4 days. She was advised to stop and restart her Olanzapine.     At today's visit:     We discussed several chronic conditions including NICOLE, mild cognitive impairment, and her visits with psychiatry. She was concerned with her BP. Takes Metoprolol BID. BP stable in office today, will refill. She feels it was due to the Risperdal. She is no longer taking the

## 2024-06-04 ENCOUNTER — CARE COORDINATION (OUTPATIENT)
Dept: CARE COORDINATION | Facility: CLINIC | Age: 79
End: 2024-06-04

## 2024-06-04 NOTE — CARE COORDINATION
Ambulatory Care Management Outreach Attempt    This patient was received as a referral from  [facility] for case management of Medication Reaction.    Attempted to reach patient for ED follow up. Unable to reach patient, left voice message and contact information. Will follow up next business day.      Patient: Meredith Toribio Patient : 1945 MRN: [id number]    Last Discharge Facility       Date Complaint Diagnosis Description Type Department Provider    24 Medication Reaction; Dizziness; Hypertension Medication side effect ED (DISCHARGE) Benjamin Zavaleta MD                Noted following upcoming appointments from discharge chart review:   [facility] follow up appointment(s):   Future Appointments   Date Time Provider Department Center   2024  1:30 PM SFE US LOGIC 7 SFERUS SFE   2024  3:00 PM Sandee Haynes APRN - CNP PVF GVL AMB     Non-[facility] follow up appointment(s):

## 2024-06-05 ENCOUNTER — CARE COORDINATION (OUTPATIENT)
Dept: CARE COORDINATION | Facility: CLINIC | Age: 79
End: 2024-06-05

## 2024-06-05 NOTE — CARE COORDINATION
This patient was received as a referral from Population health report .  Follow up after ED visit.     LPN CC contacted the patient by telephone. Verified name and  with patient as identifiers. Provided introduction to self, and explanation of the LPN CC role.   Patient declined care management services at this time.          Last Discharge Facility       Date Complaint Diagnosis Description Type Department Provider    24 Medication Reaction; Dizziness; Hypertension Medication side effect ED (DISCHARGE) Benjamin Zavaleta MD              Noted following upcoming appointments from discharge chart review:   [facility] follow up appointment(s):   Future Appointments   Date Time Provider Department Center   2024  1:30 PM SFE US LOGIC 7 SFERUS SFE   2024  3:00 PM Sandee Haynes APRN - CNP PVF GVL AMB     Non-[facility] follow up appointment(s):

## 2024-07-01 ENCOUNTER — TELEPHONE (OUTPATIENT)
Dept: OBGYN CLINIC | Age: 79
End: 2024-07-01

## 2024-07-01 NOTE — TELEPHONE ENCOUNTER
Spoke with pt and she is not sure about making an appointment, she is wanting a breast exam and thinks she may still have  lump on her labia. Advised I can make her an appointment anytime she would like to have those things checked. She will call back to schedule.

## 2024-07-01 NOTE — TELEPHONE ENCOUNTER
Pt called w/ questions about her annual exam - advised last annual 8/16/23 and attempted to get pt scheduled for annual after 8/17/24. Pt states she \"wasn't sure what to do\". I asked pt if she had a concern/problem that she was needing to be seen for. Pt stated \"she wanted to talk to 's nurse\". I attempted to see if there was something I could help the pt w/ but she stated \"she only wanted to speak with the nurse\". Advised I would send a message to 's MA's and someone would give her a call.

## 2024-07-15 ENCOUNTER — OFFICE VISIT (OUTPATIENT)
Dept: FAMILY MEDICINE CLINIC | Facility: CLINIC | Age: 79
End: 2024-07-15
Payer: MEDICARE

## 2024-07-15 VITALS
SYSTOLIC BLOOD PRESSURE: 142 MMHG | WEIGHT: 132.25 LBS | HEIGHT: 62 IN | TEMPERATURE: 98.6 F | HEART RATE: 81 BPM | DIASTOLIC BLOOD PRESSURE: 90 MMHG | BODY MASS INDEX: 24.34 KG/M2 | OXYGEN SATURATION: 96 %

## 2024-07-15 DIAGNOSIS — R60.0 LOCALIZED EDEMA: Primary | ICD-10-CM

## 2024-07-15 DIAGNOSIS — F41.1 GAD (GENERALIZED ANXIETY DISORDER): ICD-10-CM

## 2024-07-15 DIAGNOSIS — F31.70 BIPOLAR AFFECTIVE DISORDER IN REMISSION (HCC): ICD-10-CM

## 2024-07-15 DIAGNOSIS — J44.89 COPD WITH ASTHMA (HCC): ICD-10-CM

## 2024-07-15 DIAGNOSIS — Z79.899 LONG TERM CURRENT USE OF ANTIPSYCHOTIC MEDICATION: ICD-10-CM

## 2024-07-15 PROCEDURE — 99215 OFFICE O/P EST HI 40 MIN: CPT | Performed by: FAMILY MEDICINE

## 2024-07-15 PROCEDURE — G8427 DOCREV CUR MEDS BY ELIG CLIN: HCPCS | Performed by: FAMILY MEDICINE

## 2024-07-15 PROCEDURE — 1036F TOBACCO NON-USER: CPT | Performed by: FAMILY MEDICINE

## 2024-07-15 PROCEDURE — 1090F PRES/ABSN URINE INCON ASSESS: CPT | Performed by: FAMILY MEDICINE

## 2024-07-15 PROCEDURE — G8400 PT W/DXA NO RESULTS DOC: HCPCS | Performed by: FAMILY MEDICINE

## 2024-07-15 PROCEDURE — 3077F SYST BP >= 140 MM HG: CPT | Performed by: FAMILY MEDICINE

## 2024-07-15 PROCEDURE — 3023F SPIROM DOC REV: CPT | Performed by: FAMILY MEDICINE

## 2024-07-15 PROCEDURE — G8420 CALC BMI NORM PARAMETERS: HCPCS | Performed by: FAMILY MEDICINE

## 2024-07-15 PROCEDURE — 1123F ACP DISCUSS/DSCN MKR DOCD: CPT | Performed by: FAMILY MEDICINE

## 2024-07-15 PROCEDURE — 3080F DIAST BP >= 90 MM HG: CPT | Performed by: FAMILY MEDICINE

## 2024-07-15 NOTE — PROGRESS NOTES
units capsule Take 400 Units by mouth daily      risperiDONE (RISPERDAL) 1 MG tablet Take 1 tablet by mouth 2 times daily       No current facility-administered medications for this visit.       Social History     Tobacco Use    Smoking status: Former     Current packs/day: 0.00     Average packs/day: 2.0 packs/day for 15.0 years (30.0 ttl pk-yrs)     Types: Cigarettes     Start date: 1996     Quit date: 2011     Years since quittin.5    Smokeless tobacco: Never   Vaping Use    Vaping Use: Never used   Substance Use Topics    Alcohol use: No    Drug use: No         Vitals: BP (!) 142/90   Pulse 81   Temp 98.6 °F (37 °C)   Ht 1.575 m (5' 2\")   Wt 60 kg (132 lb 4 oz)   SpO2 96%   BMI 24.19 kg/m²     Physical Exam  Constitutional:       General: She is not in acute distress.     Appearance: She is normal weight.   Cardiovascular:      Rate and Rhythm: Normal rate and regular rhythm.      Heart sounds: No murmur heard.     No friction rub. No gallop.   Musculoskeletal:      Right lower leg: Edema (2+) present.      Left lower leg: Edema (3+) present.   Skin:     General: Skin is warm and dry.   Neurological:      Mental Status: She is alert. She is disoriented.          Assessment/Plan: Meredith Toribio was seen today for Foot Swelling (Podiatry said it could be heart failure, got scared  ) and Other (Is having trouble with psychiatry. Would like to see Dr. Boogie  )     1. Localized edema  -     D-Dimer, Quantitative; Future  -     Ambulatory referral to Cardiac Testing  2. COPD with asthma (HCC)  3. Long term current use of antipsychotic medication  -     External Referral to Psychiatry  4. LING (generalized anxiety disorder)  -     External Referral to Psychiatry  5. Bipolar affective disorder in remission (HCC)  -     External Referral to Psychiatry        No follow-ups on file.     On 07/15/24 I have spent 45 minutes reviewing previous notes, test results and face to face with the patient (and

## 2024-07-16 ENCOUNTER — LAB (OUTPATIENT)
Dept: FAMILY MEDICINE CLINIC | Facility: CLINIC | Age: 79
End: 2024-07-16

## 2024-07-16 DIAGNOSIS — R60.0 LOCALIZED EDEMA: ICD-10-CM

## 2024-07-16 LAB — D DIMER PPP FEU-MCNC: 0.46 UG/ML(FEU)

## 2024-07-22 ENCOUNTER — TELEPHONE (OUTPATIENT)
Dept: FAMILY MEDICINE CLINIC | Facility: CLINIC | Age: 79
End: 2024-07-22

## 2024-07-22 NOTE — TELEPHONE ENCOUNTER
Patient called wanting to speak to the doctor about her swollen feet. Patient could not find her tape measure to tell me how swollen her feet are. Advised patient she would need an appointment. She informed me she communicated this to the doctor when she was her. Patient would like to now if provider believed in trying Natural medicines before pharmaceutical drugs. Patient think she may need to see a podiatrist and would need a referral.

## 2024-07-23 NOTE — TELEPHONE ENCOUNTER
This has been fully explained to the patient, who indicates understanding. Patient states her foot is too swollen to get her compression hose on. She will keep it propped up. She has spoken with [facility] (water aerobics) she said they have a program that is specific to edema.  Would like to know your thoughts on her doing this.

## 2024-07-24 ENCOUNTER — OFFICE VISIT (OUTPATIENT)
Dept: FAMILY MEDICINE CLINIC | Facility: CLINIC | Age: 79
End: 2024-07-24
Payer: MEDICARE

## 2024-07-24 VITALS
BODY MASS INDEX: 26.68 KG/M2 | WEIGHT: 145 LBS | TEMPERATURE: 97.9 F | HEIGHT: 62 IN | OXYGEN SATURATION: 97 % | SYSTOLIC BLOOD PRESSURE: 116 MMHG | DIASTOLIC BLOOD PRESSURE: 58 MMHG | HEART RATE: 103 BPM

## 2024-07-24 DIAGNOSIS — R06.02 SHORTNESS OF BREATH: ICD-10-CM

## 2024-07-24 DIAGNOSIS — Z79.899 LONG TERM CURRENT USE OF ANTIPSYCHOTIC MEDICATION: ICD-10-CM

## 2024-07-24 DIAGNOSIS — H00.012 HORDEOLUM EXTERNUM OF RIGHT LOWER EYELID: Primary | ICD-10-CM

## 2024-07-24 DIAGNOSIS — R60.0 EDEMA OF BOTH FEET: ICD-10-CM

## 2024-07-24 DIAGNOSIS — M54.50 CHRONIC LOW BACK PAIN, UNSPECIFIED BACK PAIN LATERALITY, UNSPECIFIED WHETHER SCIATICA PRESENT: ICD-10-CM

## 2024-07-24 DIAGNOSIS — G89.29 CHRONIC LOW BACK PAIN, UNSPECIFIED BACK PAIN LATERALITY, UNSPECIFIED WHETHER SCIATICA PRESENT: ICD-10-CM

## 2024-07-24 LAB
ALBUMIN SERPL-MCNC: 3.7 G/DL (ref 3.2–4.6)
ALBUMIN/GLOB SERPL: 1.5 (ref 1–1.9)
ALP SERPL-CCNC: 86 U/L (ref 35–104)
ALT SERPL-CCNC: 24 U/L (ref 12–65)
ANION GAP SERPL CALC-SCNC: 10 MMOL/L (ref 9–18)
AST SERPL-CCNC: 21 U/L (ref 15–37)
BILIRUB SERPL-MCNC: 0.4 MG/DL (ref 0–1.2)
BUN SERPL-MCNC: 15 MG/DL (ref 8–23)
CALCIUM SERPL-MCNC: 10 MG/DL (ref 8.8–10.2)
CHLORIDE SERPL-SCNC: 101 MMOL/L (ref 98–107)
CO2 SERPL-SCNC: 27 MMOL/L (ref 20–28)
CREAT SERPL-MCNC: 0.84 MG/DL (ref 0.6–1.1)
GLOBULIN SER CALC-MCNC: 2.6 G/DL (ref 2.3–3.5)
GLUCOSE SERPL-MCNC: 110 MG/DL (ref 70–99)
NT PRO BNP: 97 PG/ML (ref 0–450)
POTASSIUM SERPL-SCNC: 3.9 MMOL/L (ref 3.5–5.1)
PROT SERPL-MCNC: 6.3 G/DL (ref 6.3–8.2)
SODIUM SERPL-SCNC: 137 MMOL/L (ref 136–145)

## 2024-07-24 PROCEDURE — 1123F ACP DISCUSS/DSCN MKR DOCD: CPT | Performed by: FAMILY MEDICINE

## 2024-07-24 PROCEDURE — G8417 CALC BMI ABV UP PARAM F/U: HCPCS | Performed by: FAMILY MEDICINE

## 2024-07-24 PROCEDURE — 1036F TOBACCO NON-USER: CPT | Performed by: FAMILY MEDICINE

## 2024-07-24 PROCEDURE — 3074F SYST BP LT 130 MM HG: CPT | Performed by: FAMILY MEDICINE

## 2024-07-24 PROCEDURE — G8427 DOCREV CUR MEDS BY ELIG CLIN: HCPCS | Performed by: FAMILY MEDICINE

## 2024-07-24 PROCEDURE — 1090F PRES/ABSN URINE INCON ASSESS: CPT | Performed by: FAMILY MEDICINE

## 2024-07-24 PROCEDURE — G8400 PT W/DXA NO RESULTS DOC: HCPCS | Performed by: FAMILY MEDICINE

## 2024-07-24 PROCEDURE — 3078F DIAST BP <80 MM HG: CPT | Performed by: FAMILY MEDICINE

## 2024-07-24 NOTE — PROGRESS NOTES
108 (90 Base) MCG/ACT inhaler Inhale 2 puffs into the lungs every 4 hours as needed for Wheezing 18 g 0    NONFORMULARY Take 2 capsules by mouth daily. PQQ (pyrroloquinoline quinone disodium salt) 2 capsules by mouth daily      acetaminophen (TYLENOL) 500 MG tablet Take 1 tablet by mouth every 6 hours as needed for Pain      Magnesium 500 MG TABS Take 500 mg by mouth daily.      VITAMIN A PO Take 10,000 Units by mouth daily.      Bioflavonoid Products (FARIDA C PO) Take 1 tablet by mouth daily.      budesonide-formoterol (SYMBICORT) 80-4.5 MCG/ACT AERO Inhale 2 puffs into the lungs 2 times daily as needed (shortness of breath)      NONFORMULARY Take 500 mg by mouth daily. Epicor. Takes 2-3 tabs.      NONFORMULARY Take 1 tablet by mouth daily. ATP Co-factor.      Orange Peel (D-LIMONENE PO) Take 1 tablet by mouth daily.      ketoconazole (NIZORAL) 2 % cream APPLY TWICE DAILY FOR TWO WEEKS THEN 1-2 TIMES WEEKLY FOR MAINTENANCE      DHEA 50 MG CAPS Take 100 mg by mouth every morning      Coenzyme Q10 (COQ10 PO) Take 1 tablet by mouth 2 times daily      CPAP Machine MISC by Other route      Iodine, Kelp, 0.15 MG TABS Take 12.5 mg by mouth daily      Lactobacillus Acidophilus POWD Take 2 capsules by mouth daily      Polyethylene Glycol 1000 LIQD Take 1 Dose by mouth daily as needed (constipation)      Pregnenolone 50 MG TABS Take 100 mg by mouth every morning      Zinc Acetate, Oral, (ZINC ACETATE PO) Take 30 mg by mouth daily      vitamin D (CHOLECALCIFEROL) 25 MCG (1000 UT) TABS tablet Take 2 tablets by mouth daily Takes 2-3 tabs      cyanocobalamin 500 MCG tablet Take 1 tablet by mouth daily      diphenhydrAMINE (BENADRYL) 25 MG capsule Take 1 capsule by mouth every 6 hours as needed for Allergies      fexofenadine (ALLEGRA) 180 MG tablet Take 1 tablet by mouth daily      ketoconazole (NIZORAL) 2 % shampoo Use PRN      melatonin 3 MG TABS tablet Take 1 tablet by mouth nightly as needed (sleep) Takes 2 tabs      vitamin

## 2024-07-26 NOTE — RESULT ENCOUNTER NOTE
Please notify patient that her lab work looked good and that I continue to recommend compression stockings and elevating her feet when possible. She may also benefit from taking the meloxicam (mobic) daily for the next 2 weeks. I am willing to call in a diuretic if she would like to see if a low dose would help reduce the edema.

## 2024-07-31 RX ORDER — FUROSEMIDE 20 MG/1
TABLET ORAL
Qty: 30 TABLET | Refills: 0 | Status: SHIPPED | OUTPATIENT
Start: 2024-07-31

## 2024-08-05 ENCOUNTER — TELEPHONE (OUTPATIENT)
Dept: FAMILY MEDICINE CLINIC | Facility: CLINIC | Age: 79
End: 2024-08-05

## 2024-08-05 ENCOUNTER — HOSPITAL ENCOUNTER (OUTPATIENT)
Dept: PHYSICAL THERAPY | Age: 79
Setting detail: RECURRING SERIES
Discharge: HOME | End: 2024-08-08
Payer: MEDICARE

## 2024-08-05 DIAGNOSIS — R26.89 BALANCE PROBLEM: ICD-10-CM

## 2024-08-05 DIAGNOSIS — M16.0 BILATERAL PRIMARY OSTEOARTHRITIS OF HIP: Primary | ICD-10-CM

## 2024-08-05 DIAGNOSIS — R60.0 LOCALIZED EDEMA: ICD-10-CM

## 2024-08-05 DIAGNOSIS — M62.81 MUSCLE WEAKNESS (GENERALIZED): ICD-10-CM

## 2024-08-05 DIAGNOSIS — R60.0 EDEMA OF BOTH FEET: Primary | ICD-10-CM

## 2024-08-05 PROCEDURE — 97110 THERAPEUTIC EXERCISES: CPT

## 2024-08-05 PROCEDURE — 97162 PT EVAL MOD COMPLEX 30 MIN: CPT

## 2024-08-05 NOTE — THERAPY EVALUATION
above will be carried out by a therapist or under their direction.  Thank you for this referral,  DELMA MOELLER, [facility]     Referring Physician Signature: Walter Rojas MD                    Charge Capture  Events  Appt Desk  Attendance Report

## 2024-08-05 NOTE — TELEPHONE ENCOUNTER
Patient has called in asking that the provider write an order for edema above legs for [facility]

## 2024-08-06 NOTE — PROGRESS NOTES
Meredith KIANNA Malu  : 1945  Primary: Medicare Part A And B (Medicare)  Secondary:  [facility] @ [address] [place] [address]  Phone: [phone]  Fax: [phone] Plan Frequency: 2 x per week until reassessment, discharge, or goals met    Plan of Care/Certification Expiration Date: 24        Plan of Care/Certification Expiration Date:  Plan of Care/Certification Expiration Date: 24    Frequency/Duration:   Plan Frequency: 2 x per week until reassessment, discharge, or goals met      Time In/Out:   Time In: 1430  Time Out: 1515      PT Visit Info:    Total # of Visits Approved: 999  Total # of Visits to Date: 1  Progress Note Counter: 1      Visit Count:  1    OUTPATIENT PHYSICAL THERAPY:   Treatment Note 2024       Episode  (hip pain and swelling)               Treatment Diagnosis:    Bilateral primary osteoarthritis of hip  Muscle weakness (generalized)  Balance problem  Medical/Referring Diagnosis:    Unilateral primary osteoarthritis, right hip  Unilateral primary osteoarthritis, left hip      Referring Physician:  Walter Rojas MD MD Orders:  PT Eval and Treat   Return MD Appt:    Future Appointments   Date Time Provider Department Center   2024  1:30 PM SFE US LOGIC 7 SFERUS SFE   2024  2:30 PM Gale Bush PTA SFOST SFO   2024 11:00 AM Gale Bush PTA SFOST SFO   2024  1:30 PM [facility] ECHO 63 UCDG GVL AMB   2024  2:00 PM Sea Martinez MD upobgyn GVL AMB   2024  3:00 PM Elmer Perkins MD [facility] ECC DEP   2024  1:00 PM Elmer Perkins MD [facility] ECC DEP       Date of Onset:  Onset Date:  (chronic)     Allergies:   Latex, Articaine, Donepezil, Epinephrine, Erythromycin, Lexapro [escitalopram], Penicillins, Cephalexin, Chlorzoxazone, Clindamycin, Gabapentin, Tetracyclines & related, and Tramadol  Restrictions/Precautions:   None      Interventions

## 2024-08-06 NOTE — TELEPHONE ENCOUNTER
Please schedule patient with [facility] for Aquatic therapy at [address] [place] [address] A, [place], [place] [address]

## 2024-08-08 DIAGNOSIS — F31.70 BIPOLAR AFFECTIVE DISORDER IN REMISSION (HCC): ICD-10-CM

## 2024-08-08 RX ORDER — OLANZAPINE 5 MG/1
TABLET ORAL
Qty: 180 TABLET | Refills: 0 | OUTPATIENT
Start: 2024-08-08

## 2024-08-08 NOTE — TELEPHONE ENCOUNTER
Patient called in requesting a medication refill of olanzapine 5 mg    LDOS: last visit w/ Sandee 05/23/24  Next appt: 08/30/24    [facility] - [place], [place] - [address] - P [phone] - F [phone]  [facility] [address] [facility] [address]  Phone: [phone]  Fax: [phone]

## 2024-08-12 ENCOUNTER — HOSPITAL ENCOUNTER (OUTPATIENT)
Dept: ULTRASOUND IMAGING | Age: 79
Discharge: HOME | End: 2024-08-15
Payer: MEDICARE

## 2024-08-12 DIAGNOSIS — E04.2 MULTINODULAR GOITER: ICD-10-CM

## 2024-08-12 PROCEDURE — 76536 US EXAM OF HEAD AND NECK: CPT

## 2024-08-12 NOTE — TELEPHONE ENCOUNTER
Patient has called back requesting the refill. Patient states that she spoke with you in regard to the refill. Would this be able to be sent in today.

## 2024-08-13 RX ORDER — OLANZAPINE 5 MG/1
5 TABLET ORAL DAILY
Qty: 120 TABLET | Refills: 0 | Status: SHIPPED | OUTPATIENT
Start: 2024-08-13

## 2024-08-13 NOTE — TELEPHONE ENCOUNTER
Pt is calling back again regarding this medication. She is requesting Olanzapine 5 mg tablets. It was discussed that she would receive a refill at her last visit with [facility], but it was not sent. Pt is voicing frustration.     5 mg morning    15 mg at night    [facility]

## 2024-08-14 ENCOUNTER — HOSPITAL ENCOUNTER (OUTPATIENT)
Dept: PHYSICAL THERAPY | Age: 79
Setting detail: RECURRING SERIES
Discharge: HOME | End: 2024-08-17
Payer: MEDICARE

## 2024-08-14 PROCEDURE — 97113 AQUATIC THERAPY/EXERCISES: CPT

## 2024-08-14 NOTE — PROGRESS NOTES
Meredith KIANNA Malu  : 1945  Primary: Medicare Part A And B (Medicare)  Secondary:  [facility] @ [address] [place] [address]  Phone: [phone]  Fax: [phone] Plan Frequency: 2 x per week until reassessment, discharge, or goals met    Plan of Care/Certification Expiration Date: 24        Plan of Care/Certification Expiration Date:  Plan of Care/Certification Expiration Date: 24    Frequency/Duration:   Plan Frequency: 2 x per week until reassessment, discharge, or goals met      Time In/Out:   Time In: 230  Time Out: 315      PT Visit Info:    Total # of Visits Approved: 999  Total # of Visits to Date: 1  Progress Note Counter: 1      Visit Count:  2    OUTPATIENT PHYSICAL THERAPY:   Treatment Note 2024       Episode  (hip pain and swelling)               Treatment Diagnosis:    No data found  Medical/Referring Diagnosis:    Unilateral primary osteoarthritis, right hip  Unilateral primary osteoarthritis, left hip      Referring Physician:  Walter Rojas MD MD Orders:  PT Eval and Treat   Return MD Appt:    Future Appointments   Date Time Provider Department Center   2024 11:00 AM Gale Bush, PTA SFOST SFO   2024  1:30 PM [facility] ECHO 63 UCDG GVL AMB   2024  2:00 PM Sea Martinez MD upvarun GVL AMB   2024  3:00 PM Elmer Perkins MD [facility] ECC DEP   2024  1:00 PM Elmer Perkins MD [facility] ECC DEP       Date of Onset:  Onset Date:  (chronic)     Allergies:   Latex, Articaine, Donepezil, Epinephrine, Erythromycin, Lexapro [escitalopram], Penicillins, Cephalexin, Chlorzoxazone, Clindamycin, Gabapentin, Tetracyclines & related, and Tramadol  Restrictions/Precautions:   None      Interventions Planned (Treatment may consist of any combination of the following):     See Assessment Note    Subjective Comments: Patient reports right hip has really been hurting for last few

## 2024-08-16 ENCOUNTER — APPOINTMENT (OUTPATIENT)
Dept: PHYSICAL THERAPY | Age: 79
End: 2024-08-16
Payer: MEDICARE

## 2024-08-19 ENCOUNTER — HOSPITAL ENCOUNTER (OUTPATIENT)
Dept: PHYSICAL THERAPY | Age: 79
Setting detail: RECURRING SERIES
Discharge: HOME | End: 2024-08-22
Payer: MEDICARE

## 2024-08-19 PROCEDURE — 97113 AQUATIC THERAPY/EXERCISES: CPT

## 2024-08-19 NOTE — PROGRESS NOTES
Meredith KIANNA Malu  : 1945  Primary: Medicare Part A And B (Medicare)  Secondary:  [facility] [place]  [address] [facility] [place] [address]  Phone: [phone]  Fax: [phone] Plan Frequency: 2 x per week until reassessment, discharge, or goals met    Plan of Care/Certification Expiration Date: 24        Plan of Care/Certification Expiration Date:  Plan of Care/Certification Expiration Date: 24    Frequency/Duration:   Plan Frequency: 2 x per week until reassessment, discharge, or goals met      Time In/Out:   Time In: 1515  Time Out: 1600      PT Visit Info:    Total # of Visits Approved: 999  Total # of Visits to Date: 3  Progress Note Counter: 3      Visit Count:  3    OUTPATIENT PHYSICAL THERAPY:   Treatment Note 2024       Episode  (hip pain and swelling)   Secondary to peripheral neuropathy false precaution in pool will need to hand on to side or rail with close PT supervision               Treatment Diagnosis:    No data found  Medical/Referring Diagnosis:    Edema of both feet  Localized edema      Referring Physician:  Walter Rojas MD MD Orders:  PT Eval and Treat   Return MD Appt:    Future Appointments   Date Time Provider Department Center   2024 10:15 AM Gale Bush PTA SFOST SFO   2024  1:30 PM [facility] ECHO 63 UCDG GVL AMB   2024  1:00 PM Diego Pineda, PT SFOST SFO   2024  2:00 PM Sea Martinez MD upobjohn GVL AMB   2024  1:45 PM Diego Pineda, PT SFOST SFO   2024  3:00 PM Elmer Perkins MD [facility] ECC DEP   2024  1:00 PM Gale Bush, PTA SFOST SFO   2024 12:30 PM Gale Bush, PTA SFOST SFO   2024  1:00 PM Elmer Perkins MD [facility] ECC DEP       Date of Onset:  Onset Date:  (chronic)     Allergies:   Latex, Articaine, Donepezil, Epinephrine, Erythromycin, Lexapro [escitalopram], Penicillins, Cephalexin, Chlorzoxazone,

## 2024-08-21 ENCOUNTER — HOSPITAL ENCOUNTER (OUTPATIENT)
Dept: PHYSICAL THERAPY | Age: 79
Setting detail: RECURRING SERIES
Discharge: HOME | End: 2024-08-24
Payer: MEDICARE

## 2024-08-21 PROCEDURE — 97113 AQUATIC THERAPY/EXERCISES: CPT

## 2024-08-21 NOTE — PROGRESS NOTES
Meredith Toribio  : 1945  Primary: Medicare Part A And B (Medicare)  Secondary:  [facility] @ [address]  [place] [address]  Phone: [phone]  Fax: [phone] Plan Frequency: 2 x per week until reassessment, discharge, or goals met    Plan of Care/Certification Expiration Date: 24        Plan of Care/Certification Expiration Date:  Plan of Care/Certification Expiration Date: 24    Frequency/Duration:   Plan Frequency: 2 x per week until reassessment, discharge, or goals met      Time In/Out:   Time In: 1015  Time Out: 1100      PT Visit Info:    Total # of Visits Approved: 999  Total # of Visits to Date: 3  Progress Note Counter: 3      Visit Count:  4    OUTPATIENT PHYSICAL THERAPY:   Treatment Note 2024       Episode  (hip pain and swelling)   Secondary to peripheral neuropathy false precaution in pool will need to hand on to side or rail with close PT supervision               Treatment Diagnosis:    No data found  Medical/Referring Diagnosis:    Unilateral primary osteoarthritis, right hip  Unilateral primary osteoarthritis, left hip      Referring Physician:  Walter Rojas MD MD Orders:  PT Eval and Treat   Return MD Appt:    Future Appointments   Date Time Provider Department Center   2024  1:30 PM [facility] ECHO 63 UCDG GVL AMB   2024  1:00 PM Diego Pineda, PT SFOST SFO   2024  2:00 PM Sea Martinez MD upvarun GVL AMB   2024  1:45 PM Diego Pineda, PT SFOST SFO   2024  3:00 PM Elmer Perkins MD [facility] ECC DEP   2024  1:00 PM Gale Bush, PTA SFOST SFO   2024 12:30 PM Gale Bush, PTA SFOST SFO   2024  1:00 PM Elmer Perkins MD [facility] ECC DEP       Date of Onset:  Onset Date:  (chronic)     Allergies:   Latex, Articaine, Donepezil, Epinephrine, Erythromycin, Lexapro [escitalopram], Penicillins, Cephalexin, Chlorzoxazone,

## 2024-08-26 NOTE — PROGRESS NOTES
HPI    Meredith Toribio is a 78 y.o. female seen for annual GYN exam.    Past Medical History, Past Surgical History, Family history, Social History, and Medications were all reviewed with the patient today and updated as necessary.     Current Outpatient Medications   Medication Sig    OLANZapine (ZYPREXA) 5 MG tablet Take 1 tablet by mouth daily Take 5 mg ( one tablet) in the am and 15 mg (3 tablets) at night.    metoprolol tartrate (LOPRESSOR) 25 MG tablet Take 1 tablet by mouth 2 times daily    meloxicam (MOBIC) 7.5 MG tablet Take 1 tablet by mouth daily    albuterol sulfate HFA (PROVENTIL;VENTOLIN;PROAIR) 108 (90 Base) MCG/ACT inhaler Inhale 2 puffs into the lungs every 4 hours as needed for Wheezing    NONFORMULARY Take 2 capsules by mouth daily. PQQ (pyrroloquinoline quinone disodium salt) 2 capsules by mouth daily    acetaminophen (TYLENOL) 500 MG tablet Take 1 tablet by mouth every 6 hours as needed for Pain    Magnesium 500 MG TABS Take 500 mg by mouth daily.    VITAMIN A PO Take 10,000 Units by mouth daily.    Bioflavonoid Products (FARIAD C PO) Take 1 tablet by mouth daily.    NONFORMULARY Take 500 mg by mouth daily. Epicor. Takes 2-3 tabs.    NONFORMULARY Take 1 tablet by mouth daily. ATP Co-factor.    DHEA 50 MG CAPS Take 100 mg by mouth every morning    Coenzyme Q10 (COQ10 PO) Take 1 tablet by mouth 2 times daily    CPAP Machine MISC by Other route    Lactobacillus Acidophilus POWD Take 2 capsules by mouth daily    Pregnenolone 50 MG TABS Take 100 mg by mouth every morning    vitamin D (CHOLECALCIFEROL) 25 MCG (1000 UT) TABS tablet Take 2 tablets by mouth daily Takes 2-3 tabs    cyanocobalamin 500 MCG tablet Take 1 tablet by mouth daily    diphenhydrAMINE (BENADRYL) 25 MG capsule Take 1 capsule by mouth every 6 hours as needed for Allergies    fexofenadine (ALLEGRA) 180 MG tablet Take 1 tablet by mouth daily    melatonin 3 MG TABS tablet Take 1 tablet by mouth nightly as needed (sleep) Takes 2 tabs     History:   Diagnosis Date    Arthritis     Right Hip    Multinodular goiter 2016     Past Surgical History:   Procedure Laterality Date    BREAST BIOPSY  2013    BREAST NEEDLE LOCALIZED BIOPSY [place] [facility]    BREAST LUMPECTOMY  2013    BREAST LUMPECTOMY/PARTIAL performed by [place]el [place] [facility]    CHOLECYSTECTOMY      GYN      ovarian cysts, L ovary at 19 years old    HEENT      Lens replaced Right eye    HEENT      sinus    HEENT      tonsillectomy    OTHER SURGICAL HISTORY      skin cancer removed from face     Family History   Problem Relation Age of Onset    Cancer Mother         Uterine      Social History     Tobacco Use    Smoking status: Former     Current packs/day: 0.00     Average packs/day: 2.0 packs/day for 15.0 years (30.0 ttl pk-yrs)     Types: Cigarettes     Start date: 1996     Quit date: 2011     Years since quittin.6    Smokeless tobacco: Never   Substance Use Topics    Alcohol use: No       Social History     Substance and Sexual Activity   Sexual Activity Not Currently     OB History    Para Term  AB Living   0 0 0 0 0 0   SAB IAB Ectopic Molar Multiple Live Births   0 0 0 0 0 0       Health Maintenance  Mammogram: 24 Advised annually  Colonoscopy:   Bone Density:  Pap smear: 2023      Review of Systems  General: Not Present- Fatigue, Insomnia, Hot flashes/Night sweats, Weight gain, Brain fog  Breast: Not Present- Breast Mass, Breast Pain, Breast Swelling, Nipple Discharge, Nipple Pain, Recent Breast Size Changes and Skin Changes.  Gastrointestinal: Not Present- Abdominal Pain,  Bloating, Constipation, Diarrhea, Nausea, Rectal bleeding  Female Genitourinary: Not Present- Dysmenorrhea, Dyspareunia, Decreased libido, Excessive Menstrual Bleeding, Menstrual Irregularities, Pelvic Pain, Urinary Complaints, Vaginal Discharge, Vaginal itching/burning, Vaginal odor, Vaginal dryness  Psychiatric: Not

## 2024-08-27 ENCOUNTER — HOSPITAL ENCOUNTER (OUTPATIENT)
Dept: PHYSICAL THERAPY | Age: 79
Setting detail: RECURRING SERIES
Discharge: HOME | End: 2024-08-30
Payer: MEDICARE

## 2024-08-27 PROCEDURE — 97110 THERAPEUTIC EXERCISES: CPT

## 2024-08-27 PROCEDURE — 97113 AQUATIC THERAPY/EXERCISES: CPT

## 2024-08-28 ENCOUNTER — OFFICE VISIT (OUTPATIENT)
Dept: OBGYN CLINIC | Age: 79
End: 2024-08-28
Payer: MEDICARE

## 2024-08-28 ENCOUNTER — TELEPHONE (OUTPATIENT)
Dept: FAMILY MEDICINE CLINIC | Facility: CLINIC | Age: 79
End: 2024-08-28

## 2024-08-28 VITALS
BODY MASS INDEX: 27.88 KG/M2 | WEIGHT: 142 LBS | SYSTOLIC BLOOD PRESSURE: 124 MMHG | DIASTOLIC BLOOD PRESSURE: 80 MMHG | HEIGHT: 60 IN

## 2024-08-28 DIAGNOSIS — F31.70 BIPOLAR AFFECTIVE DISORDER IN REMISSION (HCC): Primary | ICD-10-CM

## 2024-08-28 DIAGNOSIS — F41.1 GAD (GENERALIZED ANXIETY DISORDER): ICD-10-CM

## 2024-08-28 DIAGNOSIS — Z01.419 WELL WOMAN EXAM: Primary | ICD-10-CM

## 2024-08-28 DIAGNOSIS — F22 PARANOID BEHAVIOR (HCC): ICD-10-CM

## 2024-08-28 DIAGNOSIS — Z12.31 VISIT FOR SCREENING MAMMOGRAM: ICD-10-CM

## 2024-08-28 DIAGNOSIS — Z12.4 SCREENING FOR MALIGNANT NEOPLASM OF CERVIX: ICD-10-CM

## 2024-08-28 PROCEDURE — 3079F DIAST BP 80-89 MM HG: CPT | Performed by: OBSTETRICS & GYNECOLOGY

## 2024-08-28 PROCEDURE — G0101 CA SCREEN;PELVIC/BREAST EXAM: HCPCS | Performed by: OBSTETRICS & GYNECOLOGY

## 2024-08-28 PROCEDURE — 3074F SYST BP LT 130 MM HG: CPT | Performed by: OBSTETRICS & GYNECOLOGY

## 2024-08-28 NOTE — TELEPHONE ENCOUNTER
The patient stated she was deciding what psychiatrist  she needs to go to.  She would like to go to Dr. Sinah, but needs a referral.     She states that Dr. Perkins cannot write the order because she is part time, Sandee Haynes NP has to write the referral.         Per Dr. Sinha's office part time providers can write referrals, but Dr. Perkins in not creditied with Medicare and therefore is unable to write the order.

## 2024-08-29 ENCOUNTER — APPOINTMENT (OUTPATIENT)
Dept: PHYSICAL THERAPY | Age: 79
End: 2024-08-29
Payer: MEDICARE

## 2024-08-30 ENCOUNTER — OFFICE VISIT (OUTPATIENT)
Dept: FAMILY MEDICINE CLINIC | Facility: CLINIC | Age: 79
End: 2024-08-30

## 2024-08-30 VITALS
OXYGEN SATURATION: 97 % | BODY MASS INDEX: 28.39 KG/M2 | DIASTOLIC BLOOD PRESSURE: 80 MMHG | WEIGHT: 144.6 LBS | SYSTOLIC BLOOD PRESSURE: 155 MMHG | RESPIRATION RATE: 14 BRPM | HEIGHT: 60 IN | HEART RATE: 85 BPM | TEMPERATURE: 97.1 F

## 2024-08-30 DIAGNOSIS — M19.90 ARTHRITIS: ICD-10-CM

## 2024-08-30 DIAGNOSIS — R42 DIZZINESS: ICD-10-CM

## 2024-08-30 DIAGNOSIS — M54.50 CHRONIC LOW BACK PAIN, UNSPECIFIED BACK PAIN LATERALITY, UNSPECIFIED WHETHER SCIATICA PRESENT: ICD-10-CM

## 2024-08-30 DIAGNOSIS — Z79.899 LONG TERM CURRENT USE OF ANTIPSYCHOTIC MEDICATION: ICD-10-CM

## 2024-08-30 DIAGNOSIS — I10 ESSENTIAL (PRIMARY) HYPERTENSION: ICD-10-CM

## 2024-08-30 DIAGNOSIS — G89.29 CHRONIC LOW BACK PAIN, UNSPECIFIED BACK PAIN LATERALITY, UNSPECIFIED WHETHER SCIATICA PRESENT: ICD-10-CM

## 2024-08-30 DIAGNOSIS — F41.1 GAD (GENERALIZED ANXIETY DISORDER): ICD-10-CM

## 2024-08-30 DIAGNOSIS — F22 PARANOID BEHAVIOR (HCC): Primary | ICD-10-CM

## 2024-08-30 DIAGNOSIS — F31.70 BIPOLAR AFFECTIVE DISORDER IN REMISSION (HCC): ICD-10-CM

## 2024-08-30 RX ORDER — METOPROLOL TARTRATE 25 MG/1
25 TABLET, FILM COATED ORAL 2 TIMES DAILY
Qty: 180 TABLET | Refills: 0 | Status: SHIPPED | OUTPATIENT
Start: 2024-08-30

## 2024-08-30 RX ORDER — MECLIZINE HCL 12.5 MG 12.5 MG/1
12.5 TABLET ORAL 3 TIMES DAILY PRN
Qty: 30 TABLET | Refills: 0 | Status: SHIPPED | OUTPATIENT
Start: 2024-08-30 | End: 2024-09-09

## 2024-08-30 RX ORDER — OLANZAPINE 5 MG/1
TABLET ORAL
Qty: 120 TABLET | Refills: 1 | Status: SHIPPED | OUTPATIENT
Start: 2024-08-30

## 2024-08-30 RX ORDER — OLANZAPINE 5 MG/1
5 TABLET ORAL DAILY
Qty: 120 TABLET | Refills: 0 | Status: SHIPPED | OUTPATIENT
Start: 2024-08-30 | End: 2024-08-30

## 2024-08-30 ASSESSMENT — PATIENT HEALTH QUESTIONNAIRE - PHQ9
SUM OF ALL RESPONSES TO PHQ QUESTIONS 1-9: 15
4. FEELING TIRED OR HAVING LITTLE ENERGY: MORE THAN HALF THE DAYS
SUM OF ALL RESPONSES TO PHQ QUESTIONS 1-9: 15
SUM OF ALL RESPONSES TO PHQ QUESTIONS 1-9: 15
3. TROUBLE FALLING OR STAYING ASLEEP: MORE THAN HALF THE DAYS
SUM OF ALL RESPONSES TO PHQ QUESTIONS 1-9: 13
8. MOVING OR SPEAKING SO SLOWLY THAT OTHER PEOPLE COULD HAVE NOTICED. OR THE OPPOSITE, BEING SO FIGETY OR RESTLESS THAT YOU HAVE BEEN MOVING AROUND A LOT MORE THAN USUAL: SEVERAL DAYS
SUM OF ALL RESPONSES TO PHQ9 QUESTIONS 1 & 2: 4
5. POOR APPETITE OR OVEREATING: MORE THAN HALF THE DAYS
6. FEELING BAD ABOUT YOURSELF - OR THAT YOU ARE A FAILURE OR HAVE LET YOURSELF OR YOUR FAMILY DOWN: NOT AT ALL
10. IF YOU CHECKED OFF ANY PROBLEMS, HOW DIFFICULT HAVE THESE PROBLEMS MADE IT FOR YOU TO DO YOUR WORK, TAKE CARE OF THINGS AT HOME, OR GET ALONG WITH OTHER PEOPLE: SOMEWHAT DIFFICULT
7. TROUBLE CONCENTRATING ON THINGS, SUCH AS READING THE NEWSPAPER OR WATCHING TELEVISION: MORE THAN HALF THE DAYS
1. LITTLE INTEREST OR PLEASURE IN DOING THINGS: NEARLY EVERY DAY
9. THOUGHTS THAT YOU WOULD BE BETTER OFF DEAD, OR OF HURTING YOURSELF: MORE THAN HALF THE DAYS
2. FEELING DOWN, DEPRESSED OR HOPELESS: SEVERAL DAYS

## 2024-08-30 ASSESSMENT — COLUMBIA-SUICIDE SEVERITY RATING SCALE - C-SSRS
2. HAVE YOU ACTUALLY HAD ANY THOUGHTS OF KILLING YOURSELF?: NO
1. WITHIN THE PAST MONTH, HAVE YOU WISHED YOU WERE DEAD OR WISHED YOU COULD GO TO SLEEP AND NOT WAKE UP?: NO
6. HAVE YOU EVER DONE ANYTHING, STARTED TO DO ANYTHING, OR PREPARED TO DO ANYTHING TO END YOUR LIFE?: NO

## 2024-08-30 NOTE — PROGRESS NOTES
Elmer Perkins MD  [facility]  [address] [place] [address]  [phone]    HPI: Meredith Toribio (: 1945) is a 78 y.o. female here for evaluation of the following chief complaint(s): Follow-up (1 mo /C/o complain left hip pain pelvis out of alignment/Requesting referral psychologist)     Thyroid scan done and looks good. - needs checked every 2 years.  No heart issues seen with echo.    Prays all the time to [facility] for hope.    Is doing water aerobics. Chiropractor did pelvic alignment. Usually it is the right hip that hurts, left hip was extra sore. Asking about bed because head of the bed is lower than foot.  Woke up on couch and didn't remember moving there. Has fallen asleep sitting up in chair. Will get a headache. Just falls asleep suddenly. Says she does not take benadryl often. And is forgetting the melatonin when she falls asleep abruptly.    Saw Dr. Finney 3-4 times and was tried on risperidal rather than olanzipine. But it gave her a terrible headache. Stopped olanzapine cold turkey to start tiny dose of risperidal. Went to ER.    She has occasional episodes of dizziness / room spining. She previously took meclizine which seemed to help.    Allergies:   Allergies   Allergen Reactions    Latex Itching    Articaine Other (See Comments)    Donepezil Other (See Comments)     Dizzy,weak and vomiting     Epinephrine Swelling     Tongue swelling     Erythromycin Other (See Comments)     GI upset    Lexapro [Escitalopram]      Drowsy, nauseated, diarrhea,     Penicillins Itching     rash      Cephalexin Rash    Chlorzoxazone Rash and Other (See Comments)    Clindamycin Nausea And Vomiting    Gabapentin Rash     Rx'd by psychiatry. Tongue swelling    Tetracyclines & Related      Other Reaction(s): Other (comments), Other (see comments)      uncertain    Tramadol Rash       Current Outpatient Medications   Medication Sig Dispense Refill    OLANZapine (ZYPREXA) 5 MG tablet  to Psychiatry  2. Bipolar affective disorder in remission (HCC)  -     External Referral to Psychiatry  -     OLANZapine (ZYPREXA) 5 MG tablet; Take 1 tablet by mouth every morning AND 3 tablets at bedtime. Take 5 mg ( one tablet) in the am and 15 mg (3 tablets) at night.., Disp-120 tablet, R-1Normal  3. Essential (primary) hypertension  -     metoprolol tartrate (LOPRESSOR) 25 MG tablet; Take 1 tablet by mouth 2 times daily, Disp-180 tablet, R-0Normal  4. Chronic low back pain, unspecified back pain laterality, unspecified whether sciatica present  5. LING (generalized anxiety disorder)  6. Long term current use of antipsychotic medication  7. Arthritis  8. Dizziness  -     meclizine (ANTIVERT) 12.5 MG tablet; Take 1 tablet by mouth 3 times daily as needed for Dizziness, Disp-30 tablet, R-0Normal        Return in about 2 months (around 10/30/2024) for recheck, blood pressure, medication refills.     On 08/30/24 I have spent 45 minutes reviewing previous notes, test results and face to face with the patient (and any present family or support) discussing the diagnosis and plan.    MEMDNR: Elmer Perkins MD   An electronic signature was used to authenticate this note.    Elements of this note may have been dictated via voice recognition software. Text and phrases may be limited by the accuracy and autoconversion of the software. The chart has been reviewed, but errors may still be present.

## 2024-09-01 LAB
COLLECTION METHOD: NORMAL
CYTOLOGIST CVX/VAG CYTO: NORMAL
CYTOLOGY CVX/VAG DOC THIN PREP: NORMAL
HPV REFLEX: NORMAL
Lab: NORMAL
PAP SOURCE: NORMAL
PATH REPORT.FINAL DX SPEC: NORMAL
STAT OF ADQ CVX/VAG CYTO-IMP: NORMAL

## 2024-09-04 ENCOUNTER — APPOINTMENT (OUTPATIENT)
Dept: PHYSICAL THERAPY | Age: 79
End: 2024-09-04
Payer: MEDICARE

## 2024-09-06 ENCOUNTER — APPOINTMENT (OUTPATIENT)
Dept: PHYSICAL THERAPY | Age: 79
End: 2024-09-06
Payer: MEDICARE

## 2024-09-16 ENCOUNTER — HOSPITAL ENCOUNTER (OUTPATIENT)
Dept: PHYSICAL THERAPY | Age: 79
Setting detail: RECURRING SERIES
Discharge: HOME | End: 2024-09-19
Payer: MEDICARE

## 2024-09-16 PROCEDURE — 97113 AQUATIC THERAPY/EXERCISES: CPT

## 2024-09-17 ENCOUNTER — APPOINTMENT (OUTPATIENT)
Dept: PHYSICAL THERAPY | Age: 79
End: 2024-09-17
Payer: MEDICARE

## 2024-09-19 ENCOUNTER — APPOINTMENT (OUTPATIENT)
Dept: PHYSICAL THERAPY | Age: 79
End: 2024-09-19
Payer: MEDICARE

## 2024-09-23 ENCOUNTER — APPOINTMENT (OUTPATIENT)
Dept: PHYSICAL THERAPY | Age: 79
End: 2024-09-23
Payer: MEDICARE

## 2024-09-23 ENCOUNTER — TELEPHONE (OUTPATIENT)
Dept: FAMILY MEDICINE CLINIC | Facility: CLINIC | Age: 79
End: 2024-09-23

## 2024-09-26 ENCOUNTER — TELEPHONE (OUTPATIENT)
Dept: BEHAVIORAL/MENTAL HEALTH CLINIC | Age: 79
End: 2024-09-26

## 2024-09-26 ENCOUNTER — HOSPITAL ENCOUNTER (OUTPATIENT)
Dept: PHYSICAL THERAPY | Age: 79
Setting detail: RECURRING SERIES
End: 2024-09-26
Payer: MEDICARE

## 2024-09-26 ENCOUNTER — OFFICE VISIT (OUTPATIENT)
Dept: BEHAVIORAL/MENTAL HEALTH CLINIC | Age: 79
End: 2024-09-26
Payer: MEDICARE

## 2024-09-26 ENCOUNTER — TELEPHONE (OUTPATIENT)
Dept: FAMILY MEDICINE CLINIC | Facility: CLINIC | Age: 79
End: 2024-09-26

## 2024-09-26 VITALS
WEIGHT: 144 LBS | DIASTOLIC BLOOD PRESSURE: 84 MMHG | TEMPERATURE: 98.6 F | HEIGHT: 60 IN | OXYGEN SATURATION: 98 % | SYSTOLIC BLOOD PRESSURE: 136 MMHG | HEART RATE: 97 BPM | BODY MASS INDEX: 28.27 KG/M2

## 2024-09-26 DIAGNOSIS — F41.1 GENERALIZED ANXIETY DISORDER: ICD-10-CM

## 2024-09-26 DIAGNOSIS — F43.10 PTSD (POST-TRAUMATIC STRESS DISORDER): ICD-10-CM

## 2024-09-26 DIAGNOSIS — F31.75 BIPOLAR DISORDER, IN PARTIAL REMISSION, MOST RECENT EPISODE DEPRESSED (HCC): Primary | ICD-10-CM

## 2024-09-26 DIAGNOSIS — F51.05 INSOMNIA DUE TO MENTAL DISORDER: ICD-10-CM

## 2024-09-26 PROCEDURE — 90792 PSYCH DIAG EVAL W/MED SRVCS: CPT | Performed by: PSYCHIATRY & NEUROLOGY

## 2024-09-26 PROCEDURE — 1036F TOBACCO NON-USER: CPT | Performed by: PSYCHIATRY & NEUROLOGY

## 2024-09-26 RX ORDER — OLANZAPINE 5 MG/1
TABLET ORAL
Qty: 120 TABLET | Refills: 3 | Status: SHIPPED | OUTPATIENT
Start: 2024-09-26

## 2024-09-26 RX ORDER — LAMOTRIGINE 25 MG/1
TABLET ORAL
Qty: 60 TABLET | Refills: 3 | Status: SHIPPED | OUTPATIENT
Start: 2024-09-26

## 2024-09-26 ASSESSMENT — PATIENT HEALTH QUESTIONNAIRE - PHQ9
8. MOVING OR SPEAKING SO SLOWLY THAT OTHER PEOPLE COULD HAVE NOTICED. OR THE OPPOSITE, BEING SO FIGETY OR RESTLESS THAT YOU HAVE BEEN MOVING AROUND A LOT MORE THAN USUAL: SEVERAL DAYS
7. TROUBLE CONCENTRATING ON THINGS, SUCH AS READING THE NEWSPAPER OR WATCHING TELEVISION: SEVERAL DAYS
1. LITTLE INTEREST OR PLEASURE IN DOING THINGS: NOT AT ALL
SUM OF ALL RESPONSES TO PHQ QUESTIONS 1-9: 7
SUM OF ALL RESPONSES TO PHQ9 QUESTIONS 1 & 2: 0
6. FEELING BAD ABOUT YOURSELF - OR THAT YOU ARE A FAILURE OR HAVE LET YOURSELF OR YOUR FAMILY DOWN: SEVERAL DAYS
9. THOUGHTS THAT YOU WOULD BE BETTER OFF DEAD, OR OF HURTING YOURSELF: NOT AT ALL
4. FEELING TIRED OR HAVING LITTLE ENERGY: SEVERAL DAYS
5. POOR APPETITE OR OVEREATING: NOT AT ALL
SUM OF ALL RESPONSES TO PHQ QUESTIONS 1-9: 7
SUM OF ALL RESPONSES TO PHQ QUESTIONS 1-9: 7
10. IF YOU CHECKED OFF ANY PROBLEMS, HOW DIFFICULT HAVE THESE PROBLEMS MADE IT FOR YOU TO DO YOUR WORK, TAKE CARE OF THINGS AT HOME, OR GET ALONG WITH OTHER PEOPLE: VERY DIFFICULT
SUM OF ALL RESPONSES TO PHQ QUESTIONS 1-9: 7
2. FEELING DOWN, DEPRESSED OR HOPELESS: NOT AT ALL
3. TROUBLE FALLING OR STAYING ASLEEP: NEARLY EVERY DAY

## 2024-09-26 ASSESSMENT — ANXIETY QUESTIONNAIRES
5. BEING SO RESTLESS THAT IT IS HARD TO SIT STILL: NOT AT ALL
7. FEELING AFRAID AS IF SOMETHING AWFUL MIGHT HAPPEN: NEARLY EVERY DAY
4. TROUBLE RELAXING: SEVERAL DAYS
3. WORRYING TOO MUCH ABOUT DIFFERENT THINGS: NEARLY EVERY DAY
GAD7 TOTAL SCORE: 14
6. BECOMING EASILY ANNOYED OR IRRITABLE: NEARLY EVERY DAY
1. FEELING NERVOUS, ANXIOUS, OR ON EDGE: NEARLY EVERY DAY
2. NOT BEING ABLE TO STOP OR CONTROL WORRYING: SEVERAL DAYS
IF YOU CHECKED OFF ANY PROBLEMS ON THIS QUESTIONNAIRE, HOW DIFFICULT HAVE THESE PROBLEMS MADE IT FOR YOU TO DO YOUR WORK, TAKE CARE OF THINGS AT HOME, OR GET ALONG WITH OTHER PEOPLE: NOT DIFFICULT AT ALL

## 2024-09-26 ASSESSMENT — MINI MENTAL STATE EXAM
PLACE DESIGN, ERASER AND PENCIL IN FRONT OF THE PERSON.  SAY:  COPY THIS DESIGN PLEASE.  SHOW: DESIGN. ALLOW: MULTIPLE TRIES. WAIT UNTIL PERSON IS FINISHED AND HANDS IT BACK. SCORE: ONLY FOR DIAGRAM WITH 4-SIDED FIGURE BETWEEN TWO 5-SIDED FIGURES: 0
WHAT DAY OF THE WEEK IS THIS?: 1
SAY: I WOULD LIKE YOU TO COUNT BACKWARD FROM 100 BY SEVENS: 3
WHAT YEAR IS THIS?: 1
WHAT IS TODAY'S DATE?: 1
SAY: I WOULD LIKE YOU TO REPEAT THIS PHRASE AFTER ME: NO IFS, ANDS, OR BUTS.: 1
HAND THE PERSON A PENCIL AND PAPER. SAY: WRITE ANY COMPLETE SENTENCE ON THAT
PIECE OF PAPER. (NOTE: THE SENTENCE MUST MAKE SENSE. IGNORE SPELLING ERRORS): 1
SAY: I AM GOING TO NAME THREE OBJECTS. WHEN I AM FINISHED, I WANT YOU TO REPEAT
THEM. REMEMBER WHAT THEY ARE BECAUSE I AM GOING TO ASK YOU TO NAME THEM AGAIN IN
A FEW MINUTES.  SAY THE FOLLOWING WORDS SLOWLY AT 1-SECOND INTERVALS - BALL/ CAR/ MAN [ITERATIONS FOR REPEAT ADMINISTRATION]: 3
SAY: READ THE WORDS ON THE PAGE AND THEN DO WHAT IT SAYS. THEN HAND THE PERSON
THE SHEET WITH CLOSE YOUR EYES ON IT. IF THE SUBJECT READS AND DOES NOT CLOSE THEIR EYES, REPEAT UP TO THREE TIMES. SCORE ONLY IF SUBJECT CLOSES EYES.: 1
WHAT MONTH IS THIS?: 1
SAY: FOLD THE PAPER IN HALF ONCE WITH BOTH HANDS, SCORE IF PAPER IS CORRECTLY FOLDED IN HALF.: 1
NOW WHAT WERE THE THREE OBJECTS I ASKED YOU TO REMEMBER?: 2
WHAT FLOOR ARE WE ON [IN FACILITY]?/ WHAT ROOM ARE WE IN [IN HOME]?: 1
WHAT CITY/TOWN ARE WE IN?: 1
SUM ALL MMSE QUESTIONS FOR TOTAL SCORE [OUT OF 30].: 26
WHICH SEASON IS THIS?: 1
WHAT COUNTRY ARE WE IN?: 1
WHAT STATE [OR PROVINCE] ARE WE IN?: 1
SHOW: PENCIL [OBJECT] ASK: WHAT IS THIS CALLED?: 1
SHOW: WRISTWATCH [OBJECT] ASK: WHAT IS THIS CALLED?: 1
ASK THE PERSON IF HE IS RIGHT OR LEFT-HANDED. TAKE A PIECE OF PAPER AND HOLD IT UP IN
FRONT OF THE PERSON. SAY: TAKE THIS PAPER IN YOUR RIGHT/LEFT HAND (WHICHEVER IS NON-
DOMINANT), SCORE IF PAPER IS PICKED UP IN CORRECT HAND.: 1
WHAT IS THE NAME OF THIS BUILDING [IN FACILITY]?/WHAT IS THE STREET ADDRESS OF THIS HOUSE [IN HOME]?: 1
SAY: PUT THE PAPER DOWN ON THE FLOOR, SCORE IF PAPER IS PLACED BACK ON FLOOR: 1

## 2024-09-26 NOTE — TELEPHONE ENCOUNTER
Pt called to inform the provider that psych started her on lamotrigine. She also wanted to inform that her BP is currently 117/74 and her pulse is 106, but she has been running around. Pt has an appointment 9/27/24

## 2024-09-26 NOTE — PROGRESS NOTES
certain situational and personal stressors ongoing in her life at this time, weight management d/w the patient. Sleep hygiene d/w patient. Patient allowed to vent out her emotions.     []  Disposition planning      []  Dangerous and will not contract for safety in the community    **Pateint has been notified: They are to call 911 or go to their nearest E.R. if they are experiencing a medical emergency or suicidal ideations/homicidal ideations.**  All ancillary documentation entered reviewed by provider.      PLEASE NOTE:  This document has been produced in part or whole using voice recognition software. Proofread however unrecognized errors in transcription may be present.    Mary Sinha MD               Patient's brother is in the waiting room and would like to come back. Stated that patient does not remember things and he needs to be there to help with that. Patient would like to be seen by herself first.

## 2024-09-30 ENCOUNTER — HOSPITAL ENCOUNTER (OUTPATIENT)
Dept: PHYSICAL THERAPY | Age: 79
Setting detail: RECURRING SERIES
End: 2024-09-30
Payer: MEDICARE

## 2024-10-09 ENCOUNTER — TELEPHONE (OUTPATIENT)
Dept: BEHAVIORAL/MENTAL HEALTH CLINIC | Age: 79
End: 2024-10-09

## 2024-10-09 NOTE — TELEPHONE ENCOUNTER
Patient stated that she started Lamictal 25mg, stated that she started taking 50mg on 10/8/24, which was 2 days early to increase to 50mg. Patient stated that she was losing her balance and having difficulty standing even taking just the 25mg. Stated that today she fell backwards and broke a chair. Advised that she fell out of bed after starting the medication as well. Stated that she did not get hurt with any of the falls but does feel like this medication is too much for her. Feels like she just needs to stop taking the Lamictal all together since she was having side effects even with just the 25mg.     Please advise.    Scheduled sooner appt for 10/25 with Dr Sinha.

## 2024-10-10 ENCOUNTER — OFFICE VISIT (OUTPATIENT)
Dept: FAMILY MEDICINE CLINIC | Facility: CLINIC | Age: 79
End: 2024-10-10

## 2024-10-10 VITALS
TEMPERATURE: 98.1 F | HEIGHT: 60 IN | OXYGEN SATURATION: 97 % | BODY MASS INDEX: 28 KG/M2 | SYSTOLIC BLOOD PRESSURE: 148 MMHG | HEART RATE: 96 BPM | RESPIRATION RATE: 14 BRPM | WEIGHT: 142.6 LBS | DIASTOLIC BLOOD PRESSURE: 88 MMHG

## 2024-10-10 DIAGNOSIS — I10 ESSENTIAL (PRIMARY) HYPERTENSION: ICD-10-CM

## 2024-10-10 DIAGNOSIS — F31.70 BIPOLAR DISORDER IN PARTIAL REMISSION, MOST RECENT EPISODE UNSPECIFIED TYPE (HCC): ICD-10-CM

## 2024-10-10 DIAGNOSIS — R68.89 FORGETFULNESS: Primary | ICD-10-CM

## 2024-10-10 DIAGNOSIS — F03.B0 MODERATE DEMENTIA, UNSPECIFIED DEMENTIA TYPE, UNSPECIFIED WHETHER BEHAVIORAL, PSYCHOTIC, OR MOOD DISTURBANCE OR ANXIETY (HCC): ICD-10-CM
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
## 2024-10-17 ENCOUNTER — TELEPHONE (OUTPATIENT)
Dept: FAMILY MEDICINE CLINIC | Facility: CLINIC | Age: 79
End: 2024-10-17

## 2024-10-17 DIAGNOSIS — Z79.899 LONG TERM CURRENT USE OF ANTIPSYCHOTIC MEDICATION: Primary | ICD-10-CM

## 2024-10-17 DIAGNOSIS — R42 DIZZINESS: ICD-10-CM

## 2024-10-17 DIAGNOSIS — G31.84 MILD COGNITIVE IMPAIRMENT: ICD-10-CM

## 2024-10-17 DIAGNOSIS — F41.1 GAD (GENERALIZED ANXIETY DISORDER): ICD-10-CM

## 2024-10-17 NOTE — TELEPHONE ENCOUNTER
FYI:  BP was 156/106 Pulse 99 at 2:45 this afternoon. Feeling very wobbly but is being very careful.  Worn out and concerned about being alone.  Patient stated that she just wanted you to know.

## 2024-10-18 ENCOUNTER — CARE COORDINATION (OUTPATIENT)
Dept: CARE COORDINATION | Facility: CLINIC | Age: 79
End: 2024-10-18

## 2024-10-20 ENCOUNTER — HOSPITAL ENCOUNTER (EMERGENCY)
Age: 79
Discharge: HOME | End: 2024-10-20
Attending: EMERGENCY MEDICINE
Payer: MEDICARE

## 2024-10-20 ENCOUNTER — APPOINTMENT (OUTPATIENT)
Dept: CT IMAGING | Age: 79
End: 2024-10-20
Payer: MEDICARE

## 2024-10-20 ENCOUNTER — APPOINTMENT (OUTPATIENT)
Dept: GENERAL RADIOLOGY | Age: 79
End: 2024-10-20
Payer: MEDICARE

## 2024-10-20 VITALS
HEART RATE: 69 BPM | SYSTOLIC BLOOD PRESSURE: 134 MMHG | WEIGHT: 144 LBS | BODY MASS INDEX: 29.03 KG/M2 | DIASTOLIC BLOOD PRESSURE: 72 MMHG | OXYGEN SATURATION: 95 % | HEIGHT: 59 IN | TEMPERATURE: 97.8 F | RESPIRATION RATE: 23 BRPM

## 2024-10-20 DIAGNOSIS — G89.29 CHRONIC BACK PAIN, UNSPECIFIED BACK LOCATION, UNSPECIFIED BACK PAIN LATERALITY: ICD-10-CM
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
## 2024-10-20 DIAGNOSIS — M54.9 CHRONIC BACK PAIN, UNSPECIFIED BACK LOCATION, UNSPECIFIED BACK PAIN LATERALITY: ICD-10-CM

## 2024-10-20 LAB
ANION GAP SERPL CALC-SCNC: 10 MMOL/L (ref 9–18)
BASOPHILS # BLD: 0.1 K/UL (ref 0–0.2)
BASOPHILS NFR BLD: 1 % (ref 0–2)
BUN SERPL-MCNC: 13 MG/DL (ref 8–23)
CALCIUM SERPL-MCNC: 9.5 MG/DL (ref 8.8–10.2)
CHLORIDE SERPL-SCNC: 104 MMOL/L (ref 98–107)
CO2 SERPL-SCNC: 27 MMOL/L (ref 20–28)
CREAT SERPL-MCNC: 0.73 MG/DL (ref 0.6–1.1)
DIFFERENTIAL METHOD BLD: ABNORMAL
EKG ATRIAL RATE: 80 BPM
EKG ATRIAL RATE: 80 BPM
EKG DIAGNOSIS: NORMAL
EKG DIAGNOSIS: NORMAL
EKG P AXIS: 31 DEGREES
EKG P AXIS: 31 DEGREES
EKG P-R INTERVAL: 143 MS
EKG P-R INTERVAL: 143 MS
EKG Q-T INTERVAL: 388 MS
EKG Q-T INTERVAL: 388 MS
EKG QRS DURATION: 95 MS
EKG QRS DURATION: 95 MS
EKG QTC CALCULATION (BAZETT): 448 MS
EKG QTC CALCULATION (BAZETT): 448 MS
EKG R AXIS: 26 DEGREES
EKG R AXIS: 26 DEGREES
EKG T AXIS: 56 DEGREES
EKG T AXIS: 56 DEGREES
EKG VENTRICULAR RATE: 80 BPM
EKG VENTRICULAR RATE: 80 BPM
EOSINOPHIL # BLD: 0.1 K/UL (ref 0–0.8)
EOSINOPHIL NFR BLD: 1 % (ref 0.5–7.8)
ERYTHROCYTE [DISTWIDTH] IN BLOOD BY AUTOMATED COUNT: 11.8 % (ref 11.9–14.6)
GLUCOSE SERPL-MCNC: 93 MG/DL (ref 70–99)
HCT VFR BLD AUTO: 38 % (ref 35.8–46.3)
HGB BLD-MCNC: 13.3 G/DL (ref 11.7–15.4)
IMM GRANULOCYTES # BLD AUTO: 0.1 K/UL (ref 0–0.5)
IMM GRANULOCYTES NFR BLD AUTO: 1 % (ref 0–5)
LYMPHOCYTES # BLD: 1.5 K/UL (ref 0.5–4.6)
LYMPHOCYTES NFR BLD: 18 % (ref 13–44)
MCH RBC QN AUTO: 32.8 PG (ref 26.1–32.9)
MCHC RBC AUTO-ENTMCNC: 35 G/DL (ref 31.4–35)
MCV RBC AUTO: 93.6 FL (ref 82–102)
MONOCYTES # BLD: 0.9 K/UL (ref 0.1–1.3)
MONOCYTES NFR BLD: 10 % (ref 4–12)
NEUTS SEG # BLD: 5.8 K/UL (ref 1.7–8.2)
NEUTS SEG NFR BLD: 69 % (ref 43–78)
NRBC # BLD: 0 K/UL (ref 0–0.2)
PLATELET # BLD AUTO: 229 K/UL (ref 150–450)
PMV BLD AUTO: 8.3 FL (ref 9.4–12.3)
POTASSIUM SERPL-SCNC: 3.5 MMOL/L (ref 3.5–5.1)
RBC # BLD AUTO: 4.06 M/UL (ref 4.05–5.2)
SODIUM SERPL-SCNC: 141 MMOL/L (ref 136–145)
TROPONIN T SERPL HS-MCNC: 11 NG/L (ref 0–14)
WBC # BLD AUTO: 8.4 K/UL (ref 4.3–11.1)

## 2024-10-20 PROCEDURE — 93010 ELECTROCARDIOGRAM REPORT: CPT | Performed by: INTERNAL MEDICINE

## 2024-10-20 PROCEDURE — 72100 X-RAY EXAM L-S SPINE 2/3 VWS: CPT

## 2024-10-20 PROCEDURE — 85025 COMPLETE CBC W/AUTO DIFF WBC: CPT

## 2024-10-20 PROCEDURE — 72125 CT NECK SPINE W/O DYE: CPT

## 2024-10-20 PROCEDURE — 72072 X-RAY EXAM THORAC SPINE 3VWS: CPT

## 2024-10-20 PROCEDURE — 80048 BASIC METABOLIC PNL TOTAL CA: CPT

## 2024-10-20 PROCEDURE — 73030 X-RAY EXAM OF SHOULDER: CPT

## 2024-10-20 PROCEDURE — 70450 CT HEAD/BRAIN W/O DYE: CPT

## 2024-10-20 PROCEDURE — 93005 ELECTROCARDIOGRAM TRACING: CPT | Performed by: EMERGENCY MEDICINE

## 2024-10-20 PROCEDURE — 84484 ASSAY OF TROPONIN QUANT: CPT

## 2024-10-20 PROCEDURE — 6370000000 HC RX 637 (ALT 250 FOR IP): Performed by: EMERGENCY MEDICINE

## 2024-10-20 PROCEDURE — 6360000002 HC RX W HCPCS: Performed by: EMERGENCY MEDICINE

## 2024-10-20 PROCEDURE — 99285 EMERGENCY DEPT VISIT HI MDM: CPT

## 2024-10-20 PROCEDURE — 71046 X-RAY EXAM CHEST 2 VIEWS: CPT

## 2024-10-20 PROCEDURE — 96374 THER/PROPH/DIAG INJ IV PUSH: CPT

## 2024-10-20 RX ORDER — BUTALBITAL, ACETAMINOPHEN AND CAFFEINE 50; 325; 40 MG/1; MG/1; MG/1
2 TABLET ORAL
Status: COMPLETED | OUTPATIENT
Start: 2024-10-20 | End: 2024-10-20

## 2024-10-20 RX ORDER — CYCLOBENZAPRINE HCL 10 MG
5 TABLET ORAL
Status: COMPLETED | OUTPATIENT
Start: 2024-10-20 | End: 2024-10-20

## 2024-10-20 RX ORDER — KETOROLAC TROMETHAMINE 15 MG/ML
15 INJECTION, SOLUTION INTRAMUSCULAR; INTRAVENOUS
Status: COMPLETED | OUTPATIENT
Start: 2024-10-20 | End: 2024-10-20

## 2024-10-20 RX ADMIN — CYCLOBENZAPRINE HYDROCHLORIDE 5 MG: 10 TABLET, FILM COATED ORAL at 02:01

## 2024-10-20 RX ADMIN — BUTALBITAL, ACETAMINOPHEN, AND CAFFEINE 2 TABLET: 325; 50; 40 TABLET ORAL at 02:03

## 2024-10-20 RX ADMIN — KETOROLAC TROMETHAMINE 15 MG: 15 INJECTION, SOLUTION INTRAMUSCULAR; INTRAVENOUS at 02:02

## 2024-10-20 ASSESSMENT — PAIN SCALES - GENERAL: PAINLEVEL_OUTOF10: 8

## 2024-10-20 ASSESSMENT — PAIN DESCRIPTION - LOCATION: LOCATION: BACK

## 2024-10-20 NOTE — ED TRIAGE NOTES
Patient reports fall today, pt co right shoulder pain, neck pain and mid back pain.     Pt unsure if loc reports several falls in past weeks

## 2024-10-22 ENCOUNTER — CARE COORDINATION (OUTPATIENT)
Dept: CARE COORDINATION | Facility: CLINIC | Age: 79
End: 2024-10-22

## 2024-10-22 ENCOUNTER — TELEPHONE (OUTPATIENT)
Dept: BEHAVIORAL/MENTAL HEALTH CLINIC | Age: 79
End: 2024-10-22

## 2024-10-23 ENCOUNTER — TELEPHONE (OUTPATIENT)
Dept: BEHAVIORAL/MENTAL HEALTH CLINIC | Age: 79
End: 2024-10-23

## 2024-10-23 NOTE — TELEPHONE ENCOUNTER
Patient's brother called and was concerned about patient and the fact that she is more confused than normal and has fallen and gone to ER since visit.    Patient had previously called and I had scheduled a sooner appt for 10/25. Patient called yesterday and said she had other plans and would not be keeping this appt and needed to reschedule. Rescheduled for 11/5.    He stated that she had previously gone to [facility] and said she would never go back there again. Asked if there was another place to go. Advised she could reach out to [facility] if she was will or [facility] for evaluation or could go to ER and they could check her out and could be admitted if was needed. He advised that he knew she would not agree to any of it.

## 2024-10-28 ENCOUNTER — CARE COORDINATION (OUTPATIENT)
Dept: CARE COORDINATION | Facility: CLINIC | Age: 79
End: 2024-10-28

## 2024-10-28 NOTE — CARE COORDINATION
Final Vm left with pt regarding referral.  If no call back is received within 5 business days, referral will be closed.

## 2024-10-30 ASSESSMENT — ENCOUNTER SYMPTOMS
VOMITING: 0
EYE REDNESS: 0
STRIDOR: 0
NAUSEA: 0
EYE PAIN: 0
CHOKING: 0
FACIAL SWELLING: 0
BACK PAIN: 1
ABDOMINAL PAIN: 0

## 2024-10-30 NOTE — ED PROVIDER NOTES
Emergency Department Provider Note       PCP: Elmer Perkins MD   Age: 79 y.o.   Sex: female     DISPOSITION Decision To Discharge 10/20/2024 05:00:51 AM            ICD-10-CM    1. Fall, initial encounter  W19.XXXA       2. Chronic back pain, unspecified back location, unspecified back pain laterality  M54.9     G89.29           Medical Decision Making     Elderly lady with mechanical trip and fall radiography is normal she has generalized aches and pains.     1 acute, uncomplicated illness or injury.  Patient was discharged risks and benefits of hospitalization were considered.  Shared medical decision making was utilized in creating the patients health plan today.    I independently ordered and reviewed each unique test.  I reviewed external records: previous EKG including cardiologist interpretation.       I interpreted the X-rays x-rays of the left shoulder and lumbar/thoracic spine are without fracture dislocation or soft tissue swelling..  I interpreted the CT Scan cranial and cervical computerized tomography is negative for fracture, hemorrhage, dislocation..  My Independent EKG Interpretation: sinus rhythm, no evidence of arrhythmia, no acute changes, and non-specific EKG      ST Segments:Normal ST segments - NO STEMI   Rate: 80            History     79-year-old lady sustained a fall earlier today.  She is unsure whether she sustained a loss of consciousness but comes in with headache neck pain and her entire back hurting.  Patient also has pain about the right shoulder.  Pain worsens with movement, no remedies yet attempted.  There is been no nausea or vomiting to accompany the headache.        ROS     Review of Systems   HENT:  Negative for dental problem, facial swelling and nosebleeds.    Eyes:  Negative for pain and redness.   Respiratory:  Negative for choking and stridor.    Cardiovascular:  Negative for chest pain.   Gastrointestinal:  Negative for abdominal pain, nausea and vomiting.

## 2024-10-31 ENCOUNTER — OFFICE VISIT (OUTPATIENT)
Dept: FAMILY MEDICINE CLINIC | Facility: CLINIC | Age: 79
End: 2024-10-31

## 2024-10-31 VITALS
HEART RATE: 95 BPM | HEIGHT: 59 IN | OXYGEN SATURATION: 95 % | SYSTOLIC BLOOD PRESSURE: 138 MMHG | BODY MASS INDEX: 27.42 KG/M2 | DIASTOLIC BLOOD PRESSURE: 80 MMHG | WEIGHT: 136 LBS

## 2024-10-31 DIAGNOSIS — R60.0 EDEMA OF BOTH FEET: ICD-10-CM

## 2024-10-31 DIAGNOSIS — R19.7 DIARRHEA, UNSPECIFIED TYPE: Primary | ICD-10-CM

## 2024-10-31 DIAGNOSIS — E88.89 STEATOSIS (HCC): Chronic | ICD-10-CM

## 2024-10-31 DIAGNOSIS — I10 ESSENTIAL (PRIMARY) HYPERTENSION: Chronic | ICD-10-CM

## 2024-10-31 RX ORDER — FLUTICASONE PROPIONATE 50 MCG
SPRAY, SUSPENSION (ML) NASAL
COMMUNITY
Start: 2024-09-26

## 2024-10-31 NOTE — PROGRESS NOTES
been dictated via voice recognition software. Text and phrases may be limited by the accuracy and autoconversion of the software. The chart has been reviewed, but errors may still be present.

## 2024-11-04 ENCOUNTER — CARE COORDINATION (OUTPATIENT)
Dept: CARE COORDINATION | Facility: CLINIC | Age: 79
End: 2024-11-04

## 2024-11-05 ENCOUNTER — TELEPHONE (OUTPATIENT)
Dept: BEHAVIORAL/MENTAL HEALTH CLINIC | Age: 79
End: 2024-11-05

## 2024-11-05 ENCOUNTER — OFFICE VISIT (OUTPATIENT)
Dept: BEHAVIORAL/MENTAL HEALTH CLINIC | Age: 79
End: 2024-11-05
Payer: MEDICARE

## 2024-11-05 VITALS
OXYGEN SATURATION: 98 % | SYSTOLIC BLOOD PRESSURE: 138 MMHG | TEMPERATURE: 98.3 F | BODY MASS INDEX: 27.82 KG/M2 | HEIGHT: 59 IN | DIASTOLIC BLOOD PRESSURE: 86 MMHG | WEIGHT: 138 LBS | HEART RATE: 88 BPM

## 2024-11-05 DIAGNOSIS — F43.10 PTSD (POST-TRAUMATIC STRESS DISORDER): ICD-10-CM

## 2024-11-05 DIAGNOSIS — F51.05 INSOMNIA DUE TO MENTAL DISORDER: ICD-10-CM

## 2024-11-05 DIAGNOSIS — F41.1 GENERALIZED ANXIETY DISORDER: ICD-10-CM

## 2024-11-05 DIAGNOSIS — F31.31 BIPOLAR AFFECTIVE DISORDER, CURRENTLY DEPRESSED, MILD (HCC): Primary | ICD-10-CM

## 2024-11-05 PROCEDURE — G8400 PT W/DXA NO RESULTS DOC: HCPCS | Performed by: PSYCHIATRY & NEUROLOGY

## 2024-11-05 PROCEDURE — G8427 DOCREV CUR MEDS BY ELIG CLIN: HCPCS | Performed by: PSYCHIATRY & NEUROLOGY

## 2024-11-05 PROCEDURE — 3079F DIAST BP 80-89 MM HG: CPT | Performed by: PSYCHIATRY & NEUROLOGY

## 2024-11-05 PROCEDURE — G8484 FLU IMMUNIZE NO ADMIN: HCPCS | Performed by: PSYCHIATRY & NEUROLOGY

## 2024-11-05 PROCEDURE — G8417 CALC BMI ABV UP PARAM F/U: HCPCS | Performed by: PSYCHIATRY & NEUROLOGY

## 2024-11-05 PROCEDURE — 3075F SYST BP GE 130 - 139MM HG: CPT | Performed by: PSYCHIATRY & NEUROLOGY

## 2024-11-05 PROCEDURE — 1159F MED LIST DOCD IN RCRD: CPT | Performed by: PSYCHIATRY & NEUROLOGY

## 2024-11-05 PROCEDURE — 1123F ACP DISCUSS/DSCN MKR DOCD: CPT | Performed by: PSYCHIATRY & NEUROLOGY

## 2024-11-05 PROCEDURE — 99214 OFFICE O/P EST MOD 30 MIN: CPT | Performed by: PSYCHIATRY & NEUROLOGY

## 2024-11-05 PROCEDURE — 1090F PRES/ABSN URINE INCON ASSESS: CPT | Performed by: PSYCHIATRY & NEUROLOGY

## 2024-11-05 PROCEDURE — 1036F TOBACCO NON-USER: CPT | Performed by: PSYCHIATRY & NEUROLOGY

## 2024-11-05 RX ORDER — OLANZAPINE 5 MG/1
TABLET ORAL
Qty: 120 TABLET | Refills: 3 | Status: SHIPPED | OUTPATIENT
Start: 2024-11-05

## 2024-11-05 RX ORDER — LAMOTRIGINE 25 MG/1
TABLET ORAL
Qty: 60 TABLET | Refills: 3 | Status: CANCELLED | OUTPATIENT
Start: 2024-11-05

## 2024-11-05 RX ORDER — MECLIZINE HCL 12.5 MG 12.5 MG/1
12.5 TABLET ORAL 3 TIMES DAILY PRN
COMMUNITY

## 2024-11-05 ASSESSMENT — ANXIETY QUESTIONNAIRES
GAD7 TOTAL SCORE: 8
1. FEELING NERVOUS, ANXIOUS, OR ON EDGE: SEVERAL DAYS
4. TROUBLE RELAXING: NOT AT ALL
IF YOU CHECKED OFF ANY PROBLEMS ON THIS QUESTIONNAIRE, HOW DIFFICULT HAVE THESE PROBLEMS MADE IT FOR YOU TO DO YOUR WORK, TAKE CARE OF THINGS AT HOME, OR GET ALONG WITH OTHER PEOPLE: NOT DIFFICULT AT ALL
5. BEING SO RESTLESS THAT IT IS HARD TO SIT STILL: NOT AT ALL
3. WORRYING TOO MUCH ABOUT DIFFERENT THINGS: NEARLY EVERY DAY
2. NOT BEING ABLE TO STOP OR CONTROL WORRYING: NEARLY EVERY DAY
7. FEELING AFRAID AS IF SOMETHING AWFUL MIGHT HAPPEN: NOT AT ALL
6. BECOMING EASILY ANNOYED OR IRRITABLE: SEVERAL DAYS

## 2024-11-05 ASSESSMENT — PATIENT HEALTH QUESTIONNAIRE - PHQ9
SUM OF ALL RESPONSES TO PHQ9 QUESTIONS 1 & 2: 0
1. LITTLE INTEREST OR PLEASURE IN DOING THINGS: NOT AT ALL
8. MOVING OR SPEAKING SO SLOWLY THAT OTHER PEOPLE COULD HAVE NOTICED. OR THE OPPOSITE, BEING SO FIGETY OR RESTLESS THAT YOU HAVE BEEN MOVING AROUND A LOT MORE THAN USUAL: MORE THAN HALF THE DAYS
4. FEELING TIRED OR HAVING LITTLE ENERGY: NEARLY EVERY DAY
SUM OF ALL RESPONSES TO PHQ QUESTIONS 1-9: 12
7. TROUBLE CONCENTRATING ON THINGS, SUCH AS READING THE NEWSPAPER OR WATCHING TELEVISION: SEVERAL DAYS
2. FEELING DOWN, DEPRESSED OR HOPELESS: NOT AT ALL
6. FEELING BAD ABOUT YOURSELF - OR THAT YOU ARE A FAILURE OR HAVE LET YOURSELF OR YOUR FAMILY DOWN: NOT AT ALL
SUM OF ALL RESPONSES TO PHQ QUESTIONS 1-9: 12
SUM OF ALL RESPONSES TO PHQ QUESTIONS 1-9: 12
5. POOR APPETITE OR OVEREATING: NEARLY EVERY DAY
9. THOUGHTS THAT YOU WOULD BE BETTER OFF DEAD, OR OF HURTING YOURSELF: NOT AT ALL
SUM OF ALL RESPONSES TO PHQ QUESTIONS 1-9: 12
3. TROUBLE FALLING OR STAYING ASLEEP: NEARLY EVERY DAY
10. IF YOU CHECKED OFF ANY PROBLEMS, HOW DIFFICULT HAVE THESE PROBLEMS MADE IT FOR YOU TO DO YOUR WORK, TAKE CARE OF THINGS AT HOME, OR GET ALONG WITH OTHER PEOPLE: NOT DIFFICULT AT ALL

## 2024-11-05 NOTE — PROGRESS NOTES
SUSPICIOUS      [] HOSTILE                            BEHAVIOR:   [x] CALM   [] HYPERACTIVE   [] MANNERISMS      [] BIZZARE         SPEECH:   [x] NORMAL FOR CLIENT   [] SPONTANEOUS   [] SLURRED   [] WHISPERING      [] LOUD   [] PRESSURED   [] ARTICULATE        EYE CONTACT:   [x] WNL   [] BLANK STARE   [] INTENSE      [] AVOIDANT         MOOD:   [] EUTHYMIC   [x] ANXIOUS   [] DEPRESSED      [] IRRITABLE   [] ANGRY   [] APATHETIC     AFFECT:   [x] CONGRUENT WITH MOOD   [] FLAT   [] CONSTRICTED      [] INAPPROPRIATE   [] LABILE           THOUGHT PROCESS:   [] LOGICAL/GOAL-DIRECTED   [] FOI   [] CIRCUMSANTIAL      [] INCOHERENT   [x] TANGENTIAL   [] CONCRETE      [] PERSEVERATION           THOUGHT CONTENT:                DELUSIONS  [x] DENIES  [] GRANDIOSE  [] PERSECUTORY  [] Hindu  [] REFERENCE   HALLUCINATIONS  [x] DENIES  [] AUDITORY  [] VISUAL  [] OLFACTORY  [] TACTILE     [] GUSTATORY  [] SOMATIC         OBSESSIONS  [x] DENIES  [] PRESENT         SUICIDAL IDEATION  [x] DENIES  [] PRESENT W/O PLAN  [] PRESENT W/ PLAN       HOMICIDAL IDEATION  [x] DENIES  [] PRESENT W/O PLAN  [] PRESENT W/ PLAN           JUDGEMENT:   [x] GOOD   [] FAIR   [] POOR   INSIGHT:   [x] GOOD   [] FAIR   [] POOR     COGNITION:           SENSORIUM:   [x] ALERT   [] CLOUDED   [] DROWSY     ORIENTATION:   [x] INTACT   [] TIME:  PLACE  PERSON   RECENT & REMOTE MEMORY:   [] NORMAL   [x] OTHER:                  ATTENTION:   [] INTACT   [x] MILD IMPAIRMENT   [] SEVERE IMPAIRMENT     CONCENTRATION:   [] INTACT   [x] MILD IMPAIRMENT   [] SEVERE IMPAIRMENT     LANGUAGE:   [x] AVERAGE   [] ABOVE AVERAGE   [] BELOW AVERAGE     FUND OF KNOWLEDGE:   [] UNABLE TO ASSESS AT THIS TIME   [x] AVERAGE   [] ABOVE AVERAGE   [] BELOW AVERAGE      [] GOOD TO EXCELLENT KNOWLEDGE OF CURRENT EVENTS   [] POOR TO NO KNLEDGE OF CURRENT EVENTS                                          ABNORMAL MOVEMENTS:   [x] NONE   [] TICS   [] TREMORS   [] BIZZARE      [] FACE   []

## 2024-11-07 ENCOUNTER — TELEPHONE (OUTPATIENT)
Dept: FAMILY MEDICINE CLINIC | Facility: CLINIC | Age: 79
End: 2024-11-07

## 2024-11-07 DIAGNOSIS — R42 DIZZINESS: Primary | ICD-10-CM

## 2024-11-07 DIAGNOSIS — G89.29 CHRONIC LOW BACK PAIN, UNSPECIFIED BACK PAIN LATERALITY, UNSPECIFIED WHETHER SCIATICA PRESENT: ICD-10-CM

## 2024-11-07 DIAGNOSIS — R60.0 EDEMA OF BOTH LEGS: ICD-10-CM

## 2024-11-07 DIAGNOSIS — R06.02 SOB (SHORTNESS OF BREATH): ICD-10-CM

## 2024-11-07 DIAGNOSIS — J44.9 CHRONIC OBSTRUCTIVE PULMONARY DISEASE, UNSPECIFIED COPD TYPE (HCC): ICD-10-CM

## 2024-11-07 DIAGNOSIS — M54.50 CHRONIC LOW BACK PAIN, UNSPECIFIED BACK PAIN LATERALITY, UNSPECIFIED WHETHER SCIATICA PRESENT: ICD-10-CM

## 2024-11-07 DIAGNOSIS — G31.84 MILD COGNITIVE IMPAIRMENT: ICD-10-CM

## 2024-11-11 ENCOUNTER — TELEPHONE (OUTPATIENT)
Dept: FAMILY MEDICINE CLINIC | Facility: CLINIC | Age: 79
End: 2024-11-11

## 2024-11-11 DIAGNOSIS — R06.02 SOB (SHORTNESS OF BREATH): ICD-10-CM

## 2024-11-11 DIAGNOSIS — R53.1 WEAKNESS: ICD-10-CM

## 2024-11-11 DIAGNOSIS — Z79.899 LONG TERM CURRENT USE OF ANTIPSYCHOTIC MEDICATION: ICD-10-CM

## 2024-11-11 DIAGNOSIS — R42 DIZZINESS: ICD-10-CM

## 2024-11-11 DIAGNOSIS — F31.70 BIPOLAR AFFECTIVE DISORDER IN REMISSION (HCC): ICD-10-CM

## 2024-11-11 DIAGNOSIS — G31.84 MILD COGNITIVE IMPAIRMENT: Primary | ICD-10-CM

## 2024-11-14 ENCOUNTER — TELEPHONE (OUTPATIENT)
Dept: FAMILY MEDICINE CLINIC | Facility: CLINIC | Age: 79
End: 2024-11-14

## 2024-11-14 DIAGNOSIS — G31.84 MILD COGNITIVE IMPAIRMENT: Primary | ICD-10-CM

## 2024-11-14 DIAGNOSIS — F31.70 BIPOLAR AFFECTIVE DISORDER IN REMISSION (HCC): ICD-10-CM

## 2024-11-14 DIAGNOSIS — R60.0 EDEMA OF BOTH FEET: ICD-10-CM

## 2024-11-14 DIAGNOSIS — R53.1 WEAKNESS: ICD-10-CM

## 2024-11-14 DIAGNOSIS — R42 DIZZINESS: ICD-10-CM

## 2024-11-14 NOTE — TELEPHONE ENCOUNTER
HOME HEALTH NURSE CALL      [facility]      Best Contact Number to Reach the Nurse: [phone]          Reason For Call: Would like to have written orders placed for a bedside commode; patient's toilet is too low and this will cause the patient to fall so a commode is necessary. Patient's brother called and requested one on 11/11/24.    If the written orders could be faxed over to a DME company and notify the nurse of which one so that they can follow up with them.

## 2024-11-21 ENCOUNTER — TELEPHONE (OUTPATIENT)
Dept: FAMILY MEDICINE CLINIC | Facility: CLINIC | Age: 79
End: 2024-11-21

## 2024-11-21 NOTE — TELEPHONE ENCOUNTER
Alicia called back to follow up on commode order discussed in telephone encounter dated 11/14. TC stated that the original order was faxed to [email or web address] but clarified that they needed a new order as the first one was incomplete. New order printed and faxed to [email or web address] at the following fax number: [phone]. Alicia was notified via PHI compliant voicemail.

## 2024-12-05 ENCOUNTER — TELEPHONE (OUTPATIENT)
Dept: FAMILY MEDICINE CLINIC | Facility: CLINIC | Age: 79
End: 2024-12-05

## 2024-12-06 ENCOUNTER — TELEPHONE (OUTPATIENT)
Dept: FAMILY MEDICINE CLINIC | Facility: CLINIC | Age: 79
End: 2024-12-06

## 2024-12-06 DIAGNOSIS — R29.6 FREQUENT FALLS: ICD-10-CM

## 2024-12-06 DIAGNOSIS — R53.1 WEAKNESS: ICD-10-CM

## 2024-12-06 DIAGNOSIS — R42 DIZZINESS: Primary | ICD-10-CM

## 2024-12-06 DIAGNOSIS — R60.0 EDEMA OF BOTH FEET: ICD-10-CM

## 2024-12-06 RX ORDER — CALCIUM CARBONATE 160(400)MG
1 TABLET,CHEWABLE ORAL ONCE
Qty: 1 EACH | Refills: 0 | Status: SHIPPED | OUTPATIENT
Start: 2024-12-06 | End: 2024-12-06

## 2024-12-06 NOTE — TELEPHONE ENCOUNTER
Ayana with [facility] [phone], requesting order for  a Rollator Walker  Please send to order sourceasy [facility]

## 2024-12-16 ENCOUNTER — TELEPHONE (OUTPATIENT)
Dept: FAMILY MEDICINE CLINIC | Facility: CLINIC | Age: 79
End: 2024-12-16

## 2024-12-16 NOTE — TELEPHONE ENCOUNTER
Brother called back in regards of the patient needing a walker. Walter spoke with Luis Eduardo earlier in the day regarding a walker order. He called back to give a fax order #- [phone]  Email- [email or web address]  [facility]- [phone]    Walter's phone #[phone]

## 2024-12-18 ENCOUNTER — APPOINTMENT (OUTPATIENT)
Dept: URBAN - METROPOLITAN AREA CLINIC 330 | Facility: CLINIC | Age: 79
Setting detail: DERMATOLOGY
End: 2024-12-18

## 2024-12-18 DIAGNOSIS — D69.2 OTHER NONTHROMBOCYTOPENIC PURPURA: ICD-10-CM

## 2024-12-18 DIAGNOSIS — D22 MELANOCYTIC NEVI: ICD-10-CM

## 2024-12-18 DIAGNOSIS — L82.1 OTHER SEBORRHEIC KERATOSIS: ICD-10-CM

## 2024-12-18 PROBLEM — D22.62 MELANOCYTIC NEVI OF LEFT UPPER LIMB, INCLUDING SHOULDER: Status: ACTIVE | Noted: 2024-12-18

## 2024-12-18 PROBLEM — D22.5 MELANOCYTIC NEVI OF TRUNK: Status: ACTIVE | Noted: 2024-12-18

## 2024-12-18 PROCEDURE — ? TREATMENT REGIMEN

## 2024-12-18 PROCEDURE — 99213 OFFICE O/P EST LOW 20 MIN: CPT

## 2024-12-18 PROCEDURE — ? COUNSELING

## 2024-12-18 ASSESSMENT — LOCATION SIMPLE DESCRIPTION DERM
LOCATION SIMPLE: LEFT FOREARM
LOCATION SIMPLE: RIGHT FOREARM
LOCATION SIMPLE: ABDOMEN
LOCATION SIMPLE: LEFT UPPER BACK
LOCATION SIMPLE: LEFT SHOULDER
LOCATION SIMPLE: CHEST

## 2024-12-18 ASSESSMENT — LOCATION DETAILED DESCRIPTION DERM
LOCATION DETAILED: RIGHT PROXIMAL DORSAL FOREARM
LOCATION DETAILED: LEFT PROXIMAL DORSAL FOREARM
LOCATION DETAILED: RIGHT MEDIAL SUPERIOR CHEST
LOCATION DETAILED: LEFT POSTERIOR SHOULDER
LOCATION DETAILED: LEFT SUPERIOR MEDIAL UPPER BACK
LOCATION DETAILED: EPIGASTRIC SKIN

## 2024-12-18 ASSESSMENT — LOCATION ZONE DERM
LOCATION ZONE: TRUNK
LOCATION ZONE: ARM

## 2024-12-26 ENCOUNTER — OFFICE VISIT (OUTPATIENT)
Dept: FAMILY MEDICINE CLINIC | Facility: CLINIC | Age: 79
End: 2024-12-26

## 2024-12-26 VITALS
HEIGHT: 59 IN | OXYGEN SATURATION: 97 % | DIASTOLIC BLOOD PRESSURE: 80 MMHG | WEIGHT: 134.2 LBS | TEMPERATURE: 97.8 F | HEART RATE: 94 BPM | SYSTOLIC BLOOD PRESSURE: 128 MMHG | BODY MASS INDEX: 27.05 KG/M2

## 2024-12-26 DIAGNOSIS — Z00.00 MEDICARE ANNUAL WELLNESS VISIT, SUBSEQUENT: Primary | ICD-10-CM

## 2024-12-26 ASSESSMENT — PATIENT HEALTH QUESTIONNAIRE - PHQ9
SUM OF ALL RESPONSES TO PHQ9 QUESTIONS 1 & 2: 0
9. THOUGHTS THAT YOU WOULD BE BETTER OFF DEAD, OR OF HURTING YOURSELF: NEARLY EVERY DAY
6. FEELING BAD ABOUT YOURSELF - OR THAT YOU ARE A FAILURE OR HAVE LET YOURSELF OR YOUR FAMILY DOWN: NOT AT ALL
8. MOVING OR SPEAKING SO SLOWLY THAT OTHER PEOPLE COULD HAVE NOTICED. OR THE OPPOSITE, BEING SO FIGETY OR RESTLESS THAT YOU HAVE BEEN MOVING AROUND A LOT MORE THAN USUAL: NEARLY EVERY DAY
4. FEELING TIRED OR HAVING LITTLE ENERGY: MORE THAN HALF THE DAYS
SUM OF ALL RESPONSES TO PHQ QUESTIONS 1-9: 14
SUM OF ALL RESPONSES TO PHQ QUESTIONS 1-9: 14
3. TROUBLE FALLING OR STAYING ASLEEP: NOT AT ALL
2. FEELING DOWN, DEPRESSED OR HOPELESS: NOT AT ALL
7. TROUBLE CONCENTRATING ON THINGS, SUCH AS READING THE NEWSPAPER OR WATCHING TELEVISION: NEARLY EVERY DAY
SUM OF ALL RESPONSES TO PHQ QUESTIONS 1-9: 14
5. POOR APPETITE OR OVEREATING: NEARLY EVERY DAY
10. IF YOU CHECKED OFF ANY PROBLEMS, HOW DIFFICULT HAVE THESE PROBLEMS MADE IT FOR YOU TO DO YOUR WORK, TAKE CARE OF THINGS AT HOME, OR GET ALONG WITH OTHER PEOPLE: SOMEWHAT DIFFICULT
SUM OF ALL RESPONSES TO PHQ QUESTIONS 1-9: 11
1. LITTLE INTEREST OR PLEASURE IN DOING THINGS: NOT AT ALL

## 2024-12-26 ASSESSMENT — LIFESTYLE VARIABLES
HOW OFTEN DO YOU HAVE A DRINK CONTAINING ALCOHOL: NEVER
HOW MANY STANDARD DRINKS CONTAINING ALCOHOL DO YOU HAVE ON A TYPICAL DAY: PATIENT DOES NOT DRINK

## 2024-12-26 ASSESSMENT — COLUMBIA-SUICIDE SEVERITY RATING SCALE - C-SSRS
1. WITHIN THE PAST MONTH, HAVE YOU WISHED YOU WERE DEAD OR WISHED YOU COULD GO TO SLEEP AND NOT WAKE UP?: NO
2. HAVE YOU ACTUALLY HAD ANY THOUGHTS OF KILLING YOURSELF?: NO

## 2024-12-26 NOTE — PROGRESS NOTES
night.. Yes Mary Sinha MD   fluticasone (FLONASE) 50 MCG/ACT nasal spray USE 2 SPRAYS INTO EACH NOSTRIL DAILY Yes Mignon Burks MD   meloxicam (MOBIC) 7.5 MG tablet Take 1 tablet by mouth daily Yes Sandee Haynes APRN - CNP   acetaminophen (TYLENOL) 500 MG tablet Take 1 tablet by mouth every 6 hours as needed for Pain Yes Mignon Burks MD   Magnesium 500 MG TABS Take 500 mg by mouth daily. Yes Mignon Burks MD   VITAMIN A PO Take 10,000 Units by mouth daily. Yes Mignon Burks MD   DHEA 50 MG CAPS Take 100 mg by mouth every morning Yes Mignon Burks MD   Coenzyme Q10 (COQ10 PO) Take 1 tablet by mouth 2 times daily Yes Mignon Burks MD   CPAP Machine MISC by Other route Yes Mignon Burks MD   Iodine, Kelp, 0.15 MG TABS Take 12.5 mg by mouth daily Yes Mignon Burks MD   Lactobacillus Acidophilus POWD Take 2 capsules by mouth daily Yes Mignon Burks MD   Polyethylene Glycol 1000 LIQD Take 1 Dose by mouth daily as needed (constipation) Yes ProviderMignon MD   Pregnenolone 50 MG TABS Take 100 mg by mouth every morning Yes Mignon Burks MD   vitamin D (CHOLECALCIFEROL) 25 MCG (1000 UT) TABS tablet Take 2 tablets by mouth daily Takes 2-3 tabs Yes Automatic Reconciliation, Ar   cyanocobalamin 500 MCG tablet Take 1 tablet by mouth daily Yes Automatic Reconciliation, Ar   diphenhydrAMINE (BENADRYL) 25 MG capsule Take 1 capsule by mouth every 6 hours as needed for Allergies Yes Automatic Reconciliation, Ar   fexofenadine (ALLEGRA) 180 MG tablet Take 1 tablet by mouth daily Yes Automatic Reconciliation, Ar   melatonin 3 MG TABS tablet Take 1 tablet by mouth nightly as needed (sleep) Takes 2 tabs Yes Automatic Reconciliation, Ar   vitamin E 1000 units capsule Take 400 Units by mouth daily Yes Automatic Reconciliation, Ar   Misc. Devices (ROLLATOR ULTRA-LIGHT) MISC 1 each by Does not apply route once for 1 dose  Elmer Perkins MD

## 2025-01-17 ENCOUNTER — TELEPHONE (OUTPATIENT)
Dept: FAMILY MEDICINE CLINIC | Facility: CLINIC | Age: 80
End: 2025-01-17

## 2025-01-17 NOTE — TELEPHONE ENCOUNTER
Patient is calling the office in regards to her CoQ 10. She has completely stopped all medications that she was taking except for    OLANZapine (ZYPREXA) 5 MG tablet [[id number]]    Order Details    Dose, Route, Frequency: As Directed   Dispense Quantity: 120 tablet Refills: 3          Sig: Take 1 tablet by mouth every morning AND 3 tablets at bedtime. Take 5 mg ( one tablet) in the am and 15 mg (3 tablets) at night..         Patient has been very sick 2 weeks ago and is having problems remembering anything. She is repeating herself. She would like someone to call her about the CoQ 10. Patient stated she was nauseated   and called Dr. Sinha about this and they prescribed the generic Zofran for her and she said that helped but she didn't get an answer back about this. So she wanted to see what Dr. Perkins would say.

## 2025-01-23 ENCOUNTER — OFFICE VISIT (OUTPATIENT)
Dept: FAMILY MEDICINE CLINIC | Facility: CLINIC | Age: 80
End: 2025-01-23

## 2025-01-23 VITALS
WEIGHT: 131 LBS | HEIGHT: 59 IN | DIASTOLIC BLOOD PRESSURE: 92 MMHG | HEART RATE: 91 BPM | TEMPERATURE: 98 F | BODY MASS INDEX: 26.41 KG/M2 | OXYGEN SATURATION: 96 % | SYSTOLIC BLOOD PRESSURE: 150 MMHG

## 2025-01-23 DIAGNOSIS — M25.551 PAIN OF RIGHT HIP: ICD-10-CM

## 2025-01-23 DIAGNOSIS — J44.89 COPD WITH ASTHMA (HCC): ICD-10-CM

## 2025-01-23 DIAGNOSIS — M54.50 CHRONIC LOW BACK PAIN, UNSPECIFIED BACK PAIN LATERALITY, UNSPECIFIED WHETHER SCIATICA PRESENT: ICD-10-CM

## 2025-01-23 DIAGNOSIS — F03.B0 MODERATE DEMENTIA, UNSPECIFIED DEMENTIA TYPE, UNSPECIFIED WHETHER BEHAVIORAL, PSYCHOTIC, OR MOOD DISTURBANCE OR ANXIETY (HCC): Primary | ICD-10-CM

## 2025-01-23 DIAGNOSIS — F22 PARANOID BEHAVIOR (HCC): ICD-10-CM

## 2025-01-23 DIAGNOSIS — G89.29 CHRONIC LOW BACK PAIN, UNSPECIFIED BACK PAIN LATERALITY, UNSPECIFIED WHETHER SCIATICA PRESENT: ICD-10-CM

## 2025-01-23 DIAGNOSIS — E88.89 STEATOSIS (HCC): ICD-10-CM

## 2025-01-23 DIAGNOSIS — M79.671 PAIN OF RIGHT MIDFOOT: ICD-10-CM

## 2025-01-23 DIAGNOSIS — K59.00 CONSTIPATION, UNSPECIFIED CONSTIPATION TYPE: ICD-10-CM

## 2025-01-23 RX ORDER — ONDANSETRON 4 MG/1
4 TABLET, FILM COATED ORAL EVERY 8 HOURS PRN
COMMUNITY

## 2025-01-23 RX ORDER — MELOXICAM 7.5 MG/1
7.5 TABLET ORAL DAILY
Qty: 30 TABLET | Refills: 1 | Status: SHIPPED | OUTPATIENT
Start: 2025-01-23

## 2025-01-23 SDOH — ECONOMIC STABILITY: FOOD INSECURITY: WITHIN THE PAST 12 MONTHS, YOU WORRIED THAT YOUR FOOD WOULD RUN OUT BEFORE YOU GOT MONEY TO BUY MORE.: NEVER TRUE

## 2025-01-23 SDOH — ECONOMIC STABILITY: FOOD INSECURITY: WITHIN THE PAST 12 MONTHS, THE FOOD YOU BOUGHT JUST DIDN'T LAST AND YOU DIDN'T HAVE MONEY TO GET MORE.: NEVER TRUE

## 2025-01-23 ASSESSMENT — PATIENT HEALTH QUESTIONNAIRE - PHQ9: DEPRESSION UNABLE TO ASSESS: FUNCTIONAL CAPACITY MOTIVATION LIMITS ACCURACY

## 2025-01-23 NOTE — PROGRESS NOTES
Elmer Perkins MD  [facility]  [address] [place] [address]  [phone]    Assessment/Plan: Meredith Toribio was seen today for:    1. Moderate dementia, unspecified dementia type, unspecified whether behavioral, psychotic, or mood disturbance or anxiety (HCC) - patient is more coherent today than prior visits. Encouraged her to keep doing what she is currently doing. Brother says it is using CPAP for more time.  2. Constipation - encouraged patient to increase miralax until she is having regular soft bowel movements.  3. Chronic low back pain, unspecified back pain laterality, unspecified whether sciatica present - retry meloxicam. Reassured patient that it is not addictive. Also she is concerned about kidney function, but that is doing ok per most recent labs.  -     meloxicam (MOBIC) 7.5 MG tablet; Take 1 tablet by mouth daily, Disp-30 tablet, R-1Normal  4. Steatosis (HCC) - chronic, but possibly related to current nausea and vomitting.  5. Paranoid behavior (HCC) - stable on Olanzapine.  6. COPD with asthma (HCC) - stable.  7. Pain of right hip - had PT working with her, but now finished. Encouraged her to try meloxicam.  -     meloxicam (MOBIC) 7.5 MG tablet; Take 1 tablet by mouth daily, Disp-30 tablet, R-1Normal  8. Pain of midfoot - possibly improving. May need to x-ray if it persists.    Return in about 1 month (around 2025) for recheck.     On 25 I have spent 42 minutes reviewing previous notes, test results and face to face with the patient (and any present family or support) discussing the diagnosis and plan.    HPI: Meredith Toribio (: 1945) is a 79 y.o. female here for evaluation of the following chief complaint(s): Follow-up (Has had increased nausea, has been taking zofran twice daily for the past few days. Slight swelling on right calf. Weight loss)   Taking zofran for nausea. Thinks CoQ-10. Stopped all her vitamins for a while. Now back on them.

## 2025-01-31 ENCOUNTER — OFFICE VISIT (OUTPATIENT)
Dept: BEHAVIORAL/MENTAL HEALTH CLINIC | Age: 80
End: 2025-01-31
Payer: MEDICARE

## 2025-01-31 VITALS
BODY MASS INDEX: 25.8 KG/M2 | OXYGEN SATURATION: 98 % | HEART RATE: 83 BPM | DIASTOLIC BLOOD PRESSURE: 84 MMHG | TEMPERATURE: 98.6 F | HEIGHT: 59 IN | SYSTOLIC BLOOD PRESSURE: 136 MMHG | WEIGHT: 128 LBS

## 2025-01-31 DIAGNOSIS — F43.10 PTSD (POST-TRAUMATIC STRESS DISORDER): ICD-10-CM

## 2025-01-31 DIAGNOSIS — F41.1 GENERALIZED ANXIETY DISORDER: ICD-10-CM

## 2025-01-31 DIAGNOSIS — Z63.4 BEREAVEMENT: ICD-10-CM

## 2025-01-31 DIAGNOSIS — F31.31 BIPOLAR AFFECTIVE DISORDER, CURRENTLY DEPRESSED, MILD (HCC): Primary | ICD-10-CM

## 2025-01-31 DIAGNOSIS — F51.05 INSOMNIA DUE TO MENTAL DISORDER: ICD-10-CM

## 2025-01-31 PROCEDURE — 1159F MED LIST DOCD IN RCRD: CPT | Performed by: PSYCHIATRY & NEUROLOGY

## 2025-01-31 PROCEDURE — 99214 OFFICE O/P EST MOD 30 MIN: CPT | Performed by: PSYCHIATRY & NEUROLOGY

## 2025-01-31 PROCEDURE — G8427 DOCREV CUR MEDS BY ELIG CLIN: HCPCS | Performed by: PSYCHIATRY & NEUROLOGY

## 2025-01-31 PROCEDURE — 1090F PRES/ABSN URINE INCON ASSESS: CPT | Performed by: PSYCHIATRY & NEUROLOGY

## 2025-01-31 PROCEDURE — 3075F SYST BP GE 130 - 139MM HG: CPT | Performed by: PSYCHIATRY & NEUROLOGY

## 2025-01-31 PROCEDURE — G8400 PT W/DXA NO RESULTS DOC: HCPCS | Performed by: PSYCHIATRY & NEUROLOGY

## 2025-01-31 PROCEDURE — G8417 CALC BMI ABV UP PARAM F/U: HCPCS | Performed by: PSYCHIATRY & NEUROLOGY

## 2025-01-31 PROCEDURE — 1036F TOBACCO NON-USER: CPT | Performed by: PSYCHIATRY & NEUROLOGY

## 2025-01-31 PROCEDURE — 3079F DIAST BP 80-89 MM HG: CPT | Performed by: PSYCHIATRY & NEUROLOGY

## 2025-01-31 PROCEDURE — 1123F ACP DISCUSS/DSCN MKR DOCD: CPT | Performed by: PSYCHIATRY & NEUROLOGY

## 2025-01-31 RX ORDER — OLANZAPINE 5 MG/1
TABLET ORAL
Qty: 120 TABLET | Refills: 3 | Status: SHIPPED | OUTPATIENT
Start: 2025-01-31

## 2025-01-31 RX ORDER — LAMOTRIGINE 25 MG/1
TABLET ORAL
Qty: 60 TABLET | Refills: 3 | Status: CANCELLED | OUTPATIENT
Start: 2025-01-31

## 2025-01-31 SDOH — SOCIAL STABILITY - SOCIAL INSECURITY: DISSAPEARANCE AND DEATH OF FAMILY MEMBER: Z63.4

## 2025-01-31 ASSESSMENT — ANXIETY QUESTIONNAIRES
7. FEELING AFRAID AS IF SOMETHING AWFUL MIGHT HAPPEN: MORE THAN HALF THE DAYS
4. TROUBLE RELAXING: MORE THAN HALF THE DAYS
5. BEING SO RESTLESS THAT IT IS HARD TO SIT STILL: NOT AT ALL
3. WORRYING TOO MUCH ABOUT DIFFERENT THINGS: MORE THAN HALF THE DAYS
1. FEELING NERVOUS, ANXIOUS, OR ON EDGE: NEARLY EVERY DAY
2. NOT BEING ABLE TO STOP OR CONTROL WORRYING: MORE THAN HALF THE DAYS
GAD7 TOTAL SCORE: 14
6. BECOMING EASILY ANNOYED OR IRRITABLE: NEARLY EVERY DAY
IF YOU CHECKED OFF ANY PROBLEMS ON THIS QUESTIONNAIRE, HOW DIFFICULT HAVE THESE PROBLEMS MADE IT FOR YOU TO DO YOUR WORK, TAKE CARE OF THINGS AT HOME, OR GET ALONG WITH OTHER PEOPLE: NOT DIFFICULT AT ALL

## 2025-01-31 ASSESSMENT — PATIENT HEALTH QUESTIONNAIRE - PHQ9
7. TROUBLE CONCENTRATING ON THINGS, SUCH AS READING THE NEWSPAPER OR WATCHING TELEVISION: SEVERAL DAYS
10. IF YOU CHECKED OFF ANY PROBLEMS, HOW DIFFICULT HAVE THESE PROBLEMS MADE IT FOR YOU TO DO YOUR WORK, TAKE CARE OF THINGS AT HOME, OR GET ALONG WITH OTHER PEOPLE: VERY DIFFICULT
8. MOVING OR SPEAKING SO SLOWLY THAT OTHER PEOPLE COULD HAVE NOTICED. OR THE OPPOSITE, BEING SO FIGETY OR RESTLESS THAT YOU HAVE BEEN MOVING AROUND A LOT MORE THAN USUAL: MORE THAN HALF THE DAYS
4. FEELING TIRED OR HAVING LITTLE ENERGY: MORE THAN HALF THE DAYS
2. FEELING DOWN, DEPRESSED OR HOPELESS: SEVERAL DAYS
3. TROUBLE FALLING OR STAYING ASLEEP: MORE THAN HALF THE DAYS
5. POOR APPETITE OR OVEREATING: NEARLY EVERY DAY
SUM OF ALL RESPONSES TO PHQ QUESTIONS 1-9: 13
1. LITTLE INTEREST OR PLEASURE IN DOING THINGS: NOT AT ALL
9. THOUGHTS THAT YOU WOULD BE BETTER OFF DEAD, OR OF HURTING YOURSELF: NOT AT ALL
SUM OF ALL RESPONSES TO PHQ9 QUESTIONS 1 & 2: 1
SUM OF ALL RESPONSES TO PHQ QUESTIONS 1-9: 13
6. FEELING BAD ABOUT YOURSELF - OR THAT YOU ARE A FAILURE OR HAVE LET YOURSELF OR YOUR FAMILY DOWN: MORE THAN HALF THE DAYS
SUM OF ALL RESPONSES TO PHQ QUESTIONS 1-9: 13
SUM OF ALL RESPONSES TO PHQ QUESTIONS 1-9: 13

## 2025-01-31 NOTE — PROGRESS NOTES
in part or whole using voice recognition software. Proofread however unrecognized errors in transcription may be present.    Mary Sinha MD

## 2025-02-18 ENCOUNTER — TELEPHONE (OUTPATIENT)
Dept: BEHAVIORAL/MENTAL HEALTH CLINIC | Age: 80
End: 2025-02-18
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
## 2025-02-26 ENCOUNTER — TELEPHONE (OUTPATIENT)
Dept: FAMILY MEDICINE CLINIC | Facility: CLINIC | Age: 80
End: 2025-02-26

## 2025-02-26 NOTE — TELEPHONE ENCOUNTER
Patient called to verify doctors appointment on Thursday with . I made patient aware of the appointment but she still wanted a phone call. I advised Galilea

## 2025-02-26 NOTE — TELEPHONE ENCOUNTER
Patient was called to confirm her appointment and wanted a call back.      MA called her back and she wanted to confirm that Dr. Perkins will be there.  It was confirmed she would  Patient states she is having blood pressure issues.

## 2025-02-27 ENCOUNTER — OFFICE VISIT (OUTPATIENT)
Dept: FAMILY MEDICINE CLINIC | Facility: CLINIC | Age: 80
End: 2025-02-27

## 2025-02-27 VITALS
HEIGHT: 59 IN | OXYGEN SATURATION: 99 % | HEART RATE: 84 BPM | DIASTOLIC BLOOD PRESSURE: 80 MMHG | BODY MASS INDEX: 25.68 KG/M2 | WEIGHT: 127.4 LBS | SYSTOLIC BLOOD PRESSURE: 130 MMHG

## 2025-02-27 DIAGNOSIS — R42 DIZZINESS: Primary | ICD-10-CM

## 2025-02-27 DIAGNOSIS — R68.89 OTHER GENERAL SYMPTOMS AND SIGNS: ICD-10-CM

## 2025-02-27 DIAGNOSIS — N94.9 VAGINAL DISCOMFORT: ICD-10-CM

## 2025-02-27 NOTE — ASSESSMENT & PLAN NOTE
Since last Tuesday. No low blood pressures. No balance issues, just lightheaded. Will check for UTI.     Orders:    Culture, Urine; Future    AMB POC URINALYSIS DIP STICK AUTO W/O MICRO

## 2025-02-27 NOTE — PROGRESS NOTES
Reported on 2025)      Zinc Acetate, Oral, (ZINC ACETATE PO) Take 30 mg by mouth daily (Patient not taking: Reported on 2024)      vitamin D (CHOLECALCIFEROL) 25 MCG (1000 UT) TABS tablet Take 2 tablets by mouth daily Takes 2-3 tabs (Patient not taking: Reported on 2025)      diphenhydrAMINE (BENADRYL) 25 MG capsule Take 1 capsule by mouth every 6 hours as needed for Allergies (Patient not taking: Reported on 2025)      ketoconazole (NIZORAL) 2 % shampoo Use PRN (Patient not taking: Reported on 2024)      vitamin E 1000 units capsule Take 400 Units by mouth daily (Patient not taking: Reported on 2025)       No current facility-administered medications for this visit.       Social History     Tobacco Use    Smoking status: Former     Current packs/day: 0.00     Average packs/day: 2.0 packs/day for 15.0 years (30.0 ttl pk-yrs)     Types: Cigarettes     Start date: 1996     Quit date: 2011     Years since quittin.1    Smokeless tobacco: Never   Vaping Use    Vaping status: Never Used   Substance Use Topics    Alcohol use: No    Drug use: No       No data recorded    Health Maintenance Due   Topic Date Due    Shingles vaccine (1 of 2) Never done    DEXA (modify frequency per FRAX score)  Never done    Pneumococcal 50+ years Vaccine (2 of 2 - PCV) 2021    Respiratory Syncytial Virus (RSV) Pregnant or age 60 yrs+ (1 - 1-dose 75+ series) Never done    Flu vaccine (1) 2024    COVID-19 Vaccine (1 - - season) Never done        Recent Labs:   Lab Results   Component Value Date    WBC 8.4 10/20/2024    HGB 13.3 10/20/2024    HCT 38.0 10/20/2024     10/20/2024    CHOL 227 (H) 2023    TRIG 140 2023    HDL 52 2023    ALT 24 2024    AST 21 2024     10/20/2024    K 3.5 10/20/2024     10/20/2024    CREATININE 0.73 10/20/2024    BUN 13 10/20/2024    CO2 27 10/20/2024    TSH 1.660 2024    LABA1C 5.1 2023

## 2025-02-28 ENCOUNTER — LAB (OUTPATIENT)
Dept: FAMILY MEDICINE CLINIC | Facility: CLINIC | Age: 80
End: 2025-02-28

## 2025-02-28 DIAGNOSIS — N94.9 VAGINAL DISCOMFORT: ICD-10-CM

## 2025-02-28 DIAGNOSIS — R42 DIZZINESS: ICD-10-CM

## 2025-02-28 DIAGNOSIS — R68.89 OTHER GENERAL SYMPTOMS AND SIGNS: ICD-10-CM

## 2025-03-02 LAB
BACTERIA SPEC CULT: NORMAL
SERVICE CMNT-IMP: NORMAL

## 2025-03-03 LAB
BACTERIA SPEC CULT: NORMAL
SERVICE CMNT-IMP: NORMAL

## 2025-03-03 NOTE — PROGRESS NOTES
Density:    ROS:  Review of Systems  General: Not Present- Fatigue, Insomnia, Hot flashes/Night sweats, Weight gain, Brain fog  Breast: Not Present- Breast Mass, Breast Pain, Breast Swelling, Nipple Discharge, Nipple Pain, Recent Breast Size Changes and Skin Changes.  Gastrointestinal: Not Present- Abdominal Pain,  Bloating, Constipation, Diarrhea, Nausea, Rectal bleeding  Female Genitourinary: Not Present- Dysmenorrhea, Dyspareunia, Decreased libido, Excessive Menstrual Bleeding, Menstrual Irregularities, Pelvic Pain, Urinary Complaints, Vaginal Discharge, Vaginal itching/burning, Vaginal odor, Vaginal dryness  Positive for labial swelling  Psychiatric: Not Present- Anxiety, Depression, Mood changes and Panic Attacks.    PHYSICAL EXAM:    /74   Ht 1.499 m (4' 11\")   Wt 57.2 kg (126 lb)   BMI 25.45 kg/m²         General     General   Mental Status - Alert. General Appearance - Cooperative.     Breast   Left - Normal  Right - Normal           Female Genitourinary     External Genitalia   Vulva: Normal.   Perineum: Normal.   Bartholin's Gland:  Normal.   Clitoris :  Normal.   Introitus:  Normal.   Urethra:  Normal.     Speculum & Bimanual   Vagina: -  Normal.   Vaginal Lesions - None.   Cervix: Characteristics - Normal.   Uterus: Characteristics - Normal.   Adnexa: - Normal.   Bladder - Normal.       Medical problems and test results were reviewed with the patient today.     ASSESSMENT and PLAN    1. Swelling of vagina  2. Breast pain     Reassured her that there is no swelling and nothing of concern seen.          Time:  I spent  30 minutes in preparing to see patient (including chart review and preparation), obtaining and/or reviewing additional medical history, performing a physical exam and evaluation, documenting clinical information in the electronic health record, independently interpreting results, communicating results to patient, family or caregiver, and/or coordinating care.          No follow-ups

## 2025-03-04 ENCOUNTER — OFFICE VISIT (OUTPATIENT)
Dept: OBGYN CLINIC | Age: 80
End: 2025-03-04
Payer: MEDICARE

## 2025-03-04 VITALS
BODY MASS INDEX: 25.4 KG/M2 | DIASTOLIC BLOOD PRESSURE: 74 MMHG | HEIGHT: 59 IN | WEIGHT: 126 LBS | SYSTOLIC BLOOD PRESSURE: 124 MMHG

## 2025-03-04 DIAGNOSIS — N89.9 SWELLING OF VAGINA: Primary | ICD-10-CM

## 2025-03-04 DIAGNOSIS — N64.4 BREAST PAIN: ICD-10-CM

## 2025-03-04 PROCEDURE — 3074F SYST BP LT 130 MM HG: CPT | Performed by: OBSTETRICS & GYNECOLOGY

## 2025-03-04 PROCEDURE — 1036F TOBACCO NON-USER: CPT | Performed by: OBSTETRICS & GYNECOLOGY

## 2025-03-04 PROCEDURE — 99214 OFFICE O/P EST MOD 30 MIN: CPT | Performed by: OBSTETRICS & GYNECOLOGY

## 2025-03-04 PROCEDURE — 1123F ACP DISCUSS/DSCN MKR DOCD: CPT | Performed by: OBSTETRICS & GYNECOLOGY

## 2025-03-04 PROCEDURE — 99459 PELVIC EXAMINATION: CPT | Performed by: OBSTETRICS & GYNECOLOGY

## 2025-03-04 PROCEDURE — 1159F MED LIST DOCD IN RCRD: CPT | Performed by: OBSTETRICS & GYNECOLOGY

## 2025-03-04 PROCEDURE — G8427 DOCREV CUR MEDS BY ELIG CLIN: HCPCS | Performed by: OBSTETRICS & GYNECOLOGY

## 2025-03-04 PROCEDURE — 1160F RVW MEDS BY RX/DR IN RCRD: CPT | Performed by: OBSTETRICS & GYNECOLOGY

## 2025-03-04 PROCEDURE — 3078F DIAST BP <80 MM HG: CPT | Performed by: OBSTETRICS & GYNECOLOGY

## 2025-03-04 PROCEDURE — 1090F PRES/ABSN URINE INCON ASSESS: CPT | Performed by: OBSTETRICS & GYNECOLOGY

## 2025-03-04 PROCEDURE — G8400 PT W/DXA NO RESULTS DOC: HCPCS | Performed by: OBSTETRICS & GYNECOLOGY

## 2025-03-04 PROCEDURE — G8417 CALC BMI ABV UP PARAM F/U: HCPCS | Performed by: OBSTETRICS & GYNECOLOGY

## 2025-03-05 ENCOUNTER — APPOINTMENT (OUTPATIENT)
Dept: URBAN - METROPOLITAN AREA CLINIC 330 | Facility: CLINIC | Age: 80
Setting detail: DERMATOLOGY
End: 2025-03-05

## 2025-03-05 DIAGNOSIS — L30.4 ERYTHEMA INTERTRIGO: ICD-10-CM | Status: WELL CONTROLLED

## 2025-03-05 DIAGNOSIS — L30.9 DERMATITIS, UNSPECIFIED: ICD-10-CM | Status: INADEQUATELY CONTROLLED

## 2025-03-05 PROCEDURE — ? PRESCRIPTION

## 2025-03-05 PROCEDURE — ? PRESCRIPTION MEDICATION MANAGEMENT

## 2025-03-05 PROCEDURE — 99214 OFFICE O/P EST MOD 30 MIN: CPT

## 2025-03-05 PROCEDURE — ? COUNSELING

## 2025-03-05 PROCEDURE — ? COUNSELING: TOPICAL STEROIDS

## 2025-03-05 PROCEDURE — ? TREATMENT REGIMEN

## 2025-03-05 RX ORDER — DESONIDE 0.5 MG/G
CREAM TOPICAL
Qty: 60 | Refills: 2 | COMMUNITY
Start: 2025-03-05

## 2025-03-05 RX ADMIN — DESONIDE: 0.5 CREAM TOPICAL at 00:00

## 2025-03-05 ASSESSMENT — LOCATION DETAILED DESCRIPTION DERM
LOCATION DETAILED: LEFT INFERIOR LATERAL FOREHEAD
LOCATION DETAILED: LEFT RIB CAGE
LOCATION DETAILED: RIGHT INFERIOR LATERAL FOREHEAD
LOCATION DETAILED: RIGHT RIB CAGE
LOCATION DETAILED: LEFT SUPERIOR LATERAL MALAR CHEEK
LOCATION DETAILED: RIGHT INFERIOR TEMPLE

## 2025-03-05 ASSESSMENT — LOCATION SIMPLE DESCRIPTION DERM
LOCATION SIMPLE: LEFT CHEEK
LOCATION SIMPLE: RIGHT FOREHEAD
LOCATION SIMPLE: ABDOMEN
LOCATION SIMPLE: LEFT FOREHEAD
LOCATION SIMPLE: RIGHT TEMPLE

## 2025-03-05 ASSESSMENT — LOCATION ZONE DERM
LOCATION ZONE: TRUNK
LOCATION ZONE: FACE

## 2025-03-05 NOTE — PROCEDURE: PRESCRIPTION MEDICATION MANAGEMENT
Initiate Treatment: desonide 0.05 % topical cream Apply to AA BID for two weeks then weekends only as needed.
Render In Strict Bullet Format?: No
Detail Level: Zone
Plan: Appears to be allergic contact dermatitis. Discussed this with the patient and her brother.  Discussed appropriate use of topical steroids.  Both are in agreement with the plan today and will return in 1 month for evaluation.
This document is complete and the patient is ready for discharge.

## 2025-03-05 NOTE — HPI: RASH
What Type Of Note Output Would You Prefer (Optional)?: Bullet Format
Is This A New Presentation, Or A Follow-Up?: Rash
Additional History: Pt states she is here for rash on her face that started two days ago after wash in her hair. She states her only change has been vitamin A and notes she has used alcohol a few times to these lesions. Patient states she also has an area of concern under her breast she would like evaluated today. \\n\\nThe patient's brother is her caregiver and he brought her in today.  He thinks that the patient is having a reaction to something.  The patient is not totally clear on if she has used any new products that could have caused the rash on her cheek.

## 2025-03-06 ENCOUNTER — RX ONLY (RX ONLY)
Age: 80
End: 2025-03-06

## 2025-03-06 RX ORDER — DESONIDE 0.5 MG/G
CREAM TOPICAL
Qty: 60 | Refills: 0 | Status: ERX

## 2025-03-26 ENCOUNTER — APPOINTMENT (OUTPATIENT)
Dept: URBAN - METROPOLITAN AREA CLINIC 330 | Facility: CLINIC | Age: 80
Setting detail: DERMATOLOGY
End: 2025-03-26

## 2025-03-26 DIAGNOSIS — I83.9 ASYMPTOMATIC VARICOSE VEINS OF LOWER EXTREMITIES: ICD-10-CM | Status: INADEQUATELY CONTROLLED

## 2025-03-26 DIAGNOSIS — L82.0 INFLAMED SEBORRHEIC KERATOSIS: ICD-10-CM | Status: INADEQUATELY CONTROLLED

## 2025-03-26 DIAGNOSIS — L30.9 DERMATITIS, UNSPECIFIED: ICD-10-CM | Status: INADEQUATELY CONTROLLED

## 2025-03-26 PROBLEM — I83.91 ASYMPTOMATIC VARICOSE VEINS OF RIGHT LOWER EXTREMITY: Status: ACTIVE | Noted: 2025-03-26

## 2025-03-26 PROCEDURE — ? FULL BODY SKIN EXAM - DECLINED

## 2025-03-26 PROCEDURE — ? LIQUID NITROGEN

## 2025-03-26 PROCEDURE — ? ADDITIONAL NOTES

## 2025-03-26 PROCEDURE — ? PRESCRIPTION MEDICATION MANAGEMENT

## 2025-03-26 PROCEDURE — 17110 DESTRUCTION B9 LES UP TO 14: CPT

## 2025-03-26 PROCEDURE — ? COUNSELING

## 2025-03-26 PROCEDURE — ? COUNSELING: TOPICAL STEROIDS

## 2025-03-26 PROCEDURE — 99214 OFFICE O/P EST MOD 30 MIN: CPT | Mod: 25

## 2025-03-26 ASSESSMENT — LOCATION SIMPLE DESCRIPTION DERM
LOCATION SIMPLE: RIGHT TEMPLE
LOCATION SIMPLE: RIGHT THIGH
LOCATION SIMPLE: LEFT UPPER BACK
LOCATION SIMPLE: LEFT FOREHEAD
LOCATION SIMPLE: RIGHT FOREHEAD
LOCATION SIMPLE: LEFT CHEEK

## 2025-03-26 ASSESSMENT — LOCATION DETAILED DESCRIPTION DERM
LOCATION DETAILED: LEFT MEDIAL UPPER BACK
LOCATION DETAILED: LEFT SUPERIOR MEDIAL UPPER BACK
LOCATION DETAILED: RIGHT ANTERIOR PROXIMAL THIGH
LOCATION DETAILED: LEFT MID-UPPER BACK
LOCATION DETAILED: RIGHT INFERIOR TEMPLE
LOCATION DETAILED: LEFT INFERIOR LATERAL FOREHEAD
LOCATION DETAILED: RIGHT INFERIOR LATERAL FOREHEAD
LOCATION DETAILED: LEFT SUPERIOR LATERAL MALAR CHEEK

## 2025-03-26 ASSESSMENT — LOCATION ZONE DERM
LOCATION ZONE: TRUNK
LOCATION ZONE: LEG
LOCATION ZONE: FACE

## 2025-03-26 NOTE — PROCEDURE: ADDITIONAL NOTES
Additional Notes: Pt states she will see a vein specialist.  She states she would like to talk to her PCP for this.
Detail Level: Simple
Render Risk Assessment In Note?: no

## 2025-03-26 NOTE — PROCEDURE: PRESCRIPTION MEDICATION MANAGEMENT
Continue Regimen: desonide 0.05 % topical cream
Render In Strict Bullet Format?: No
Detail Level: Zone
Plan: Patient to continue Desonide only 2 days of the week.

## 2025-03-26 NOTE — PROCEDURE: FULL BODY SKIN EXAM - DECLINED
----- Message from Lulu Arnold sent at 2018  9:54 AM CDT -----  Please work in patient to get tested for rv..[phone]    
Detail Level: Simple
Instructions: This plan will send the code FBSD to the PM system.  DO NOT or CHANGE the price.
Price (Do Not Change): 0.00

## 2025-04-14 ENCOUNTER — TELEPHONE (OUTPATIENT)
Dept: BEHAVIORAL/MENTAL HEALTH CLINIC | Age: 80
End: 2025-04-14

## 2025-04-14 ENCOUNTER — TELEPHONE (OUTPATIENT)
Dept: FAMILY MEDICINE CLINIC | Facility: CLINIC | Age: 80
End: 2025-04-14

## 2025-04-14 NOTE — TELEPHONE ENCOUNTER
Patient called in and wants to go to another pulmonologist Dr. Benjamin Alfred but she needs another referral to see this doctor. She wants to know what she needs to do to get this taken care of as soon as possible. Please give her a call at [phone].

## 2025-04-14 NOTE — TELEPHONE ENCOUNTER
Please schedule for an appointment to discuss.  Insurance will not cover unless we document carefully the need, the diagnosis etc.

## 2025-04-16 NOTE — PROGRESS NOTES
Meredith Toribio (: 1945) is a 79 y.o. female, established patient, here for evaluation of the following chief complaint(s):  Other (Would like a referral to a new pulmonologist. Had a reaction to Adviar which was prescribed by Dr. Souza. Has sleep apnea and feels like past pulmonology was not treating accurately. Would like referral to Benjamin Alfred )       ASSESSMENT/PLAN:  1. NICOLE (obstructive sleep apnea)  2. Mild intermittent asthma without complication  3. Multinodular goiter  -     TSH reflex to FT4; Future  4. Right thyroid nodule  -     TSH reflex to FT4; Future  5. Essential (primary) hypertension  -     CBC with Auto Differential; Future  -     Comprehensive Metabolic Panel; Future  6. Dyslipidemia  -     Lipid Panel; Future  7. Screening for diabetes mellitus  -     Hemoglobin A1C; Future    She will check with [facility] first before referral to [facility] groups for NICOLE/asthma mgt.     Offered Endocrine referral, declined. She declined an US order today for new onset right neck pain with palpation along her right thyroid lobe. She is agreeable to US later this year.     6 month F/U with labs one week prior to visit.     SUBJECTIVE/OBJECTIVE:  HPI    Patient is a new patient to myself, previously managed by Dr. Perkins.     Her last visit was on 25. Review of note indicates a complaint of dizziness for which a UA was checked for an infectious source. BP was 130/80. I do not see available results for a UA however urine culture was negative. She had also endorsed a nodule to her vaginal region that would be followed by her GYN on an upcoming visit.     She was seen on 25, there were multiple chronic issues addressed. There is a diagnosis for moderate dementia noted; per note, she was using her CPAP routinely and noted to be less confused at this visit. Review of chart shows a Neurology visit with [facility] on 24. Per note, fluctuating memory loss noted, she

## 2025-04-17 ENCOUNTER — OFFICE VISIT (OUTPATIENT)
Dept: FAMILY MEDICINE CLINIC | Facility: CLINIC | Age: 80
End: 2025-04-17

## 2025-04-17 VITALS
OXYGEN SATURATION: 98 % | WEIGHT: 126.13 LBS | HEIGHT: 59 IN | DIASTOLIC BLOOD PRESSURE: 86 MMHG | TEMPERATURE: 98.6 F | BODY MASS INDEX: 25.43 KG/M2 | SYSTOLIC BLOOD PRESSURE: 134 MMHG | HEART RATE: 80 BPM

## 2025-04-17 DIAGNOSIS — Z13.1 SCREENING FOR DIABETES MELLITUS: ICD-10-CM

## 2025-04-17 DIAGNOSIS — J45.20 MILD INTERMITTENT ASTHMA WITHOUT COMPLICATION: ICD-10-CM

## 2025-04-17 DIAGNOSIS — E78.5 DYSLIPIDEMIA: ICD-10-CM

## 2025-04-17 DIAGNOSIS — E04.2 MULTINODULAR GOITER: ICD-10-CM

## 2025-04-17 DIAGNOSIS — G47.33 OSA (OBSTRUCTIVE SLEEP APNEA): Primary | ICD-10-CM

## 2025-04-17 DIAGNOSIS — E04.1 RIGHT THYROID NODULE: ICD-10-CM

## 2025-04-17 DIAGNOSIS — I10 ESSENTIAL (PRIMARY) HYPERTENSION: ICD-10-CM

## 2025-04-17 NOTE — PATIENT INSTRUCTIONS
Please check with Dr. Littlejohn with Pulmonology. If you are unable to see him, please let me know so we can refer you to [facility].

## 2025-04-25 ENCOUNTER — OFFICE VISIT (OUTPATIENT)
Dept: BEHAVIORAL/MENTAL HEALTH CLINIC | Age: 80
End: 2025-04-25
Payer: MEDICARE

## 2025-04-25 VITALS
HEART RATE: 79 BPM | BODY MASS INDEX: 25.8 KG/M2 | OXYGEN SATURATION: 97 % | DIASTOLIC BLOOD PRESSURE: 82 MMHG | HEIGHT: 59 IN | SYSTOLIC BLOOD PRESSURE: 136 MMHG | WEIGHT: 128 LBS | TEMPERATURE: 98 F

## 2025-04-25 DIAGNOSIS — F43.10 PTSD (POST-TRAUMATIC STRESS DISORDER): ICD-10-CM

## 2025-04-25 DIAGNOSIS — F51.05 INSOMNIA DUE TO MENTAL DISORDER: ICD-10-CM

## 2025-04-25 DIAGNOSIS — F31.75 BIPOLAR DISORDER, IN PARTIAL REMISSION, MOST RECENT EPISODE DEPRESSED (HCC): Primary | ICD-10-CM

## 2025-04-25 DIAGNOSIS — F41.1 GENERALIZED ANXIETY DISORDER: ICD-10-CM

## 2025-04-25 PROCEDURE — 99214 OFFICE O/P EST MOD 30 MIN: CPT | Performed by: PSYCHIATRY & NEUROLOGY

## 2025-04-25 PROCEDURE — G8427 DOCREV CUR MEDS BY ELIG CLIN: HCPCS | Performed by: PSYCHIATRY & NEUROLOGY

## 2025-04-25 PROCEDURE — 1090F PRES/ABSN URINE INCON ASSESS: CPT | Performed by: PSYCHIATRY & NEUROLOGY

## 2025-04-25 PROCEDURE — 1123F ACP DISCUSS/DSCN MKR DOCD: CPT | Performed by: PSYCHIATRY & NEUROLOGY

## 2025-04-25 PROCEDURE — 3075F SYST BP GE 130 - 139MM HG: CPT | Performed by: PSYCHIATRY & NEUROLOGY

## 2025-04-25 PROCEDURE — G8400 PT W/DXA NO RESULTS DOC: HCPCS | Performed by: PSYCHIATRY & NEUROLOGY

## 2025-04-25 PROCEDURE — 3079F DIAST BP 80-89 MM HG: CPT | Performed by: PSYCHIATRY & NEUROLOGY

## 2025-04-25 PROCEDURE — 1159F MED LIST DOCD IN RCRD: CPT | Performed by: PSYCHIATRY & NEUROLOGY

## 2025-04-25 PROCEDURE — 1036F TOBACCO NON-USER: CPT | Performed by: PSYCHIATRY & NEUROLOGY

## 2025-04-25 PROCEDURE — G8417 CALC BMI ABV UP PARAM F/U: HCPCS | Performed by: PSYCHIATRY & NEUROLOGY

## 2025-04-25 RX ORDER — LAMOTRIGINE 25 MG/1
TABLET ORAL
Qty: 60 TABLET | Refills: 3 | Status: CANCELLED | OUTPATIENT
Start: 2025-04-25

## 2025-04-25 RX ORDER — OLANZAPINE 5 MG/1
TABLET ORAL
Qty: 120 TABLET | Refills: 3 | Status: SHIPPED | OUTPATIENT
Start: 2025-04-25

## 2025-04-25 ASSESSMENT — ANXIETY QUESTIONNAIRES
IF YOU CHECKED OFF ANY PROBLEMS ON THIS QUESTIONNAIRE, HOW DIFFICULT HAVE THESE PROBLEMS MADE IT FOR YOU TO DO YOUR WORK, TAKE CARE OF THINGS AT HOME, OR GET ALONG WITH OTHER PEOPLE: NOT DIFFICULT AT ALL
2. NOT BEING ABLE TO STOP OR CONTROL WORRYING: SEVERAL DAYS
GAD7 TOTAL SCORE: 6
5. BEING SO RESTLESS THAT IT IS HARD TO SIT STILL: NOT AT ALL
3. WORRYING TOO MUCH ABOUT DIFFERENT THINGS: SEVERAL DAYS
7. FEELING AFRAID AS IF SOMETHING AWFUL MIGHT HAPPEN: SEVERAL DAYS
1. FEELING NERVOUS, ANXIOUS, OR ON EDGE: SEVERAL DAYS
6. BECOMING EASILY ANNOYED OR IRRITABLE: SEVERAL DAYS
4. TROUBLE RELAXING: SEVERAL DAYS

## 2025-04-25 ASSESSMENT — PATIENT HEALTH QUESTIONNAIRE - PHQ9
SUM OF ALL RESPONSES TO PHQ QUESTIONS 1-9: 7
8. MOVING OR SPEAKING SO SLOWLY THAT OTHER PEOPLE COULD HAVE NOTICED. OR THE OPPOSITE, BEING SO FIGETY OR RESTLESS THAT YOU HAVE BEEN MOVING AROUND A LOT MORE THAN USUAL: SEVERAL DAYS
SUM OF ALL RESPONSES TO PHQ QUESTIONS 1-9: 7
2. FEELING DOWN, DEPRESSED OR HOPELESS: SEVERAL DAYS
7. TROUBLE CONCENTRATING ON THINGS, SUCH AS READING THE NEWSPAPER OR WATCHING TELEVISION: SEVERAL DAYS
3. TROUBLE FALLING OR STAYING ASLEEP: SEVERAL DAYS
1. LITTLE INTEREST OR PLEASURE IN DOING THINGS: SEVERAL DAYS
SUM OF ALL RESPONSES TO PHQ QUESTIONS 1-9: 7
6. FEELING BAD ABOUT YOURSELF - OR THAT YOU ARE A FAILURE OR HAVE LET YOURSELF OR YOUR FAMILY DOWN: SEVERAL DAYS
9. THOUGHTS THAT YOU WOULD BE BETTER OFF DEAD, OR OF HURTING YOURSELF: NOT AT ALL
SUM OF ALL RESPONSES TO PHQ QUESTIONS 1-9: 7
4. FEELING TIRED OR HAVING LITTLE ENERGY: SEVERAL DAYS
10. IF YOU CHECKED OFF ANY PROBLEMS, HOW DIFFICULT HAVE THESE PROBLEMS MADE IT FOR YOU TO DO YOUR WORK, TAKE CARE OF THINGS AT HOME, OR GET ALONG WITH OTHER PEOPLE: SOMEWHAT DIFFICULT
5. POOR APPETITE OR OVEREATING: NOT AT ALL

## 2025-04-25 NOTE — PROGRESS NOTES
Patient:  Meredith Toribio  Age:  79 y.o.  :  1945     SEX:  female MRN:  [id number]     RACE: White (non-)     SEEN:  [x]  PATIENT  []  SPOUSE [x]  OTHER:    Brother              2025    11:42 AM 2025    11:41 AM 2025     2:19 PM   PHQ-9    Depression Unable to Assess   Functional capacity motivation limits accuracy   Little interest or pleasure in doing things 1 0    Feeling down, depressed, or hopeless 1 1    Trouble falling or staying asleep, or sleeping too much 1 2    Feeling tired or having little energy 1 2    Poor appetite or overeating 0 3    Feeling bad about yourself - or that you are a failure or have let yourself or your family down 1 2    Trouble concentrating on things, such as reading the newspaper or watching television 1 1    Moving or speaking so slowly that other people could have noticed. Or the opposite - being so fidgety or restless that you have been moving around a lot more than usual 1 2    Thoughts that you would be better off dead, or of hurting yourself in some way 0 0    PHQ-2 Score 2 1    PHQ-9 Total Score 7 13    If you checked off any problems, how difficult have these problems made it for you to do your work, take care of things at home, or get along with other people? 1 2            2025    11:44 AM 2025    11:43 AM 2024     3:19 PM   LING-7 SCREENING   Feeling nervous, anxious, or on edge Several days Nearly every day Several days   Not being able to stop or control worrying Several days More than half the days Nearly every day   Worrying too much about different things Several days More than half the days Nearly every day   Trouble relaxing Several days More than half the days Not at all   Being so restless that it is hard to sit still Not at all Not at all Not at all   Becoming easily annoyed or irritable Several days Nearly every day Several days   Feeling afraid as if something awful might happen Several days More than

## 2025-06-05 ENCOUNTER — APPOINTMENT (OUTPATIENT)
Dept: URBAN - METROPOLITAN AREA CLINIC 330 | Facility: CLINIC | Age: 80
Setting detail: DERMATOLOGY
End: 2025-06-05

## 2025-06-05 DIAGNOSIS — L21.8 OTHER SEBORRHEIC DERMATITIS: ICD-10-CM

## 2025-06-05 PROCEDURE — ? PRESCRIPTION MEDICATION MANAGEMENT

## 2025-06-05 PROCEDURE — ? PRESCRIPTION

## 2025-06-05 PROCEDURE — ? PHOTO-DOCUMENTATION

## 2025-06-05 PROCEDURE — ? FULL BODY SKIN EXAM - DECLINED

## 2025-06-05 PROCEDURE — ? COUNSELING: TOPICAL STEROIDS

## 2025-06-05 PROCEDURE — ? COUNSELING

## 2025-06-05 RX ORDER — ROFLUMILAST 3 MG/G
AEROSOL, FOAM TOPICAL
Qty: 60 | Refills: 3 | Status: ERX | COMMUNITY
Start: 2025-06-05

## 2025-06-05 RX ADMIN — ROFLUMILAST: 3 AEROSOL, FOAM TOPICAL at 00:00

## 2025-06-05 ASSESSMENT — LOCATION DETAILED DESCRIPTION DERM
LOCATION DETAILED: MEDIAL FRONTAL SCALP
LOCATION DETAILED: RIGHT INFERIOR TEMPLE
LOCATION DETAILED: LEFT SUPERIOR LATERAL MALAR CHEEK
LOCATION DETAILED: LEFT INFERIOR LATERAL FOREHEAD
LOCATION DETAILED: RIGHT INFERIOR LATERAL FOREHEAD

## 2025-06-05 ASSESSMENT — SEVERITY ASSESSMENT: HOW SEVERE IS THIS PATIENT'S CONDITION?: MILD

## 2025-06-05 ASSESSMENT — LOCATION SIMPLE DESCRIPTION DERM
LOCATION SIMPLE: LEFT CHEEK
LOCATION SIMPLE: RIGHT FOREHEAD
LOCATION SIMPLE: LEFT FOREHEAD
LOCATION SIMPLE: FRONTAL SCALP
LOCATION SIMPLE: RIGHT TEMPLE

## 2025-06-05 ASSESSMENT — LOCATION ZONE DERM
LOCATION ZONE: SCALP
LOCATION ZONE: FACE

## 2025-06-05 NOTE — PROCEDURE: PRESCRIPTION MEDICATION MANAGEMENT
Continue Regimen: desonide 0.05 % topical cream - bid two days a week PRN
Samples Given: Zoryve 3%
Initiate Treatment: Zoryve 3% foam - apply to scalp and face QD PRN
Render In Strict Bullet Format?: No
Detail Level: Zone
Plan: Discussed starting Zoryve. Pt agreed. \\n\\nExplained instructions on how and when to apply medicine in great detail and wrote them down for pt to bring home. Explained how the process for receiving the medication from [facility] works and wrote it down for pt. Gave office phone number to pt as well.

## 2025-06-12 ENCOUNTER — OFFICE VISIT (OUTPATIENT)
Dept: FAMILY MEDICINE CLINIC | Facility: CLINIC | Age: 80
End: 2025-06-12

## 2025-06-12 VITALS
TEMPERATURE: 98.2 F | BODY MASS INDEX: 25.4 KG/M2 | DIASTOLIC BLOOD PRESSURE: 78 MMHG | OXYGEN SATURATION: 98 % | WEIGHT: 126 LBS | HEIGHT: 59 IN | SYSTOLIC BLOOD PRESSURE: 130 MMHG | HEART RATE: 84 BPM

## 2025-06-12 DIAGNOSIS — R30.0 DYSURIA: ICD-10-CM

## 2025-06-12 DIAGNOSIS — R30.0 DYSURIA: Primary | ICD-10-CM

## 2025-06-12 LAB
BILIRUBIN, URINE, POC: NEGATIVE
BLOOD URINE, POC: NEGATIVE
GLUCOSE URINE, POC: NEGATIVE MG/DL
KETONES, URINE, POC: NEGATIVE MG/DL
LEUKOCYTE ESTERASE, URINE, POC: NEGATIVE
NITRITE, URINE, POC: NEGATIVE
PH, URINE, POC: 7 (ref 4.6–8)
PROTEIN,URINE, POC: NEGATIVE MG/DL
SPECIFIC GRAVITY, URINE, POC: 1.01 (ref 1–1.03)
URINALYSIS CLARITY, POC: CLEAR
URINALYSIS COLOR, POC: YELLOW
UROBILINOGEN, POC: NORMAL MG/DL

## 2025-06-12 NOTE — PROGRESS NOTES
Meredith Toribio (: 1945) is a 79 y.o. female, established patient, here for evaluation of the following chief complaint(s):  Surgical Clearance (Was told my pulmonology she needed to be tested for a UTI due to temp of 99.1. Has burning, urinary frequency, cloudy urine. )       ASSESSMENT/PLAN:  1. Dysuria  -     AMB POC URINALYSIS DIP STICK AUTO W/O MICRO  -     Culture, Urine; Future    UA negative. We discussed several different abx options. She is hesitant to start as she is fearful she may have a reaction. We discussed her updated allergy list. She would like to hold abx therapy until culture results.     SUBJECTIVE/OBJECTIVE:  HPI    Dysuria:     Endorses dysuria, frequency for several days. UA was negative.     Vitals:    25 1337   BP: 130/78   Pulse: 84   Temp: 98.2 °F (36.8 °C)   SpO2: 98%     Results for orders placed or performed in visit on 25   AMB POC URINALYSIS DIP STICK AUTO W/O MICRO   Result Value Ref Range    Color (UA POC) Yellow     Clarity (UA POC) Clear     Glucose, Urine, POC Negative Negative mg/dL    Bilirubin, Urine, POC Negative Negative    KETONES, Urine, POC Negative Negative mg/dL    Specific Gravity, Urine, POC 1.010 1.001 - 1.035    Blood (UA POC) Negative     pH, Urine, POC 7.0 4.6 - 8.0    Protein, Urine, POC Negative Negative mg/dL    Urobilinogen, POC 0.2 mg/dL <1.1 mg/dL    Nitrite, Urine, POC Negative Negative    Leukocyte Esterase, Urine, POC Negative Negative         Physical Exam  Pulmonary:      Effort: Pulmonary effort is normal.   Skin:     General: Skin is warm and dry.   Neurological:      Mental Status: She is alert. Mental status is at baseline.         An electronic signature was used to authenticate this note.  -- KEM Freitas - CNP

## 2025-06-14 LAB
BACTERIA SPEC CULT: NORMAL
SERVICE CMNT-IMP: NORMAL

## 2025-06-16 ENCOUNTER — RESULTS FOLLOW-UP (OUTPATIENT)
Dept: FAMILY MEDICINE CLINIC | Facility: CLINIC | Age: 80
End: 2025-06-16

## 2025-06-16 NOTE — RESULT ENCOUNTER NOTE
Please call and let patient know that her culture was negative for bacteria. I would be sure to hydrate well with water. Limit bladder irritants such as teas, coffees, or sodas as this can cause frequency and a discomfort when urinating.

## 2025-07-01 ENCOUNTER — OFFICE VISIT (OUTPATIENT)
Dept: FAMILY MEDICINE CLINIC | Facility: CLINIC | Age: 80
End: 2025-07-01

## 2025-07-01 VITALS
SYSTOLIC BLOOD PRESSURE: 142 MMHG | DIASTOLIC BLOOD PRESSURE: 76 MMHG | WEIGHT: 125 LBS | HEART RATE: 104 BPM | HEIGHT: 59 IN | BODY MASS INDEX: 25.2 KG/M2 | TEMPERATURE: 98 F | OXYGEN SATURATION: 96 %

## 2025-07-01 DIAGNOSIS — R26.81 GAIT INSTABILITY: ICD-10-CM

## 2025-07-01 DIAGNOSIS — M25.511 ACUTE PAIN OF RIGHT SHOULDER: Primary | ICD-10-CM

## 2025-07-01 DIAGNOSIS — M19.011 PRIMARY OSTEOARTHRITIS OF RIGHT SHOULDER: ICD-10-CM

## 2025-07-01 PROBLEM — R06.02 SOB (SHORTNESS OF BREATH): Status: RESOLVED | Noted: 2021-05-06 | Resolved: 2025-07-01

## 2025-07-01 PROBLEM — R09.81 NASAL CONGESTION: Status: RESOLVED | Noted: 2018-09-20 | Resolved: 2025-07-01

## 2025-07-01 PROBLEM — R07.0 PAIN IN THROAT: Status: RESOLVED | Noted: 2024-04-18 | Resolved: 2025-07-01

## 2025-07-01 RX ORDER — CELECOXIB 100 MG/1
100 CAPSULE ORAL 2 TIMES DAILY
COMMUNITY
Start: 2025-06-27

## 2025-07-01 NOTE — PROGRESS NOTES
Meredith Toribio (: 1945) is a 79 y.o. female, established patient, here for evaluation of the following chief complaint(s):  Shoulder Pain       ASSESSMENT/PLAN:  1. Acute pain of right shoulder  -     External Referral to Home Health  2. Primary osteoarthritis of right shoulder  3. Gait instability  -     External Referral to Home Health    PT ordered via HH.   Continue Celebrex ordered by Ortho. Can utilize icy hot patches, lidocaine patches.   Discussed importance of ROM exercises for shoulder to prevent frozen shoulder.     SUBJECTIVE/OBJECTIVE:  Shoulder Pain         Shoulder pain:     Endorses right anterior shoulder pain for at least the last week. Relays it feels \"hard to move\". Does endorse minimizing range of motion. No known injury or fall per patient. Review of her last imaging shows degenerative changes in her right shoulder.     Her brother is with her today. They are both in agreement she would benefit from PT as he relayed he had to help her 4-5 times to stand up. They would like this completed via HH. Will refer today.     Vitals:    25 1639 25 1658   BP: (!) 142/76    Pulse: (!) 110 (!) 104   Temp: 98 °F (36.7 °C)    SpO2: 96%    Weight: 56.7 kg (125 lb)    Height: 1.499 m (4' 11\")         Physical Exam  Musculoskeletal:      Right shoulder: Tenderness present. No swelling, deformity or crepitus. Decreased range of motion. Normal strength.      Comments: Endorsed pain when moving arm out in front of body. She wanted me to help her apply an icyhot patch to her shoulder in the exam room. Strength equal bilaterally.          On this date 25  I have spent 20 minutes reviewing previous notes, test results and face to face with the patient discussing the diagnosis and importance of compliance with the treatment plan as well as documenting on the day of the visit.      An electronic signature was used to authenticate this note.  -- KEM Freitas - CNP

## 2025-07-08 ENCOUNTER — TELEPHONE (OUTPATIENT)
Dept: FAMILY MEDICINE CLINIC | Facility: CLINIC | Age: 80
End: 2025-07-08

## 2025-07-08 ENCOUNTER — APPOINTMENT (OUTPATIENT)
Dept: MRI IMAGING | Age: 80
End: 2025-07-08
Payer: MEDICARE

## 2025-07-08 ENCOUNTER — APPOINTMENT (OUTPATIENT)
Dept: CT IMAGING | Age: 80
End: 2025-07-08
Payer: MEDICARE

## 2025-07-08 ENCOUNTER — TELEPHONE (OUTPATIENT)
Dept: BEHAVIORAL/MENTAL HEALTH CLINIC | Age: 80
End: 2025-07-08

## 2025-07-08 ENCOUNTER — HOSPITAL ENCOUNTER (OUTPATIENT)
Age: 80
Setting detail: OBSERVATION
Discharge: HOME | End: 2025-07-09
Attending: STUDENT IN AN ORGANIZED HEALTH CARE EDUCATION/TRAINING PROGRAM | Admitting: HOSPITALIST
Payer: MEDICARE

## 2025-07-08 DIAGNOSIS — R47.9 SPEECH DISTURBANCE, UNSPECIFIED TYPE: Primary | ICD-10-CM

## 2025-07-08 DIAGNOSIS — Z86.59 HISTORY OF BIPOLAR DISORDER: ICD-10-CM

## 2025-07-08 DIAGNOSIS — I63.9 CEREBROVASCULAR ACCIDENT (CVA), UNSPECIFIED MECHANISM (HCC): ICD-10-CM

## 2025-07-08 DIAGNOSIS — R41.82 ALTERED MENTAL STATUS, UNSPECIFIED ALTERED MENTAL STATUS TYPE: ICD-10-CM

## 2025-07-08 PROBLEM — R47.01 EXPRESSIVE APHASIA: Status: ACTIVE | Noted: 2025-07-08

## 2025-07-08 LAB
ALBUMIN SERPL-MCNC: 3.8 G/DL (ref 3.2–4.6)
ALBUMIN/GLOB SERPL: 1.4 (ref 1–1.9)
ALP SERPL-CCNC: 121 U/L (ref 35–104)
ALT SERPL-CCNC: 16 U/L (ref 8–45)
AMMONIA PLAS-SCNC: 20 UMOL/L (ref 11–51)
AMPHET UR QL SCN: NEGATIVE
ANION GAP SERPL CALC-SCNC: 12 MMOL/L (ref 7–16)
APPEARANCE UR: CLEAR
AST SERPL-CCNC: 21 U/L (ref 15–37)
BACTERIA URNS QL MICRO: NEGATIVE /HPF
BARBITURATES UR QL SCN: NEGATIVE
BASOPHILS # BLD: 0.02 K/UL (ref 0–0.2)
BASOPHILS NFR BLD: 0.3 % (ref 0–2)
BENZODIAZ UR QL: NEGATIVE
BILIRUB SERPL-MCNC: 0.7 MG/DL (ref 0–1.2)
BILIRUB UR QL: NEGATIVE
BUN SERPL-MCNC: 11 MG/DL (ref 8–23)
CALCIUM SERPL-MCNC: 10 MG/DL (ref 8.8–10.2)
CANNABINOIDS UR QL SCN: NEGATIVE
CASTS URNS QL MICRO: 0 /LPF
CHLORIDE SERPL-SCNC: 102 MMOL/L (ref 98–107)
CO2 SERPL-SCNC: 23 MMOL/L (ref 20–29)
COCAINE UR QL SCN: NEGATIVE
COLOR UR: NORMAL
CREAT SERPL-MCNC: 0.61 MG/DL (ref 0.6–1.1)
CRYSTALS URNS QL MICRO: 0 /LPF
DIFFERENTIAL METHOD BLD: ABNORMAL
EOSINOPHIL # BLD: 0.06 K/UL (ref 0–0.8)
EOSINOPHIL NFR BLD: 0.8 % (ref 0.5–7.8)
EPI CELLS #/AREA URNS HPF: NORMAL /HPF
ERYTHROCYTE [DISTWIDTH] IN BLOOD BY AUTOMATED COUNT: 11.8 % (ref 11.9–14.6)
GLOBULIN SER CALC-MCNC: 2.7 G/DL (ref 2.3–3.5)
GLUCOSE SERPL-MCNC: 92 MG/DL (ref 70–99)
GLUCOSE UR STRIP.AUTO-MCNC: NEGATIVE MG/DL
HCT VFR BLD AUTO: 36.2 % (ref 35.8–46.3)
HGB BLD-MCNC: 12.8 G/DL (ref 11.7–15.4)
HGB UR QL STRIP: NEGATIVE
HYALINE CASTS URNS QL MICRO: NORMAL /LPF
IMM GRANULOCYTES # BLD AUTO: 0.02 K/UL (ref 0–0.5)
IMM GRANULOCYTES NFR BLD AUTO: 0.3 % (ref 0–5)
KETONES UR QL STRIP.AUTO: NEGATIVE MG/DL
LEUKOCYTE ESTERASE UR QL STRIP.AUTO: NEGATIVE
LYMPHOCYTES # BLD: 1.47 K/UL (ref 0.5–4.6)
LYMPHOCYTES NFR BLD: 19.1 % (ref 13–44)
Lab: ABNORMAL
MCH RBC QN AUTO: 31.5 PG (ref 26.1–32.9)
MCHC RBC AUTO-ENTMCNC: 35.4 G/DL (ref 31.4–35)
MCV RBC AUTO: 89.2 FL (ref 82–102)
METHADONE UR QL: NEGATIVE
MONOCYTES # BLD: 0.5 K/UL (ref 0.1–1.3)
MONOCYTES NFR BLD: 6.5 % (ref 4–12)
MUCOUS THREADS URNS QL MICRO: 0 /LPF
NEUTS SEG # BLD: 5.63 K/UL (ref 1.7–8.2)
NEUTS SEG NFR BLD: 73 % (ref 43–78)
NITRITE UR QL STRIP.AUTO: NEGATIVE
NRBC # BLD: 0 K/UL (ref 0–0.2)
OPIATES UR QL: POSITIVE
PCP UR QL: NEGATIVE
PH UR STRIP: 7.5 (ref 5–9)
PLATELET # BLD AUTO: 172 K/UL (ref 150–450)
PMV BLD AUTO: 7.9 FL (ref 9.4–12.3)
POTASSIUM SERPL-SCNC: 3.8 MMOL/L (ref 3.5–5.1)
PROCALCITONIN SERPL-MCNC: 0.06 NG/ML (ref 0–0.1)
PROT SERPL-MCNC: 6.6 G/DL (ref 6.3–8.2)
PROT UR STRIP-MCNC: NEGATIVE MG/DL
RBC # BLD AUTO: 4.06 M/UL (ref 4.05–5.2)
RBC #/AREA URNS HPF: NORMAL /HPF
SODIUM SERPL-SCNC: 137 MMOL/L (ref 136–145)
SP GR UR REFRACTOMETRY: 1 (ref 1–1.02)
TSH W FREE THYROID IF ABNORMAL: 1.7 UIU/ML (ref 0.27–4.2)
URINE CULTURE IF INDICATED: NORMAL
UROBILINOGEN UR QL STRIP.AUTO: 0.2 EU/DL (ref 0.2–1)
WBC # BLD AUTO: 7.7 K/UL (ref 4.3–11.1)
WBC URNS QL MICRO: NORMAL /HPF

## 2025-07-08 PROCEDURE — 84443 ASSAY THYROID STIM HORMONE: CPT

## 2025-07-08 PROCEDURE — 70551 MRI BRAIN STEM W/O DYE: CPT

## 2025-07-08 PROCEDURE — 81001 URINALYSIS AUTO W/SCOPE: CPT

## 2025-07-08 PROCEDURE — 96372 THER/PROPH/DIAG INJ SC/IM: CPT

## 2025-07-08 PROCEDURE — 6360000002 HC RX W HCPCS: Performed by: HOSPITALIST

## 2025-07-08 PROCEDURE — G0378 HOSPITAL OBSERVATION PER HR: HCPCS

## 2025-07-08 PROCEDURE — 80307 DRUG TEST PRSMV CHEM ANLYZR: CPT

## 2025-07-08 PROCEDURE — 99285 EMERGENCY DEPT VISIT HI MDM: CPT

## 2025-07-08 PROCEDURE — 2500000003 HC RX 250 WO HCPCS: Performed by: HOSPITALIST

## 2025-07-08 PROCEDURE — 80053 COMPREHEN METABOLIC PANEL: CPT

## 2025-07-08 PROCEDURE — 6370000000 HC RX 637 (ALT 250 FOR IP): Performed by: HOSPITALIST

## 2025-07-08 PROCEDURE — 84145 PROCALCITONIN (PCT): CPT

## 2025-07-08 PROCEDURE — 82140 ASSAY OF AMMONIA: CPT

## 2025-07-08 PROCEDURE — 70450 CT HEAD/BRAIN W/O DYE: CPT

## 2025-07-08 PROCEDURE — 85025 COMPLETE CBC W/AUTO DIFF WBC: CPT

## 2025-07-08 RX ORDER — MECLIZINE HYDROCHLORIDE 25 MG/1
12.5 TABLET ORAL 3 TIMES DAILY PRN
Status: DISCONTINUED | OUTPATIENT
Start: 2025-07-08 | End: 2025-07-09 | Stop reason: HOSPADM

## 2025-07-08 RX ORDER — POLYETHYLENE GLYCOL 3350 17 G/17G
17 POWDER, FOR SOLUTION ORAL DAILY PRN
Status: DISCONTINUED | OUTPATIENT
Start: 2025-07-08 | End: 2025-07-09 | Stop reason: HOSPADM

## 2025-07-08 RX ORDER — ASPIRIN 300 MG/1
300 SUPPOSITORY RECTAL DAILY
Status: DISCONTINUED | OUTPATIENT
Start: 2025-07-08 | End: 2025-07-09 | Stop reason: HOSPADM

## 2025-07-08 RX ORDER — ATORVASTATIN CALCIUM 40 MG/1
80 TABLET, FILM COATED ORAL NIGHTLY
Status: DISCONTINUED | OUTPATIENT
Start: 2025-07-08 | End: 2025-07-09 | Stop reason: HOSPADM

## 2025-07-08 RX ORDER — SODIUM CHLORIDE 9 MG/ML
INJECTION, SOLUTION INTRAVENOUS PRN
Status: DISCONTINUED | OUTPATIENT
Start: 2025-07-08 | End: 2025-07-09 | Stop reason: HOSPADM

## 2025-07-08 RX ORDER — SODIUM CHLORIDE 0.9 % (FLUSH) 0.9 %
5-40 SYRINGE (ML) INJECTION PRN
Status: DISCONTINUED | OUTPATIENT
Start: 2025-07-08 | End: 2025-07-09 | Stop reason: HOSPADM

## 2025-07-08 RX ORDER — OLANZAPINE 2.5 MG/1
5 TABLET, FILM COATED ORAL EVERY MORNING
Status: DISCONTINUED | OUTPATIENT
Start: 2025-07-09 | End: 2025-07-09

## 2025-07-08 RX ORDER — DIPHENHYDRAMINE HCL 25 MG
25 CAPSULE ORAL EVERY 6 HOURS PRN
Status: DISCONTINUED | OUTPATIENT
Start: 2025-07-08 | End: 2025-07-09 | Stop reason: HOSPADM

## 2025-07-08 RX ORDER — ONDANSETRON 2 MG/ML
4 INJECTION INTRAMUSCULAR; INTRAVENOUS EVERY 6 HOURS PRN
Status: DISCONTINUED | OUTPATIENT
Start: 2025-07-08 | End: 2025-07-09 | Stop reason: HOSPADM

## 2025-07-08 RX ORDER — OLANZAPINE 5 MG/1
15 TABLET, FILM COATED ORAL NIGHTLY
Status: DISCONTINUED | OUTPATIENT
Start: 2025-07-09 | End: 2025-07-09

## 2025-07-08 RX ORDER — ENOXAPARIN SODIUM 100 MG/ML
40 INJECTION SUBCUTANEOUS EVERY 24 HOURS
Status: DISCONTINUED | OUTPATIENT
Start: 2025-07-08 | End: 2025-07-09 | Stop reason: HOSPADM

## 2025-07-08 RX ORDER — SODIUM CHLORIDE 0.9 % (FLUSH) 0.9 %
5-40 SYRINGE (ML) INJECTION EVERY 12 HOURS SCHEDULED
Status: DISCONTINUED | OUTPATIENT
Start: 2025-07-08 | End: 2025-07-09 | Stop reason: HOSPADM

## 2025-07-08 RX ORDER — ONDANSETRON 4 MG/1
4 TABLET, ORALLY DISINTEGRATING ORAL EVERY 8 HOURS PRN
Status: DISCONTINUED | OUTPATIENT
Start: 2025-07-08 | End: 2025-07-09 | Stop reason: HOSPADM

## 2025-07-08 RX ORDER — ASPIRIN 81 MG/1
81 TABLET, CHEWABLE ORAL DAILY
Status: DISCONTINUED | OUTPATIENT
Start: 2025-07-08 | End: 2025-07-09 | Stop reason: HOSPADM

## 2025-07-08 RX ADMIN — ATORVASTATIN CALCIUM 80 MG: 40 TABLET, FILM COATED ORAL at 22:09

## 2025-07-08 RX ADMIN — ENOXAPARIN SODIUM 40 MG: 100 INJECTION SUBCUTANEOUS at 22:09

## 2025-07-08 RX ADMIN — SODIUM CHLORIDE, PRESERVATIVE FREE 10 ML: 5 INJECTION INTRAVENOUS at 22:09

## 2025-07-08 RX ADMIN — ASPIRIN 81 MG: 81 TABLET, CHEWABLE ORAL at 22:09

## 2025-07-08 ASSESSMENT — PAIN - FUNCTIONAL ASSESSMENT: PAIN_FUNCTIONAL_ASSESSMENT: 0-10

## 2025-07-08 ASSESSMENT — PAIN DESCRIPTION - LOCATION: LOCATION: LEG

## 2025-07-08 ASSESSMENT — PAIN SCALES - GENERAL
PAINLEVEL_OUTOF10: 8
PAINLEVEL_OUTOF10: 0

## 2025-07-08 ASSESSMENT — PAIN DESCRIPTION - ORIENTATION: ORIENTATION: RIGHT

## 2025-07-08 NOTE — ED TRIAGE NOTES
Patient brought by  who advises that patient had a stress test scheduled at noon today and when he got back to the house around 1100 she was talking \"garble\" and he could not understand anything she was saying. Then they arrived at the stress test and found out she had taken 25 mg of zyprexa this morning and normally only has 5 mg in morning and 15 mg at night. Patient has some mild confusion on a normal however  states she is \"wacko\" at this time. Patient's  also advises he was told to bring her to ER because they believe she may have had a stroke. Patient complaining of right leg pain only at this time.

## 2025-07-08 NOTE — PROGRESS NOTES
Left message for ER nurse to complete MRI screening form, so we would be able to scan this pt before they went up to their room.

## 2025-07-08 NOTE — H&P
Appearance CLEAR      Specific Gravity, UA 1.005 1.001 - 1.023      pH, Urine 7.5 5.0 - 9.0      Protein, UA Negative NEG mg/dL    Glucose, Ur Negative NEG mg/dL    Ketones, Urine Negative NEG mg/dL    Bilirubin, Urine Negative NEG      Blood, Urine Negative NEG      Urobilinogen, Urine 0.2 0.2 - 1.0 EU/dL    Nitrite, Urine Negative NEG      Leukocyte Esterase, Urine Negative NEG      Urine Culture if Indicated CULTURE NOT INDICATED BY UA RESULT      WBC, UA 0-4 U4 /hpf    RBC, UA 0-5 U5 /hpf    BACTERIA, URINE Negative NEG /hpf    Epithelial Cells, UA 0-5 U5 /hpf    Hyaline Casts, UA 0-2 /lpf    Casts 0 0 /lpf    Crystals 0 0 /LPF    Mucus, UA 0 0 /lpf   Urine Drug Screen    Collection Time: 07/08/25  5:46 PM   Result Value Ref Range    Phencyclidine, Urine Negative NEG      Benzodiazepines, Urine Negative NEG      Cocaine, Urine Negative NEG      Amphetamine, Urine Negative NEG      Methadone, Urine Negative NEG      THC, TH-Cannabinol, Urine Negative NEG      Opiates, Urine Positive (A) NEG      Barbiturates, Urine Negative NEG      Drug Screen Comment:        Specimen analysis was performed without chain of custody handling. These results should be used for medical purposes only and not for legal or employment purposes. Unconfirmed screening results must not be used for non-medical purposes.       No results for input(s): \"COVID19\" in the last 72 hours.    CT Head W/O Contrast  Result Date: 7/8/2025  Head CT INDICATION: ams TECHNIQUE: Multiple 2D axial images obtained through the brain without intravenous contrast.  Radiation dose reduction techniques were used for this study:  All CT scans performed at this facility use one or all of the following: Automated exposure control, adjustment of the mA and/or kVp according to patient's size, iterative reconstruction. COMPARISON: None FINDINGS: Mild supra and infratentorial atrophy. No intracranial hemorrhage. Mild periventricular low-attenuation changes. Visualized

## 2025-07-08 NOTE — TELEPHONE ENCOUNTER
Called patient's brother after being notified that patient took three pills of Olanzapine last night (her usual dosage) but then took five pills this AM as she was nervous about her stress test today.     Per her brother, he noted speech changes and somnolence. He relayed this proceeded thorugh her Cardiology visit. He called Behavioral Health who also recommended she be seen in the ER for evaluation.     Per her brother, her speech and somnolence has improved.    I recommended she be evaluated in the ER to R/O another neurological cause for her symptoms. He will take her to be evaluated.

## 2025-07-08 NOTE — TELEPHONE ENCOUNTER
The pts brother, Walter Toribio (Jim) called.  He stated the pt is prescribed Olanzapine 5 mg 1 QAM and 3 QPM.  However, the pt told him she took 3 last night and 5 this morning, because she was nervous about a stress test she is having today.  He stated the pt told him she thought she had a stroke.  He admits her speech was off and she was acting a bit off.  I told him the sxs of an Olanzapine Overdose.   Symptoms that may indicate an olanzapine overdose can include:   Central nervous system (CNS) effects: These can range from drowsiness and difficulty speaking clearly to a state of unconsciousness.  Movement disorders: Involuntary muscle movements, restlessness, or agitation may occur.  Anticholinergic effects: These effects can include a dry mouth, blurred vision, difficulty with urination, and a rapid heartbeat.  Low blood pressure.  Difficulty breathing.  Elevated blood sugar levels.     He stated some of this sounds like what she was doing.  I told him he should take the pt to the ED immediately.  He stated she is in having a stress test.  I asked if the pt would be seeing a Dr while there.  He was not sure.  I told him if the pt comes out of the stress test, and she has not seen a provider, or no one mentions her condition, yet she is still acting and speaking out of sorts, he should take her directly to the ED, as she could have overdosed. He verbalized understanding and was agreeable.  I will forward this msg to Dr. Sinha as an FYI

## 2025-07-08 NOTE — ED PROVIDER NOTES
Hyaline Casts, UA 0-2 /lpf    Casts 0 0 /lpf    Crystals 0 0 /LPF    Mucus, UA 0 0 /lpf         CT Head W/O Contrast   Final Result   1. Atrophy.   2. Periventricular low-attenuation changes likely representing chronic   microvascular angiopathic ischemic changes of mild nature.      Electronically signed by Taras Machuca                   No results for input(s): \"COVID19\" in the last 72 hours.     Voice dictation software was used during the making of this note.  This software is not perfect and grammatical and other typographical errors may be present.  This note has not been completely proofread for errors.     Pierre Dowell, DO  07/08/25 [id number]

## 2025-07-09 ENCOUNTER — APPOINTMENT (OUTPATIENT)
Dept: NON INVASIVE DIAGNOSTICS | Age: 80
End: 2025-07-09
Attending: HOSPITALIST
Payer: MEDICARE

## 2025-07-09 VITALS
BODY MASS INDEX: 23.03 KG/M2 | HEIGHT: 61 IN | TEMPERATURE: 98.1 F | WEIGHT: 122 LBS | RESPIRATION RATE: 16 BRPM | DIASTOLIC BLOOD PRESSURE: 84 MMHG | OXYGEN SATURATION: 95 % | SYSTOLIC BLOOD PRESSURE: 164 MMHG | HEART RATE: 88 BPM

## 2025-07-09 LAB
ANION GAP SERPL CALC-SCNC: 13 MMOL/L (ref 7–16)
BUN SERPL-MCNC: 10 MG/DL (ref 8–23)
CALCIUM SERPL-MCNC: 9.4 MG/DL (ref 8.8–10.2)
CHLORIDE SERPL-SCNC: 104 MMOL/L (ref 98–107)
CHOLEST SERPL-MCNC: 166 MG/DL (ref 0–200)
CO2 SERPL-SCNC: 23 MMOL/L (ref 20–29)
CREAT SERPL-MCNC: 0.56 MG/DL (ref 0.6–1.1)
ECHO AO ASC DIAM: 2.9 CM
ECHO AO ASCENDING AORTA INDEX: 1.9 CM/M2
ECHO AO ROOT DIAM: 3 CM
ECHO AO ROOT INDEX: 1.96 CM/M2
ECHO AV AREA PEAK VELOCITY: 2.3 CM2
ECHO AV AREA VTI: 3 CM2
ECHO AV AREA/BSA PEAK VELOCITY: 1.5 CM2/M2
ECHO AV AREA/BSA VTI: 2 CM2/M2
ECHO AV MEAN GRADIENT: 4 MMHG
ECHO AV MEAN VELOCITY: 0.9 M/S
ECHO AV PEAK GRADIENT: 9 MMHG
ECHO AV PEAK VELOCITY: 1.5 M/S
ECHO AV VELOCITY RATIO: 0.73
ECHO AV VTI: 23.4 CM
ECHO BSA: 1.54 M2
ECHO IVC EXP: 1.9 CM
ECHO LA AREA 2C: 17.7 CM2
ECHO LA AREA 4C: 15.9 CM2
ECHO LA DIAMETER INDEX: 1.83 CM/M2
ECHO LA DIAMETER: 2.8 CM
ECHO LA MAJOR AXIS: 5.4 CM
ECHO LA MINOR AXIS: 5 CM
ECHO LA TO AORTIC ROOT RATIO: 0.93
ECHO LA VOL BP: 45 ML (ref 22–52)
ECHO LA VOL MOD A2C: 51 ML (ref 22–52)
ECHO LA VOL MOD A4C: 37 ML (ref 22–52)
ECHO LA VOL/BSA BIPLANE: 29 ML/M2 (ref 16–34)
ECHO LA VOLUME INDEX MOD A2C: 33 ML/M2 (ref 16–34)
ECHO LA VOLUME INDEX MOD A4C: 24 ML/M2 (ref 16–34)
ECHO LV E' LATERAL VELOCITY: 9.48 CM/S
ECHO LV E' SEPTAL VELOCITY: 9.38 CM/S
ECHO LV EDV A2C: 63 ML
ECHO LV EDV A4C: 67 ML
ECHO LV EDV INDEX A4C: 44 ML/M2
ECHO LV EDV NDEX A2C: 41 ML/M2
ECHO LV EF PHYSICIAN: 70 %
ECHO LV EJECTION FRACTION A2C: 64 %
ECHO LV EJECTION FRACTION A4C: 66 %
ECHO LV EJECTION FRACTION BIPLANE: 70 % (ref 55–100)
ECHO LV ESV A2C: 23 ML
ECHO LV ESV A4C: 23 ML
ECHO LV ESV INDEX A2C: 15 ML/M2
ECHO LV ESV INDEX A4C: 15 ML/M2
ECHO LV FRACTIONAL SHORTENING: 32 % (ref 28–44)
ECHO LV INTERNAL DIMENSION DIASTOLE INDEX: 2.22 CM/M2
ECHO LV INTERNAL DIMENSION DIASTOLIC: 3.4 CM (ref 3.9–5.3)
ECHO LV INTERNAL DIMENSION SYSTOLIC INDEX: 1.5 CM/M2
ECHO LV INTERNAL DIMENSION SYSTOLIC: 2.3 CM
ECHO LV IVSD: 1 CM (ref 0.6–0.9)
ECHO LV MASS 2D: 84.9 G (ref 67–162)
ECHO LV MASS INDEX 2D: 55.5 G/M2 (ref 43–95)
ECHO LV POSTERIOR WALL DIASTOLIC: 0.8 CM (ref 0.6–0.9)
ECHO LV RELATIVE WALL THICKNESS RATIO: 0.47
ECHO LVOT AREA: 3.1 CM2
ECHO LVOT AV VTI INDEX: 0.94
ECHO LVOT DIAM: 2 CM
ECHO LVOT MEAN GRADIENT: 3 MMHG
ECHO LVOT PEAK GRADIENT: 5 MMHG
ECHO LVOT PEAK VELOCITY: 1.1 M/S
ECHO LVOT STROKE VOLUME INDEX: 45.2 ML/M2
ECHO LVOT SV: 69.1 ML
ECHO LVOT VTI: 22 CM
ECHO MV A VELOCITY: 1.24 M/S
ECHO MV AREA VTI: 3.2 CM2
ECHO MV E DECELERATION TIME (DT): 134 MS
ECHO MV E VELOCITY: 0.81 M/S
ECHO MV E/A RATIO: 0.65
ECHO MV E/E' LATERAL: 8.54
ECHO MV E/E' RATIO (AVERAGED): 8.59
ECHO MV E/E' SEPTAL: 8.64
ECHO MV LVOT VTI INDEX: 0.98
ECHO MV MAX VELOCITY: 1.2 M/S
ECHO MV MEAN GRADIENT: 3 MMHG
ECHO MV MEAN VELOCITY: 0.8 M/S
ECHO MV PEAK GRADIENT: 6 MMHG
ECHO MV VTI: 21.5 CM
ECHO PV ACCELERATION TIME (AT): 108 MS
ECHO PV MAX VELOCITY: 1 M/S
ECHO PV PEAK GRADIENT: 4 MMHG
ECHO RV BASAL DIMENSION: 3 CM
ECHO RV FREE WALL PEAK S': 13.4 CM/S
ERYTHROCYTE [DISTWIDTH] IN BLOOD BY AUTOMATED COUNT: 11.6 % (ref 11.9–14.6)
EST. AVERAGE GLUCOSE BLD GHB EST-MCNC: 99 MG/DL
GLUCOSE SERPL-MCNC: 86 MG/DL (ref 70–99)
HBA1C MFR BLD: 5.1 % (ref 0–5.6)
HCT VFR BLD AUTO: 36.5 % (ref 35.8–46.3)
HDLC SERPL-MCNC: 46 MG/DL (ref 40–60)
HDLC SERPL: 3.6 (ref 0–5)
HGB BLD-MCNC: 12.9 G/DL (ref 11.7–15.4)
LDLC SERPL CALC-MCNC: 97 MG/DL (ref 0–100)
MAGNESIUM SERPL-MCNC: 1.8 MG/DL (ref 1.8–2.4)
MCH RBC QN AUTO: 31.5 PG (ref 26.1–32.9)
MCHC RBC AUTO-ENTMCNC: 35.3 G/DL (ref 31.4–35)
MCV RBC AUTO: 89.2 FL (ref 82–102)
NRBC # BLD: 0 K/UL (ref 0–0.2)
PLATELET # BLD AUTO: 166 K/UL (ref 150–450)
PMV BLD AUTO: 7.9 FL (ref 9.4–12.3)
POTASSIUM SERPL-SCNC: 3.3 MMOL/L (ref 3.5–5.1)
RBC # BLD AUTO: 4.09 M/UL (ref 4.05–5.2)
SODIUM SERPL-SCNC: 140 MMOL/L (ref 136–145)
TRIGL SERPL-MCNC: 116 MG/DL (ref 0–150)
VLDLC SERPL CALC-MCNC: 23 MG/DL (ref 6–23)
WBC # BLD AUTO: 5.8 K/UL (ref 4.3–11.1)

## 2025-07-09 PROCEDURE — 92610 EVALUATE SWALLOWING FUNCTION: CPT

## 2025-07-09 PROCEDURE — 36415 COLL VENOUS BLD VENIPUNCTURE: CPT

## 2025-07-09 PROCEDURE — 83735 ASSAY OF MAGNESIUM: CPT

## 2025-07-09 PROCEDURE — 6370000000 HC RX 637 (ALT 250 FOR IP): Performed by: INTERNAL MEDICINE

## 2025-07-09 PROCEDURE — 85027 COMPLETE CBC AUTOMATED: CPT

## 2025-07-09 PROCEDURE — 93306 TTE W/DOPPLER COMPLETE: CPT | Performed by: INTERNAL MEDICINE

## 2025-07-09 PROCEDURE — 83036 HEMOGLOBIN GLYCOSYLATED A1C: CPT

## 2025-07-09 PROCEDURE — 80048 BASIC METABOLIC PNL TOTAL CA: CPT

## 2025-07-09 PROCEDURE — 93306 TTE W/DOPPLER COMPLETE: CPT

## 2025-07-09 PROCEDURE — 94760 N-INVAS EAR/PLS OXIMETRY 1: CPT

## 2025-07-09 PROCEDURE — G0378 HOSPITAL OBSERVATION PER HR: HCPCS

## 2025-07-09 PROCEDURE — 2500000003 HC RX 250 WO HCPCS: Performed by: HOSPITALIST

## 2025-07-09 PROCEDURE — 92523 SPEECH SOUND LANG COMPREHEN: CPT

## 2025-07-09 PROCEDURE — 80061 LIPID PANEL: CPT

## 2025-07-09 PROCEDURE — 97161 PT EVAL LOW COMPLEX 20 MIN: CPT

## 2025-07-09 PROCEDURE — 97530 THERAPEUTIC ACTIVITIES: CPT

## 2025-07-09 RX ORDER — POTASSIUM CHLORIDE 1500 MG/1
40 TABLET, EXTENDED RELEASE ORAL ONCE
Status: COMPLETED | OUTPATIENT
Start: 2025-07-09 | End: 2025-07-09

## 2025-07-09 RX ORDER — OLANZAPINE 5 MG/1
5 TABLET, ORALLY DISINTEGRATING ORAL DAILY
Status: DISCONTINUED | OUTPATIENT
Start: 2025-07-10 | End: 2025-07-09 | Stop reason: HOSPADM

## 2025-07-09 RX ORDER — OLANZAPINE 5 MG/1
15 TABLET, ORALLY DISINTEGRATING ORAL NIGHTLY
Status: DISCONTINUED | OUTPATIENT
Start: 2025-07-09 | End: 2025-07-09 | Stop reason: HOSPADM

## 2025-07-09 RX ORDER — OLANZAPINE 2.5 MG/1
5 TABLET, FILM COATED ORAL ONCE
Status: COMPLETED | OUTPATIENT
Start: 2025-07-09 | End: 2025-07-09

## 2025-07-09 RX ORDER — CELECOXIB 100 MG/1
100 CAPSULE ORAL 2 TIMES DAILY
Status: DISCONTINUED | OUTPATIENT
Start: 2025-07-09 | End: 2025-07-09 | Stop reason: HOSPADM

## 2025-07-09 RX ADMIN — SODIUM CHLORIDE, PRESERVATIVE FREE 10 ML: 5 INJECTION INTRAVENOUS at 09:33

## 2025-07-09 RX ADMIN — OLANZAPINE 5 MG: 2.5 TABLET, FILM COATED ORAL at 09:28

## 2025-07-09 RX ADMIN — CELECOXIB 100 MG: 100 CAPSULE ORAL at 11:43

## 2025-07-09 RX ADMIN — POTASSIUM CHLORIDE 40 MEQ: 1500 TABLET, EXTENDED RELEASE ORAL at 10:46

## 2025-07-09 ASSESSMENT — PAIN SCALES - GENERAL
PAINLEVEL_OUTOF10: 4
PAINLEVEL_OUTOF10: 3
PAINLEVEL_OUTOF10: 7

## 2025-07-09 ASSESSMENT — PAIN DESCRIPTION - LOCATION
LOCATION: HIP
LOCATION: HIP
LOCATION: LEG

## 2025-07-09 ASSESSMENT — PAIN DESCRIPTION - ORIENTATION
ORIENTATION: RIGHT

## 2025-07-09 ASSESSMENT — PAIN DESCRIPTION - DESCRIPTORS
DESCRIPTORS: DISCOMFORT
DESCRIPTORS: DISCOMFORT

## 2025-07-09 ASSESSMENT — PAIN DESCRIPTION - PAIN TYPE: TYPE: CHRONIC PAIN

## 2025-07-09 NOTE — DISCHARGE SUMMARY
Electronically signed by Taras Machuca    XR CHEST (2 VW)  Result Date: 6/11/2025  No acute process in the chest. Signed by: 6/11/2025 8:25 AM: Risa Morales MD       Labs: Results:       BMP, Mg, Phos Recent Labs     07/08/25 1642 07/09/25  0654    140   K 3.8 3.3*    104   CO2 23 23   ANIONGAP 12 13   BUN 11 10   CREATININE 0.61 0.56*   LABGLOM >90 >90   CALCIUM 10.0 9.4   GLUCOSE 92 86   MG  --  1.8      CBC Recent Labs     07/08/25 1642 07/09/25  0654   WBC 7.7 5.8   RBC 4.06 4.09   HGB 12.8 12.9   HCT 36.2 36.5   MCV 89.2 89.2   MCH 31.5 31.5   MCHC 35.4* 35.3*   RDW 11.8* 11.6*    166   MPV 7.9* 7.9*   NRBC 0.00 0.00   LYMPHOPCT 19.1  --    MONOPCT 6.5  --    BASOPCT 0.3  --    IMMGRAN 0.3  --    LYMPHSABS 1.47  --    EOSABS 0.06  --    MONOSABS 0.50  --    BASOSABS 0.02  --    ABSIMMGRAN 0.02  --       LFT Recent Labs     07/08/25 1642   BILITOT 0.7   ALKPHOS 121*   AST 21   ALT 16   PROTEIN 6.6   ALBUMIN 3.8   GLOB 2.7      Cardiac  No results found for: \"NTPROBNP\", \"TROPHS\"   Coags No results found for: \"PROTIME\", \"INR\", \"APTT\"   A1c Lab Results   Component Value Date/Time    LABA1C 5.1 05/26/2023 02:13 PM    LABA1C 5.2 10/06/2022 02:53 PM    LABA1C 5.0 07/01/2021 12:01 PM     05/26/2023 02:13 PM     10/06/2022 02:53 PM    EAG 97 07/01/2021 12:01 PM      Lipids Lab Results   Component Value Date/Time    CHOL 227 05/26/2023 02:13 PM    HDL 52 05/26/2023 02:13 PM    CHOLHDLRATIO 4.4 05/26/2023 02:13 PM    TRIG 140 05/26/2023 02:13 PM      Thyroid  Lab Results   Component Value Date/Time    TSHELE 1.70 07/08/2025 04:42 PM        Most Recent UA Lab Results   Component Value Date/Time    COLORU YELLOW/STRAW 07/08/2025 05:46 PM    APPEARANCE CLEAR 07/08/2025 05:46 PM    PROTEINU Negative 07/08/2025 05:46 PM    GLUCOSEU Negative 07/08/2025 05:46 PM    KETUA Negative 07/08/2025 05:46 PM    BILIRUBINUR Negative 07/08/2025 05:46 PM    BLOODU Negative 07/08/2025 05:46 PM

## 2025-07-09 NOTE — PROGRESS NOTES
..Stroke Education provided to patient and the following topics were discussed    1. Patients personal risk factors for stroke are none    2. Warning signs of Stroke:        * Sudden numbness or weakness of the face, arm or leg, especially on one side of          The body            * Sudden confusion, trouble speaking or understanding        * Sudden trouble seeing in one or both eyes        * Sudden trouble walking, dizziness, loss of balance or coordination        * Sudden severe headache with no known cause      3. Importance of activation Emergency Medical Services ( 9-1-1 ) immediately if experience any warning signs of stroke.    4. Be sure and schedule a follow-up appointment with your primary care doctor or any specialists as instructed.     5. You must take medicine every day to treat your risk factors for stroke.  Be sure to take your medicines exactly as your doctor tells you: no more, no less.  Know what your medicines are for , what they do.  Anti-thrombotics /anticoagulants can help prevent strokes.  You are taking the following medicine(s)  aspirin     6.  Smoking and second-hand smoke greatly increase your risk of stroke, cardiovascular disease and death. Smoking history never    7. Information provided was Stroke Handouts    8. Documentation of teaching completed in Patient Education Activity and on Care Plan with teaching response noted?  yes

## 2025-07-09 NOTE — CARE COORDINATION
CASE MANAGEMENT ASSESSMENT NOTE    Patient is a 79 year old female with expressive aphasia, history of bipolar disorder.      Patient assessment completed at bedside.  Patient presents to assessment alert and oriented, and answers questions appropriately.  However, speech is often long and rambling and conversation often steers off topic.  She lives at home by herself.  States she has support from her brother and nephew.  Nephew will stay with her sometimes.  At baseline, she is independent with transfers, and uses a rollator. Lives in a first floor apartment with 3 steps down to enter.  Patient has medicare A and B insurance.  Is established with PCP, KEM Freitas.  Patient is not a current .  States that family helps with obtaining groceries and transportation.  States she also has meals on wheels.    Regarding the possibility her taking a too high of dose of Zyprexa, patient denies thoughts of suicide or self harm.  Patient states that she was receiving home health services with [facility] and she would like to resume these services at discharge.  Referral sent for  SN, PT, OT, MSW.    At this time, care of this patient is ongoing and discharge disposition pending.  PT/OT evals have been ordered and awaiting recommendations.  Case management will continue to follow.  Please notify if there are any changes.       Attending Physician: Ruba Coe, *  Admit Problem: Expressive aphasia [R47.01]  History of bipolar disorder [Z86.59]  Altered mental status, unspecified altered mental status type [R41.82]  Cerebrovascular accident (CVA), unspecified mechanism (HCC) [I63.9]  Speech disturbance, unspecified type [R47.9]  Date/Time of Admission: 7/8/2025  4:28 PM  Problem List:  Patient Active Problem List   Diagnosis    Dyslipidemia    Essential (primary) hypertension    Gastroesophageal reflux disease without esophagitis    Mild cognitive impairment    Sensorineural hearing loss (SNHL)

## 2025-07-09 NOTE — PROGRESS NOTES
SPEECH LANGUAGE PATHOLOGY: COMBINED Dysphagia and Language   Initial Assessment and Discharge    Acknowledge Order  I  Therapy Time  I   Charges     I  Rehab Caseload Tracker    NAME: Meredith Toribio  : 1945  MRN: [id number]    ADMISSION DATE: 2025  PRIMARY DIAGNOSIS: Expressive aphasia    ICD-10: Treatment Diagnosis: R13.11 Dysphagia, Oral Phase  R47.8 Slurred Speech  F80.1 Expressive Language Disorder    RECOMMENDATIONS   Diet:    Regular Consistency  Thin Liquids    Medication: as tolerated   Compensatory Swallowing Strategies:   Upright as possible for all oral intake   Therapeutic Intervention:   Patient/family education  No additional speech therapy intervention indicated at this time.    Patient continues to require skilled intervention:  No. Please re-consult if new concerns arise.      Anticipated Discharge Needs: No additional speech therapy is indicated.      ASSESSMENT    Dysphagia: Patient presents with functional oropharyngeal swallow as able to be determined at bedside characterized by efficient oral prep and transfer, timely swallow initiation, and no overt indications of airway compromise when consuming thin liquids and regular items from breakfast tray.     Language: Patient is very verbose. No slurring or stuttering observed. No anomia or paraphasias present. Patient with baseline of bipolar disorder which appears to be impacting some thought organization, though this presents as baseline. Patient relies on brother to assist with her care and has very specific dietary needs/wants with PO intake, stating she \"may be allergic\" to \"most things\", but unable to provide specifics and appears to be more related to items of interest/disinterest vs true allergy.      Recommend regular solids and thin liquids with medications as tolerated. Patient mentation and speech/language output appear to be at baseline. No further speech therapy indicated.     GENERAL    Subjective: resting in bed with

## 2025-07-09 NOTE — TELEPHONE ENCOUNTER
The pt is currently admitted to the hospital.  I called the pts brother, Walter Toribio (Jim) (on BACILIO).  He stated the pt is in the hospital for testing, but so far everything has come back good. He notes he did notify the hospital of the extra doses of Olanzapine that the pt took.

## 2025-07-09 NOTE — ED NOTES
TRANSFER - OUT REPORT:    Verbal report given to SANDRA Bryan on Meredith Toribio  being transferred to room 352 for routine progression of patient care       Report consisted of patient's Situation, Background, Assessment and   Recommendations(SBAR).     Information from the following report(s) Nurse Handoff Report, ED Encounter Summary, ED SBAR, Adult Overview, MAR, Recent Results, and Neuro Assessment was reviewed with the receiving nurse.    [place] Fall Assessment:    Presents to emergency department  because of falls (Syncope, seizure, or loss of consciousness): No  Age > 70: Yes  Altered Mental Status, Intoxication with alcohol or substance confusion (Disorientation, impaired judgment, poor safety awaremess, or inability to follow instructions): Yes  Impaired Mobility: Ambulates or transfers with assistive devices or assistance; Unable to ambulate or transer.: Yes  Nursing Judgement: Yes          Lines:   Peripheral IV 07/08/25 Distal;Right;Anterior Cephalic (Active)   Site Assessment Clean, dry & intact 07/08/25 1651   Line Status Blood return noted;Normal saline locked;Capped;Flushed;Specimen collected 07/08/25 1651   Phlebitis Assessment No symptoms 07/08/25 1651   Infiltration Assessment 0 07/08/25 1651   Dressing Status New dressing applied;Clean, dry & intact 07/08/25 1651   Dressing Type Transparent 07/08/25 1651   Dressing Intervention New 07/08/25 1651        Opportunity for questions and clarification was provided.      Patient transported with:  Registered Nurse

## 2025-07-09 NOTE — PROGRESS NOTES
Occupational Therapy Note:    Attempted to see patient this AM for occupational therapy evaluation session. Patient supine in bed. She declined oob. She lives alone in a one story apartment 3 steps to enter with one rail on right. She is mod I with adls using her rollator at home. She does not shower, but she sponge bathes mod I at the sink,. She uses a rollator for mobility but does not drive. Her brother and nephew take her to appointments. She gets meals on wheels 3 x week. She reported she is receiving  PT services for her R frozen shoulder. Will follow and re-attempt as schedule permits/patient available. Thank you,    Caity Palma, OT    Rehab Caseload Tracker

## 2025-07-09 NOTE — CARE COORDINATION
Patient with discharge orders for today. Patient will discharge home with [facility]: SN, PT, OT, MSW.  No additional needs made known to CM. Patient has met all treatment goals and milestones for discharge. Family to provide transportation home. CM following until patient is discharged.        07/09/25 1220   Services At/After Discharge   Transition of Care Consult (CM Consult) Home Health   Internal Home Health No   Reason Outside Agency Chosen Patient already serviced by other home care/hospice agency   Services At/After Discharge OT;PT;Nursing services  (MSW)    Resource Information Provided? No   Mode of Transport at Discharge Other (see comment)  (Family)   Confirm Follow Up Transport Family   Condition of Participation: Discharge Planning   The Plan for Transition of Care is related to the following treatment goals: Patient to discharge home with home health and return to baseline function.   The Patient and/or Patient Representative was provided with a Choice of Provider? Patient   The Patient and/Or Patient Representative agree with the Discharge Plan? Yes   Freedom of Choice list was provided with basic dialogue that supports the patient's individualized plan of care/goals, treatment preferences, and shares the quality data associated with the providers?  Yes

## 2025-07-09 NOTE — PROGRESS NOTES
ACUTE PHYSICAL THERAPY GOALS:   (Developed with and agreed upon by patient and/or caregiver.)  ALL GOALS MET 7/9/25:  (1.)Ms. Toribio will transfer from bed to chair and chair to bed with MINIMAL ASSIST using a Rolator.   (2.)Ms. Toribio will ambulate with CONTACT GUARD ASSIST for 100 feet with a Rolator..  3) Pt able to go up & down 3 steps with rail & MINIMAL ASSIST.  ______________________________________________________     PHYSICAL THERAPY Initial Assessment, Discharge, and PM  (Link to Caseload Tracking: PT Visit Days : 1  Acknowledge Orders  Time In/Out  PT Charge Capture  Rehab Caseload Tracker    Meredith Toribio is a 79 y.o. female   PRIMARY DIAGNOSIS: Expressive aphasia  Expressive aphasia [R47.01]  History of bipolar disorder [Z86.59]  Altered mental status, unspecified altered mental status type [R41.82]  Cerebrovascular accident (CVA), unspecified mechanism (HCC) [I63.9]  Speech disturbance, unspecified type [R47.9]       Reason for Referral: Generalized Muscle Weakness (M62.81)  Other lack of cordination (R27.8)  Difficulty in walking, Not elsewhere classified (R26.2)  Observation: Payor: MEDICARE / Plan: MEDICARE PART A AND B / Product Type: *No Product type* /     ASSESSMENT:     REHAB RECOMMENDATIONS:   Recommendation to date pending progress:  Setting:  Home Health Therapy    Equipment:    Pt has Rolator     ASSESSMENT:  Ms. Toribio was admitted with aphasia & stroke rule out. Pt is living with her brother while functioning with a Rolator in a 1 story apartment while having with 3 steps & a rail at the entrance . Pt's brother was present for session & reported that pt's current transfers & gait, that he observed today, were at her baseline function. PT reviewed home safety & advised pt to not get up without assist.This pt had clear speech but she would frequently want to explain things with excessive detail, needing constant redirection to stay on task. HHPT will follow up to ensure a smmoth

## 2025-07-09 NOTE — PROGRESS NOTES
TRANSFER - IN REPORT:    Verbal report received from Mary FLORES on Meredith Toribio  being received from ED(unit) for routine progression of care      Report consisted of patient's Situation, Background, Assessment and   Recommendations(SBAR).     Information from the following report(s) SBAR, ED Summary, MAR, and Recent Results was reviewed with the receiving nurse.    Opportunity for questions and clarification was provided.      Assessment completed upon patient's arrival to unit and care assumed.

## 2025-07-09 NOTE — ACP (ADVANCE CARE PLANNING)
Advance Care Planning   General Advance Care Planning (ACP) Conversation    Date of Conversation: 7/8/2025  Conducted with: Patient with Decision Making Capacity  Other persons present: None    Healthcare Decision Maker:    Primary Decision Maker (Active): Walter Toribio\"Harley\" - Brother/Sister - [phone]    Today we documented Decision Maker(s) consistent with Legal Next of Kin hierarchy.  Content/Action Overview:  DECLINED ACP Conversation - will revisit periodically    Length of Voluntary ACP Conversation in minutes:  <16 minutes (Non-Billable)    Christiano Reyes RN

## 2025-07-10 ENCOUNTER — TELEPHONE (OUTPATIENT)
Dept: FAMILY MEDICINE CLINIC | Facility: CLINIC | Age: 80
End: 2025-07-10

## 2025-07-10 ENCOUNTER — CARE COORDINATION (OUTPATIENT)
Dept: CARE COORDINATION | Facility: CLINIC | Age: 80
End: 2025-07-10

## 2025-07-10 ENCOUNTER — TELEPHONE (OUTPATIENT)
Dept: BEHAVIORAL/MENTAL HEALTH CLINIC | Age: 80
End: 2025-07-10

## 2025-07-10 DIAGNOSIS — R47.01 EXPRESSIVE APHASIA: Primary | ICD-10-CM

## 2025-07-10 PROCEDURE — 1111F DSCHRG MED/CURRENT MED MERGE: CPT

## 2025-07-10 NOTE — TELEPHONE ENCOUNTER
I also spoke with Ms. oTribio yesterday. It was difficult to understand what she was calling about. She was speaking into the phone and also to, we think, her brother who was present with her during the phone call. I again explained that Dr. Sinha was out of the office but would be back next week.  She was strongly encouraged to keep her f/u with Dr. Sinha as scheduled for 7/17/25 @ 11AM.  She stated she needed an early in the day appointment and confirmed that 11am would work for her.      She continued to speak without focus. I reconfirmed her 7/17 appointment and ended the call.

## 2025-07-10 NOTE — CARE COORDINATION
Care Transitions Note    Initial Call - Call within 2 business days of discharge: Yes    Patient Current Location:  Home:  [address]  [facility] [address]    Care Transition Nurse contacted the Active HC decision maker, Walter Toribio by telephone to perform post hospital discharge assessment, verified name and  as identifiers.  Provided introduction to self, and explanation of the Care Transition Nurse role.    Patient: Meredith Toribio    Patient : 1945   MRN: [id number]    Reason for Admission: Expressive aphasia  Discharge Date: 25  RURS: No data recorded    Last Discharge Facility       Date Complaint Diagnosis Description Type Department Provider    25 Drug Overdose; Altered Mental Status Speech disturbance, unspecified type ... ED to Hosp-Admission (Discharged) (ADMITTED) Ruba Santiago DO; Chiara...            Was this an external facility discharge? No    Additional needs identified to be addressed with provider   No needs identified             Method of communication with provider: none.    Patients top risk factors for readmission: medical condition-expressive  aphasia,Bi-polar,HTN,COPD    Interventions to address risk factors:   Home Health: CTN confirmed with Dinah that referral received to add Nursing, MSW, and OT to current HH orders. Patient initial start of care began on 2025 with just PTCamilla Nix confirmed orders received and have been sent to the Care Team. The office will call patient's brother to schedule visit.   Reviewed discharge instructions and offered opportunity to ask any questions regarding discharge instructions.  Confirmed medications obtained and taking as ordered  Reviewed upcoming follow up appointments:   Behavioral Health-2025 at 11:00 am  CTN scheduled PCP HFU appointment for 2025 at 2:30 pm  [facility] right hip surgery scheduled for 2025 at 9:52 am  Patient/Family to contact CTN with any issues, questions, or concerns prior to

## 2025-07-10 NOTE — TELEPHONE ENCOUNTER
The pt called yesterday.  It was difficult to understand exactly what the pt was truly calling about, as her conversation was not centered.  She talked about her Orthopedist, having surgery, some kind of order for 25 mg (not sure of what), and that she was in the hospital.  When I tried to get her to focus on what she was calling Dr. Sinha for she tried to explain why she ended up in the hospital.  She kept saying, \"Dr. Sinha told me if my brother was making my nerves back to take an extra of the Olanzapine.\"  I tried to explain to her that IF Dr. Sinha told her that she did not intend for the pt to take 5 at a time. She went back and forth about whether she did or didn't take 5 Olanzapine.  She could not get her thoughts together, and kept saying, \"So, let me think about it for a second. So, this is what happened.....\" Each time she made that statement her story varied or changed. I finally asked her what it was she wanted Dr. Sinha to do for her.  She stated she had an appt on 7/17/25 that she wanted to cnx, because she was having surgery on 7/18/25.  I reiterated to her that she needed to keep this appt, as she needed to sit down with Dr. Sinha and clarify exactly what she is supposed to be doing with her medications. Each time I would try to redirect the conversation back to keeping her appt she would begin talking about orders, surgery, and her Ortho Provider.   Eventually, Shelli Dorsey () took the call.   I will forward the msg to Shelli to document her conversation with the pt.

## 2025-07-10 NOTE — TELEPHONE ENCOUNTER
Care Transitions Initial Follow Up Call    Outreach made within 2 business days of discharge: Yes    Patient: Meredith Toribio Patient : 1945   MRN: [id number]  Reason for Admission: Expressive aphasia   Discharge Date: 25       Spoke with: Meredith Toribio     Discharge department/facility: [facility]     TCM Interactive Patient Contact:  Was patient able to fill all prescriptions: Yes  Was patient instructed to bring all medications to the follow-up visit: Yes  Is patient taking all medications as directed in the discharge summary? Yes  Does patient understand their discharge instructions: Yes  Does patient have questions or concerns that need addressed prior to 7-14 day follow up office visit: no    Additional needs identified to be addressed with provider  No needs identified             Scheduled appointment with PCP within 7-14 days    Follow Up  Future Appointments   Date Time Provider Department Center   2025  2:30 PM Sandee Haynes APRN - CNP [facility]   2025 11:00 AM Mary Sinha MD [id number]   10/9/2025  1:15 PM [facility] LAB [facility]   10/16/2025  1:30 PM Sandee Haynes APRN - CNP [facility]       TOPHER ADAME MA

## 2025-07-14 ENCOUNTER — TELEPHONE (OUTPATIENT)
Dept: BEHAVIORAL/MENTAL HEALTH CLINIC | Age: 80
End: 2025-07-14

## 2025-07-14 ENCOUNTER — CARE COORDINATION (OUTPATIENT)
Dept: CARE COORDINATION | Facility: CLINIC | Age: 80
End: 2025-07-14

## 2025-07-14 NOTE — CARE COORDINATION
Spoke with patient's brother to confirm upcoming appointment with PCP 7/16/25 at 2:30 as well as f/u with Dr. Sinha on 7/17/25.  No questions voiced at time of call.

## 2025-07-14 NOTE — TELEPHONE ENCOUNTER
Patient stated that she overtook her Olanzapine because \"I was told I could take an extra one if I don't feel good\" and then forgot she had already taken and extra one so she took another one. Stated that she was having trouble speaking and memory difficulties. She went to [facility]. There is an ER note on 7/8 regarding this incident.  Patient is scheduled for an appointment here in office on 7/17.

## 2025-07-16 ENCOUNTER — OFFICE VISIT (OUTPATIENT)
Dept: FAMILY MEDICINE CLINIC | Facility: CLINIC | Age: 80
End: 2025-07-16

## 2025-07-16 VITALS
WEIGHT: 114 LBS | BODY MASS INDEX: 21.52 KG/M2 | DIASTOLIC BLOOD PRESSURE: 70 MMHG | OXYGEN SATURATION: 98 % | HEART RATE: 100 BPM | TEMPERATURE: 98 F | SYSTOLIC BLOOD PRESSURE: 118 MMHG | HEIGHT: 61 IN

## 2025-07-16 DIAGNOSIS — R44.1 HALLUCINATIONS, VISUAL: ICD-10-CM

## 2025-07-16 DIAGNOSIS — E87.6 HYPOKALEMIA: ICD-10-CM

## 2025-07-16 DIAGNOSIS — Z09 HOSPITAL DISCHARGE FOLLOW-UP: Primary | ICD-10-CM

## 2025-07-16 PROBLEM — R47.01 EXPRESSIVE APHASIA: Status: RESOLVED | Noted: 2025-07-08 | Resolved: 2025-07-16

## 2025-07-16 LAB — POTASSIUM SERPL-SCNC: 3.7 MMOL/L (ref 3.5–5.1)

## 2025-07-16 ASSESSMENT — ENCOUNTER SYMPTOMS
COUGH: 0
SHORTNESS OF BREATH: 0
CHEST TIGHTNESS: 0

## 2025-07-16 NOTE — PROGRESS NOTES
Chief Complaint   Patient presents with    Follow-Up from Hospital    Hip Pain     Gets worn out walking with walker right extreme pain    Back Pain       Meredith Toribio   is a 79 y.o.. female  who presents in the office today for transitional care visit after admission to [facility], was discharged on 7/9/25. Patient was admitted for expressive aphasia; altered mental status.      Patient was called by our office within two days of hospital discharge to check for any needs and to schedule today's appointment.      She presented to the ER after recommendations from this provider for difficulty speaking and forming words. Per her brother, this had been occurring for two hours. Review of hospitalization revealed that patient took 25 mg of Zyprexa vs her usual dose of 5 mg in the AM.     CT head negative for acute changes.   MRI negative for acute changes.   ECHO with EF 75-80%.     Labs revealed K 3.3, overall other labs were unremarkable.     At today's visit:     We will repeat her K level.     She is overall doing well. She is taking her medications as prescribed. She does endorse new onset hallucinations relaying she saw Ed marion at her from a picture. This has been occurring at least over the last several days. Her brother relays she has experienced hallucinations in the past. She sees her psychiatrist tomorrow. Her MRI was not evident for any acute changes. I advised they mention this to her provider tomorrow.     She is scheduled for her right hip surgery in two days.     Current Outpatient Medications   Medication Sig Dispense Refill    celecoxib (CELEBREX) 100 MG capsule Take 1 capsule by mouth 2 times daily      OLANZapine (ZYPREXA) 5 MG tablet Take 1 tablet by mouth every morning AND 3 tablets at bedtime. Take 5 mg ( one tablet) in the am and 15 mg (3 tablets) at night.. 120 tablet 3    Misc. Devices (ROLLATOR ULTRA-LIGHT) MISC 1 each by Does not apply route once for 1 dose 1 each 0

## 2025-07-17 ENCOUNTER — OFFICE VISIT (OUTPATIENT)
Dept: BEHAVIORAL/MENTAL HEALTH CLINIC | Age: 80
End: 2025-07-17
Payer: MEDICARE

## 2025-07-17 ENCOUNTER — CARE COORDINATION (OUTPATIENT)
Dept: CARE COORDINATION | Facility: CLINIC | Age: 80
End: 2025-07-17

## 2025-07-17 VITALS
HEIGHT: 61 IN | TEMPERATURE: 98.3 F | OXYGEN SATURATION: 98 % | WEIGHT: 115 LBS | SYSTOLIC BLOOD PRESSURE: 136 MMHG | DIASTOLIC BLOOD PRESSURE: 88 MMHG | HEART RATE: 91 BPM | BODY MASS INDEX: 21.71 KG/M2

## 2025-07-17 DIAGNOSIS — F51.05 INSOMNIA DUE TO MENTAL DISORDER: ICD-10-CM

## 2025-07-17 DIAGNOSIS — F43.10 PTSD (POST-TRAUMATIC STRESS DISORDER): ICD-10-CM

## 2025-07-17 DIAGNOSIS — F41.1 GENERALIZED ANXIETY DISORDER: ICD-10-CM

## 2025-07-17 DIAGNOSIS — F31.31 BIPOLAR AFFECTIVE DISORDER, CURRENTLY DEPRESSED, MILD (HCC): Primary | ICD-10-CM

## 2025-07-17 PROCEDURE — 1123F ACP DISCUSS/DSCN MKR DOCD: CPT | Performed by: PSYCHIATRY & NEUROLOGY

## 2025-07-17 PROCEDURE — 1159F MED LIST DOCD IN RCRD: CPT | Performed by: PSYCHIATRY & NEUROLOGY

## 2025-07-17 PROCEDURE — 3075F SYST BP GE 130 - 139MM HG: CPT | Performed by: PSYCHIATRY & NEUROLOGY

## 2025-07-17 PROCEDURE — 3079F DIAST BP 80-89 MM HG: CPT | Performed by: PSYCHIATRY & NEUROLOGY

## 2025-07-17 PROCEDURE — 1090F PRES/ABSN URINE INCON ASSESS: CPT | Performed by: PSYCHIATRY & NEUROLOGY

## 2025-07-17 PROCEDURE — 99214 OFFICE O/P EST MOD 30 MIN: CPT | Performed by: PSYCHIATRY & NEUROLOGY

## 2025-07-17 PROCEDURE — G8427 DOCREV CUR MEDS BY ELIG CLIN: HCPCS | Performed by: PSYCHIATRY & NEUROLOGY

## 2025-07-17 PROCEDURE — G8420 CALC BMI NORM PARAMETERS: HCPCS | Performed by: PSYCHIATRY & NEUROLOGY

## 2025-07-17 PROCEDURE — 1036F TOBACCO NON-USER: CPT | Performed by: PSYCHIATRY & NEUROLOGY

## 2025-07-17 PROCEDURE — G8400 PT W/DXA NO RESULTS DOC: HCPCS | Performed by: PSYCHIATRY & NEUROLOGY

## 2025-07-17 RX ORDER — OLANZAPINE 5 MG/1
TABLET, FILM COATED ORAL
Qty: 120 TABLET | Refills: 3 | Status: SHIPPED | OUTPATIENT
Start: 2025-07-17

## 2025-07-17 RX ORDER — ALBUTEROL SULFATE 90 UG/1
2 INHALANT RESPIRATORY (INHALATION) EVERY 6 HOURS PRN
COMMUNITY

## 2025-07-17 ASSESSMENT — PATIENT HEALTH QUESTIONNAIRE - PHQ9
SUM OF ALL RESPONSES TO PHQ QUESTIONS 1-9: 11
10. IF YOU CHECKED OFF ANY PROBLEMS, HOW DIFFICULT HAVE THESE PROBLEMS MADE IT FOR YOU TO DO YOUR WORK, TAKE CARE OF THINGS AT HOME, OR GET ALONG WITH OTHER PEOPLE: SOMEWHAT DIFFICULT
8. MOVING OR SPEAKING SO SLOWLY THAT OTHER PEOPLE COULD HAVE NOTICED. OR THE OPPOSITE, BEING SO FIGETY OR RESTLESS THAT YOU HAVE BEEN MOVING AROUND A LOT MORE THAN USUAL: MORE THAN HALF THE DAYS
SUM OF ALL RESPONSES TO PHQ QUESTIONS 1-9: 11
7. TROUBLE CONCENTRATING ON THINGS, SUCH AS READING THE NEWSPAPER OR WATCHING TELEVISION: MORE THAN HALF THE DAYS
5. POOR APPETITE OR OVEREATING: MORE THAN HALF THE DAYS
9. THOUGHTS THAT YOU WOULD BE BETTER OFF DEAD, OR OF HURTING YOURSELF: NOT AT ALL
1. LITTLE INTEREST OR PLEASURE IN DOING THINGS: MORE THAN HALF THE DAYS
SUM OF ALL RESPONSES TO PHQ QUESTIONS 1-9: 11
4. FEELING TIRED OR HAVING LITTLE ENERGY: SEVERAL DAYS
2. FEELING DOWN, DEPRESSED OR HOPELESS: SEVERAL DAYS
SUM OF ALL RESPONSES TO PHQ QUESTIONS 1-9: 11
3. TROUBLE FALLING OR STAYING ASLEEP: SEVERAL DAYS
6. FEELING BAD ABOUT YOURSELF - OR THAT YOU ARE A FAILURE OR HAVE LET YOURSELF OR YOUR FAMILY DOWN: NOT AT ALL

## 2025-07-17 NOTE — CARE COORDINATION
Care Transitions Note    Follow Up Call     Patient Current Location:  Home: [address]  [place] [place] [facility] [place] [address]    N Care Coordinator contacted the patient by telephone. Verified name and  as identifiers.    Additional needs identified to be addressed with provider   No needs identified                 Method of communication with provider: none.    Care Summary Note: Spoke briefly with patient who reports doing fine this morning.   Patient attended f/u with PCP on 25.  Patient is taking all medications as prescribed.  She did report having new onset hallucinations relaying she saw Ed marion at her from a picture.  Her brother/caregiver reports patient having hallucinations in the past.  She is aware of her appointment this morning with psychiatry at 11am.  She is on schedule for right posterior PURA on 25 at [facility].  Patient confirms this appointment.  No questions voiced at time of call.        Advance Care Planning:   Does patient have an Advance Directive: reviewed during previous call, see note. .    Medication Review:  No changes since last call.       Assessments:   Goals Addressed                   This Visit's Progress     Returns to baseline activity level.   On track     Patient/Family able to obtain medicine after d/c     Patient/Family able to verbalize medicine changes     Patient/Family aware and attends follow up appointments s/p d/c.     Patient/Family agrees to notify provider of any barriers to plan of care.    Patient/Family agrees to notify provider of any symptoms that indicate a worsening of condition               Follow Up Appointment:   Reviewed upcoming appointment(s).  Future Appointments         Provider Specialty Dept Phone    2025 11:00 AM Mary Sinha MD Behavioral Health [phone]    10/9/2025 1:15 PM [facility] LAB Family Medicine [phone]    10/16/2025 1:30 PM Sandee Haynes APRN - CNP Family Medicine [phone]            LPN Care Coordinator

## 2025-07-17 NOTE — PROGRESS NOTES
tablet Take 1 tablet by mouth daily    diphenhydrAMINE (BENADRYL) 25 MG capsule Take 1 capsule by mouth every 6 hours as needed for Allergies    fexofenadine (ALLEGRA) 180 MG tablet Take 1 tablet by mouth daily    melatonin 3 MG TABS tablet Take 1 tablet by mouth nightly as needed (sleep) Takes 2 tabs    vitamin E 1000 units capsule Take 400 Units by mouth daily    Misc. Devices (ROLLATOR ULTRA-LIGHT) MISC 1 each by Does not apply route once for 1 dose    Coenzyme Q10 (COQ10 PO) Take 1 tablet by mouth 2 times daily (Patient not taking: Reported on 7/17/2025)     No current facility-administered medications for this visit.       Allergies   Allergen Reactions    Latex Itching    Articaine Other (See Comments)    Donepezil Other (See Comments)     Dizzy,weak and vomiting     Epinephrine Swelling     Tongue swelling     Eql Antibacterial Hand Soap [Benzalkonium Chloride]      Dial Soap itching      Erythromycin Other (See Comments)     GI upset    Lamictal [Lamotrigine]     Lexapro [Escitalopram]      Drowsy, nauseated, diarrhea,     Other      Dial antibacterial soap    Penicillins Itching     rash      Procaine     Strawberry Swelling    Aspirin Other (See Comments)     Unknown    Cephalexin Rash    Chlorzoxazone Rash and Other (See Comments)    Clindamycin Nausea And Vomiting    Gabapentin Rash     Rx'd by psychiatry. Tongue swelling    Tetracyclines & Related      Other Reaction(s): Other (comments), Other (see comments)      uncertain    Tramadol Rash       Past Medical History, Past Surgical History, Family history, Social History, and Medications were all reviewed with the patient today and updated as necessary. Where available I have reviewed outside charts in epic and I have referenced care everywhere where possible.     Compliant with medication: Yes   Side effects from medications:  No          9/26/2024     9:00 AM   Mini Mental State Exam MMSE   What is the Year 1   What is the Season 1   What is the Date

## 2025-07-21 ENCOUNTER — CARE COORDINATION (OUTPATIENT)
Dept: CARE COORDINATION | Facility: CLINIC | Age: 80
End: 2025-07-21

## 2025-07-21 NOTE — CARE COORDINATION
Care Transitions Note    Follow Up Call     Attempted to reach patient for transitions of care follow up.  Unable to reach patient.      Outreach Attempts:   Unable to leave message.     Care Summary Note: Unable to reach patient for subsequent MADELINE outreach today.  Voicemail box is full.  Patient is scheduled with PCP on 10/16/25 at 1:30pm with labs scheduled prior on 10/9/25 at 1:15.  Will continue to outreach per protocol.        Follow Up Appointment:   Future Appointments         Provider Specialty Dept Phone    10/9/2025 1:15 PM [facility] [phone]    10/16/2025 1:30 PM Sandee Haynes, APRN - CNP Family Medicine [phone]    10/17/2025 11:40 AM Mary Sinha MD Behavioral Health [phone]            Plan for follow-up call in 6-10 days based on severity of symptoms and risk factors. Plan for next call: symptom management    Phyllis Felipe LPN

## 2025-07-28 ENCOUNTER — CARE COORDINATION (OUTPATIENT)
Dept: CARE COORDINATION | Facility: CLINIC | Age: 80
End: 2025-07-28

## 2025-07-28 NOTE — CARE COORDINATION
Care Transitions Note    Follow Up Call     Attempted to reach patient for transitions of care follow up.  Unable to reach patient.      Outreach Attempts:   Unable to leave message.       Follow Up Appointment:   Future Appointments         Provider Specialty Dept Phone    10/9/2025 1:15 PM [facility] [phone]    10/16/2025 1:30 PM Sandee Haynes APRN - CNP Family Medicine [phone]    10/17/2025 11:40 AM Mary Sinha MD Behavioral Health [phone]            Plan for follow-up call in 6-10 days based on severity of symptoms and risk factors. Plan for next call: symptom management    Phyllis Felipe LPN

## 2025-08-04 ENCOUNTER — CARE COORDINATION (OUTPATIENT)
Dept: CARE COORDINATION | Facility: CLINIC | Age: 80
End: 2025-08-04

## 2025-08-08 ENCOUNTER — CARE COORDINATION (OUTPATIENT)
Dept: CARE COORDINATION | Facility: CLINIC | Age: 80
End: 2025-08-08